# Patient Record
Sex: FEMALE | Race: WHITE | NOT HISPANIC OR LATINO | Employment: STUDENT | URBAN - METROPOLITAN AREA
[De-identification: names, ages, dates, MRNs, and addresses within clinical notes are randomized per-mention and may not be internally consistent; named-entity substitution may affect disease eponyms.]

---

## 2020-03-24 ENCOUNTER — TELEPHONE (OUTPATIENT)
Dept: ENDOCRINOLOGY | Facility: CLINIC | Age: 25
End: 2020-03-24

## 2020-03-24 NOTE — TELEPHONE ENCOUNTER
Left message per Dr Spicer,    Left call back number for patient to call if she would like to establish care with one of our Endocrine providers

## 2020-11-02 ENCOUNTER — TELEPHONE (OUTPATIENT)
Dept: PEDIATRICS | Facility: CLINIC | Age: 25
End: 2020-11-02

## 2020-11-02 ENCOUNTER — PATIENT MESSAGE (OUTPATIENT)
Dept: PEDIATRICS | Facility: CLINIC | Age: 25
End: 2020-11-02

## 2020-11-02 NOTE — TELEPHONE ENCOUNTER
----- Message from Ele Brush sent at 11/2/2020  2:10 PM CST -----  Contact: Susu [phone]  Patient needs call back. She seen Dr Guerrero up to age 16 and is looking for a shot record

## 2021-01-20 ENCOUNTER — TELEPHONE (OUTPATIENT)
Dept: OBSTETRICS AND GYNECOLOGY | Facility: CLINIC | Age: 26
End: 2021-01-20

## 2021-01-25 ENCOUNTER — TELEPHONE (OUTPATIENT)
Dept: OBSTETRICS AND GYNECOLOGY | Facility: CLINIC | Age: 26
End: 2021-01-25

## 2021-02-11 DIAGNOSIS — Z71.9 HEALTH EDUCATION/COUNSELING: Primary | ICD-10-CM

## 2021-03-10 ENCOUNTER — TELEPHONE (OUTPATIENT)
Dept: OBSTETRICS AND GYNECOLOGY | Facility: CLINIC | Age: 26
End: 2021-03-10

## 2021-03-16 ENCOUNTER — TELEPHONE (OUTPATIENT)
Dept: OBSTETRICS AND GYNECOLOGY | Facility: CLINIC | Age: 26
End: 2021-03-16

## 2021-04-22 ENCOUNTER — PATIENT MESSAGE (OUTPATIENT)
Dept: UROLOGY | Facility: CLINIC | Age: 26
End: 2021-04-22

## 2021-05-05 ENCOUNTER — OFFICE VISIT (OUTPATIENT)
Dept: OBSTETRICS AND GYNECOLOGY | Facility: CLINIC | Age: 26
End: 2021-05-05
Attending: OBSTETRICS & GYNECOLOGY
Payer: COMMERCIAL

## 2021-05-05 VITALS
SYSTOLIC BLOOD PRESSURE: 118 MMHG | HEIGHT: 70 IN | DIASTOLIC BLOOD PRESSURE: 60 MMHG | BODY MASS INDEX: 23.01 KG/M2 | WEIGHT: 160.69 LBS

## 2021-05-05 DIAGNOSIS — Z01.419 ENCOUNTER FOR GYNECOLOGICAL EXAMINATION WITHOUT ABNORMAL FINDING: ICD-10-CM

## 2021-05-05 DIAGNOSIS — Z12.4 SCREENING FOR MALIGNANT NEOPLASM OF THE CERVIX: Primary | ICD-10-CM

## 2021-05-05 PROCEDURE — 1126F AMNT PAIN NOTED NONE PRSNT: CPT | Mod: S$GLB,,, | Performed by: OBSTETRICS & GYNECOLOGY

## 2021-05-05 PROCEDURE — 99385 PREV VISIT NEW AGE 18-39: CPT | Mod: S$GLB,,, | Performed by: OBSTETRICS & GYNECOLOGY

## 2021-05-05 PROCEDURE — 3008F BODY MASS INDEX DOCD: CPT | Mod: CPTII,S$GLB,, | Performed by: OBSTETRICS & GYNECOLOGY

## 2021-05-05 PROCEDURE — 99385 PR PREVENTIVE VISIT,NEW,18-39: ICD-10-PCS | Mod: S$GLB,,, | Performed by: OBSTETRICS & GYNECOLOGY

## 2021-05-05 PROCEDURE — 1126F PR PAIN SEVERITY QUANTIFIED, NO PAIN PRESENT: ICD-10-PCS | Mod: S$GLB,,, | Performed by: OBSTETRICS & GYNECOLOGY

## 2021-05-05 PROCEDURE — 3008F PR BODY MASS INDEX (BMI) DOCUMENTED: ICD-10-PCS | Mod: CPTII,S$GLB,, | Performed by: OBSTETRICS & GYNECOLOGY

## 2021-05-05 PROCEDURE — 88175 CYTOPATH C/V AUTO FLUID REDO: CPT | Performed by: OBSTETRICS & GYNECOLOGY

## 2021-05-05 RX ORDER — SPIRONOLACTONE 100 MG/1
100 TABLET, FILM COATED ORAL DAILY
COMMUNITY
Start: 2021-01-31 | End: 2021-11-29

## 2021-05-14 ENCOUNTER — TELEPHONE (OUTPATIENT)
Dept: OBSTETRICS AND GYNECOLOGY | Facility: CLINIC | Age: 26
End: 2021-05-14

## 2021-05-14 LAB
FINAL PATHOLOGIC DIAGNOSIS: NORMAL
Lab: NORMAL

## 2021-07-05 ENCOUNTER — TELEPHONE (OUTPATIENT)
Dept: INTERNAL MEDICINE | Facility: CLINIC | Age: 26
End: 2021-07-05

## 2021-07-06 ENCOUNTER — OFFICE VISIT (OUTPATIENT)
Dept: INTERNAL MEDICINE | Facility: CLINIC | Age: 26
End: 2021-07-06
Payer: COMMERCIAL

## 2021-07-06 ENCOUNTER — PATIENT MESSAGE (OUTPATIENT)
Dept: INTERNAL MEDICINE | Facility: CLINIC | Age: 26
End: 2021-07-06

## 2021-07-06 ENCOUNTER — HOSPITAL ENCOUNTER (OUTPATIENT)
Dept: RADIOLOGY | Facility: HOSPITAL | Age: 26
Discharge: HOME | End: 2021-07-06
Attending: INTERNAL MEDICINE
Payer: COMMERCIAL

## 2021-07-06 VITALS
BODY MASS INDEX: 23.27 KG/M2 | HEIGHT: 70 IN | HEART RATE: 67 BPM | SYSTOLIC BLOOD PRESSURE: 114 MMHG | TEMPERATURE: 99 F | DIASTOLIC BLOOD PRESSURE: 73 MMHG | WEIGHT: 162.56 LBS

## 2021-07-06 DIAGNOSIS — J02.9 PHARYNGITIS, UNSPECIFIED ETIOLOGY: ICD-10-CM

## 2021-07-06 DIAGNOSIS — R61 NIGHT SWEATS: ICD-10-CM

## 2021-07-06 DIAGNOSIS — R59.0 LEFT CERVICAL LYMPHADENOPATHY: ICD-10-CM

## 2021-07-06 DIAGNOSIS — R59.0 LEFT CERVICAL LYMPHADENOPATHY: Primary | ICD-10-CM

## 2021-07-06 DIAGNOSIS — E10.9 TYPE 1 DIABETES MELLITUS WITHOUT COMPLICATION: ICD-10-CM

## 2021-07-06 LAB
ALBUMIN SERPL BCP-MCNC: 4 G/DL (ref 3.5–5.2)
ALP SERPL-CCNC: 77 U/L (ref 55–135)
ALT SERPL W/O P-5'-P-CCNC: 69 U/L (ref 10–44)
ANION GAP SERPL CALC-SCNC: 9 MMOL/L (ref 8–16)
AST SERPL-CCNC: 76 U/L (ref 10–40)
BASOPHILS # BLD AUTO: 0.1 K/UL (ref 0–0.2)
BASOPHILS NFR BLD: 2.7 % (ref 0–1.9)
BILIRUB SERPL-MCNC: 0.5 MG/DL (ref 0.1–1)
BUN SERPL-MCNC: 6 MG/DL (ref 6–20)
CALCIUM SERPL-MCNC: 10.1 MG/DL (ref 8.7–10.5)
CHLORIDE SERPL-SCNC: 103 MMOL/L (ref 95–110)
CO2 SERPL-SCNC: 28 MMOL/L (ref 23–29)
CREAT SERPL-MCNC: 0.9 MG/DL (ref 0.5–1.4)
DIFFERENTIAL METHOD: ABNORMAL
EOSINOPHIL # BLD AUTO: 0 K/UL (ref 0–0.5)
EOSINOPHIL NFR BLD: 0.5 % (ref 0–8)
ERYTHROCYTE [DISTWIDTH] IN BLOOD BY AUTOMATED COUNT: 12.9 % (ref 11.5–14.5)
EST. GFR  (AFRICAN AMERICAN): >60 ML/MIN/1.73 M^2
EST. GFR  (NON AFRICAN AMERICAN): >60 ML/MIN/1.73 M^2
GLUCOSE SERPL-MCNC: 182 MG/DL (ref 70–110)
GROUP A STREP, MOLECULAR: NEGATIVE
HCT VFR BLD AUTO: 42 % (ref 37–48.5)
HETEROPH AB SERPL QL IA: POSITIVE
HGB BLD-MCNC: 14.5 G/DL (ref 12–16)
IMM GRANULOCYTES # BLD AUTO: 0.01 K/UL (ref 0–0.04)
IMM GRANULOCYTES NFR BLD AUTO: 0.3 % (ref 0–0.5)
LYMPHOCYTES # BLD AUTO: 2.5 K/UL (ref 1–4.8)
LYMPHOCYTES NFR BLD: 67.5 % (ref 18–48)
MCH RBC QN AUTO: 33 PG (ref 27–31)
MCHC RBC AUTO-ENTMCNC: 34.5 G/DL (ref 32–36)
MCV RBC AUTO: 96 FL (ref 82–98)
MONOCYTES # BLD AUTO: 0.5 K/UL (ref 0.3–1)
MONOCYTES NFR BLD: 12.3 % (ref 4–15)
NEUTROPHILS # BLD AUTO: 0.6 K/UL (ref 1.8–7.7)
NEUTROPHILS NFR BLD: 16.7 % (ref 38–73)
NRBC BLD-RTO: 0 /100 WBC
PLATELET # BLD AUTO: 135 K/UL (ref 150–450)
PMV BLD AUTO: 10 FL (ref 9.2–12.9)
POTASSIUM SERPL-SCNC: 5.1 MMOL/L (ref 3.5–5.1)
PROT SERPL-MCNC: 7.1 G/DL (ref 6–8.4)
RBC # BLD AUTO: 4.4 M/UL (ref 4–5.4)
SODIUM SERPL-SCNC: 140 MMOL/L (ref 136–145)
WBC # BLD AUTO: 3.75 K/UL (ref 3.9–12.7)

## 2021-07-06 PROCEDURE — 80053 COMPREHEN METABOLIC PANEL: CPT | Performed by: INTERNAL MEDICINE

## 2021-07-06 PROCEDURE — 36415 COLL VENOUS BLD VENIPUNCTURE: CPT | Performed by: INTERNAL MEDICINE

## 2021-07-06 PROCEDURE — 87651 STREP A DNA AMP PROBE: CPT | Performed by: INTERNAL MEDICINE

## 2021-07-06 PROCEDURE — 86308 HETEROPHILE ANTIBODY SCREEN: CPT | Performed by: INTERNAL MEDICINE

## 2021-07-06 PROCEDURE — 99999 PR PBB SHADOW E&M-EST. PATIENT-LVL III: ICD-10-PCS | Mod: PBBFAC,,, | Performed by: INTERNAL MEDICINE

## 2021-07-06 PROCEDURE — 85025 COMPLETE CBC W/AUTO DIFF WBC: CPT | Performed by: INTERNAL MEDICINE

## 2021-07-06 PROCEDURE — 71046 X-RAY EXAM CHEST 2 VIEWS: CPT | Mod: TC,FY

## 2021-07-06 PROCEDURE — 71046 XR CHEST PA AND LATERAL: ICD-10-PCS | Mod: 26,,, | Performed by: RADIOLOGY

## 2021-07-06 PROCEDURE — 99999 PR PBB SHADOW E&M-EST. PATIENT-LVL III: CPT | Mod: PBBFAC,,, | Performed by: INTERNAL MEDICINE

## 2021-07-06 PROCEDURE — 3008F BODY MASS INDEX DOCD: CPT | Mod: CPTII,S$GLB,, | Performed by: INTERNAL MEDICINE

## 2021-07-06 PROCEDURE — 3008F PR BODY MASS INDEX (BMI) DOCUMENTED: ICD-10-PCS | Mod: CPTII,S$GLB,, | Performed by: INTERNAL MEDICINE

## 2021-07-06 PROCEDURE — 1126F AMNT PAIN NOTED NONE PRSNT: CPT | Mod: S$GLB,,, | Performed by: INTERNAL MEDICINE

## 2021-07-06 PROCEDURE — 71046 X-RAY EXAM CHEST 2 VIEWS: CPT | Mod: 26,,, | Performed by: RADIOLOGY

## 2021-07-06 PROCEDURE — 99204 PR OFFICE/OUTPT VISIT, NEW, LEVL IV, 45-59 MIN: ICD-10-PCS | Mod: S$GLB,,, | Performed by: INTERNAL MEDICINE

## 2021-07-06 PROCEDURE — 1126F PR PAIN SEVERITY QUANTIFIED, NO PAIN PRESENT: ICD-10-PCS | Mod: S$GLB,,, | Performed by: INTERNAL MEDICINE

## 2021-07-06 PROCEDURE — 99204 OFFICE O/P NEW MOD 45 MIN: CPT | Mod: S$GLB,,, | Performed by: INTERNAL MEDICINE

## 2021-07-06 RX ORDER — AMOXICILLIN AND CLAVULANATE POTASSIUM 500; 125 MG/1; MG/1
1 TABLET, FILM COATED ORAL 2 TIMES DAILY
Qty: 14 TABLET | Refills: 0 | Status: SHIPPED | OUTPATIENT
Start: 2021-07-06 | End: 2021-07-19

## 2021-07-06 RX ORDER — FLUTICASONE PROPIONATE 50 MCG
1 SPRAY, SUSPENSION (ML) NASAL 2 TIMES DAILY
Qty: 16 G | Refills: 1 | Status: SHIPPED | OUTPATIENT
Start: 2021-07-06 | End: 2021-07-28

## 2021-07-06 RX ORDER — GUAIFENESIN AND DEXTROMETHORPHAN HYDROBROMIDE 600; 30 MG/1; MG/1
1 TABLET, EXTENDED RELEASE ORAL 2 TIMES DAILY
Qty: 20 TABLET | Refills: 0 | Status: SHIPPED | OUTPATIENT
Start: 2021-07-06 | End: 2021-07-16

## 2021-07-06 RX ORDER — ATORVASTATIN CALCIUM 10 MG/1
10 TABLET, FILM COATED ORAL DAILY
COMMUNITY
Start: 2021-06-19 | End: 2023-01-09 | Stop reason: SDUPTHER

## 2021-07-08 ENCOUNTER — TELEPHONE (OUTPATIENT)
Dept: INTERNAL MEDICINE | Facility: CLINIC | Age: 26
End: 2021-07-08

## 2021-07-12 ENCOUNTER — LAB VISIT (OUTPATIENT)
Dept: LAB | Facility: HOSPITAL | Age: 26
End: 2021-07-12
Attending: INTERNAL MEDICINE
Payer: COMMERCIAL

## 2021-07-12 ENCOUNTER — OFFICE VISIT (OUTPATIENT)
Dept: INTERNAL MEDICINE | Facility: CLINIC | Age: 26
End: 2021-07-12
Payer: COMMERCIAL

## 2021-07-12 VITALS
BODY MASS INDEX: 23.87 KG/M2 | WEIGHT: 166.75 LBS | HEART RATE: 62 BPM | DIASTOLIC BLOOD PRESSURE: 83 MMHG | TEMPERATURE: 98 F | HEIGHT: 70 IN | SYSTOLIC BLOOD PRESSURE: 132 MMHG

## 2021-07-12 DIAGNOSIS — R59.0 LEFT CERVICAL LYMPHADENOPATHY: Primary | ICD-10-CM

## 2021-07-12 DIAGNOSIS — R74.8 ELEVATED LIVER ENZYMES: ICD-10-CM

## 2021-07-12 DIAGNOSIS — J02.9 PHARYNGITIS, UNSPECIFIED ETIOLOGY: ICD-10-CM

## 2021-07-12 DIAGNOSIS — R59.0 LEFT CERVICAL LYMPHADENOPATHY: ICD-10-CM

## 2021-07-12 DIAGNOSIS — E10.9 TYPE 1 DIABETES MELLITUS WITHOUT COMPLICATION: ICD-10-CM

## 2021-07-12 DIAGNOSIS — B27.80 OTHER INFECTIOUS MONONUCLEOSIS WITHOUT COMPLICATION: ICD-10-CM

## 2021-07-12 LAB
ALBUMIN SERPL BCP-MCNC: 4.1 G/DL (ref 3.5–5.2)
ALP SERPL-CCNC: 119 U/L (ref 55–135)
ALT SERPL W/O P-5'-P-CCNC: 190 U/L (ref 10–44)
ANION GAP SERPL CALC-SCNC: 9 MMOL/L (ref 8–16)
AST SERPL-CCNC: 139 U/L (ref 10–40)
BASOPHILS # BLD AUTO: 0.12 K/UL (ref 0–0.2)
BASOPHILS NFR BLD: 1.6 % (ref 0–1.9)
BILIRUB SERPL-MCNC: 0.6 MG/DL (ref 0.1–1)
BUN SERPL-MCNC: 7 MG/DL (ref 6–20)
CALCIUM SERPL-MCNC: 9.3 MG/DL (ref 8.7–10.5)
CHLORIDE SERPL-SCNC: 98 MMOL/L (ref 95–110)
CO2 SERPL-SCNC: 24 MMOL/L (ref 23–29)
CREAT SERPL-MCNC: 0.8 MG/DL (ref 0.5–1.4)
DIFFERENTIAL METHOD: ABNORMAL
EOSINOPHIL # BLD AUTO: 0 K/UL (ref 0–0.5)
EOSINOPHIL NFR BLD: 0.4 % (ref 0–8)
ERYTHROCYTE [DISTWIDTH] IN BLOOD BY AUTOMATED COUNT: 13.1 % (ref 11.5–14.5)
EST. GFR  (AFRICAN AMERICAN): >60 ML/MIN/1.73 M^2
EST. GFR  (NON AFRICAN AMERICAN): >60 ML/MIN/1.73 M^2
GLUCOSE SERPL-MCNC: 127 MG/DL (ref 70–110)
HCT VFR BLD AUTO: 38.9 % (ref 37–48.5)
HGB BLD-MCNC: 13.4 G/DL (ref 12–16)
IMM GRANULOCYTES # BLD AUTO: 0.04 K/UL (ref 0–0.04)
IMM GRANULOCYTES NFR BLD AUTO: 0.5 % (ref 0–0.5)
LYMPHOCYTES # BLD AUTO: 4.9 K/UL (ref 1–4.8)
LYMPHOCYTES NFR BLD: 65.3 % (ref 18–48)
MCH RBC QN AUTO: 32.5 PG (ref 27–31)
MCHC RBC AUTO-ENTMCNC: 34.4 G/DL (ref 32–36)
MCV RBC AUTO: 94 FL (ref 82–98)
MONOCYTES # BLD AUTO: 0.5 K/UL (ref 0.3–1)
MONOCYTES NFR BLD: 7.2 % (ref 4–15)
NEUTROPHILS # BLD AUTO: 1.9 K/UL (ref 1.8–7.7)
NEUTROPHILS NFR BLD: 25 % (ref 38–73)
NRBC BLD-RTO: 0 /100 WBC
PLATELET # BLD AUTO: 188 K/UL (ref 150–450)
PLATELET BLD QL SMEAR: ABNORMAL
PMV BLD AUTO: 9.6 FL (ref 9.2–12.9)
POTASSIUM SERPL-SCNC: 4.6 MMOL/L (ref 3.5–5.1)
PROT SERPL-MCNC: 7.5 G/DL (ref 6–8.4)
RBC # BLD AUTO: 4.12 M/UL (ref 4–5.4)
SODIUM SERPL-SCNC: 131 MMOL/L (ref 136–145)
WBC # BLD AUTO: 7.53 K/UL (ref 3.9–12.7)

## 2021-07-12 PROCEDURE — 36415 COLL VENOUS BLD VENIPUNCTURE: CPT | Performed by: INTERNAL MEDICINE

## 2021-07-12 PROCEDURE — 1126F PR PAIN SEVERITY QUANTIFIED, NO PAIN PRESENT: ICD-10-PCS | Mod: S$GLB,,, | Performed by: INTERNAL MEDICINE

## 2021-07-12 PROCEDURE — 1126F AMNT PAIN NOTED NONE PRSNT: CPT | Mod: S$GLB,,, | Performed by: INTERNAL MEDICINE

## 2021-07-12 PROCEDURE — 86645 CMV ANTIBODY IGM: CPT | Performed by: INTERNAL MEDICINE

## 2021-07-12 PROCEDURE — 99214 OFFICE O/P EST MOD 30 MIN: CPT | Mod: S$GLB,,, | Performed by: INTERNAL MEDICINE

## 2021-07-12 PROCEDURE — 3008F PR BODY MASS INDEX (BMI) DOCUMENTED: ICD-10-PCS | Mod: CPTII,S$GLB,, | Performed by: INTERNAL MEDICINE

## 2021-07-12 PROCEDURE — 85025 COMPLETE CBC W/AUTO DIFF WBC: CPT | Performed by: INTERNAL MEDICINE

## 2021-07-12 PROCEDURE — 99214 PR OFFICE/OUTPT VISIT, EST, LEVL IV, 30-39 MIN: ICD-10-PCS | Mod: S$GLB,,, | Performed by: INTERNAL MEDICINE

## 2021-07-12 PROCEDURE — 86663 EPSTEIN-BARR ANTIBODY: CPT | Performed by: INTERNAL MEDICINE

## 2021-07-12 PROCEDURE — 80053 COMPREHEN METABOLIC PANEL: CPT | Performed by: INTERNAL MEDICINE

## 2021-07-12 PROCEDURE — 86644 CMV ANTIBODY: CPT | Performed by: INTERNAL MEDICINE

## 2021-07-12 PROCEDURE — 3008F BODY MASS INDEX DOCD: CPT | Mod: CPTII,S$GLB,, | Performed by: INTERNAL MEDICINE

## 2021-07-12 PROCEDURE — 99999 PR PBB SHADOW E&M-EST. PATIENT-LVL II: CPT | Mod: PBBFAC,,, | Performed by: INTERNAL MEDICINE

## 2021-07-12 PROCEDURE — 99999 PR PBB SHADOW E&M-EST. PATIENT-LVL II: ICD-10-PCS | Mod: PBBFAC,,, | Performed by: INTERNAL MEDICINE

## 2021-07-13 LAB — CMV IGG SERPL QL IA: NORMAL

## 2021-07-15 LAB
CMV IGM SERPL IA-ACNC: <8 AU/ML
EBV EA IGG SER-ACNC: 38 U/ML
EBV NA IGG SER-ACNC: <3 U/ML
EBV VCA IGG SER-ACNC: 32.1 U/ML
EBV VCA IGM SER-ACNC: 107 U/ML

## 2021-07-19 ENCOUNTER — TELEPHONE (OUTPATIENT)
Dept: INTERNAL MEDICINE | Facility: CLINIC | Age: 26
End: 2021-07-19

## 2021-07-19 ENCOUNTER — CLINICAL SUPPORT (OUTPATIENT)
Dept: INTERNAL MEDICINE | Facility: CLINIC | Age: 26
End: 2021-07-19
Payer: COMMERCIAL

## 2021-07-19 ENCOUNTER — OFFICE VISIT (OUTPATIENT)
Dept: INTERNAL MEDICINE | Facility: CLINIC | Age: 26
End: 2021-07-19
Payer: COMMERCIAL

## 2021-07-19 VITALS
SYSTOLIC BLOOD PRESSURE: 120 MMHG | HEART RATE: 87 BPM | WEIGHT: 160.06 LBS | BODY MASS INDEX: 22.97 KG/M2 | DIASTOLIC BLOOD PRESSURE: 79 MMHG | TEMPERATURE: 98 F

## 2021-07-19 DIAGNOSIS — R74.8 ELEVATED LIVER ENZYMES: Primary | ICD-10-CM

## 2021-07-19 DIAGNOSIS — R59.0 CERVICAL LYMPHADENOPATHY: ICD-10-CM

## 2021-07-19 DIAGNOSIS — R59.0 LEFT CERVICAL LYMPHADENOPATHY: ICD-10-CM

## 2021-07-19 DIAGNOSIS — E10.9 TYPE 1 DIABETES MELLITUS WITHOUT COMPLICATION: ICD-10-CM

## 2021-07-19 DIAGNOSIS — B27.00 GAMMAHERPESVIRAL MONONUCLEOSIS WITHOUT COMPLICATION: Primary | ICD-10-CM

## 2021-07-19 DIAGNOSIS — R74.8 ELEVATED LIVER ENZYMES: ICD-10-CM

## 2021-07-19 LAB
ALBUMIN SERPL BCP-MCNC: 4.1 G/DL (ref 3.5–5.2)
ALP SERPL-CCNC: 91 U/L (ref 55–135)
ALT SERPL W/O P-5'-P-CCNC: 115 U/L (ref 10–44)
ANION GAP SERPL CALC-SCNC: 13 MMOL/L (ref 8–16)
AST SERPL-CCNC: 81 U/L (ref 10–40)
BILIRUB SERPL-MCNC: 0.7 MG/DL (ref 0.1–1)
BUN SERPL-MCNC: 9 MG/DL (ref 6–20)
CALCIUM SERPL-MCNC: 9.9 MG/DL (ref 8.7–10.5)
CHLORIDE SERPL-SCNC: 104 MMOL/L (ref 95–110)
CO2 SERPL-SCNC: 22 MMOL/L (ref 23–29)
CREAT SERPL-MCNC: 1 MG/DL (ref 0.5–1.4)
ERYTHROCYTE [DISTWIDTH] IN BLOOD BY AUTOMATED COUNT: 13.1 % (ref 11.5–14.5)
EST. GFR  (AFRICAN AMERICAN): >60 ML/MIN/1.73 M^2
EST. GFR  (NON AFRICAN AMERICAN): >60 ML/MIN/1.73 M^2
GLUCOSE SERPL-MCNC: 222 MG/DL (ref 70–110)
HCT VFR BLD AUTO: 41.7 % (ref 37–48.5)
HGB BLD-MCNC: 14.2 G/DL (ref 12–16)
MCH RBC QN AUTO: 32.6 PG (ref 27–31)
MCHC RBC AUTO-ENTMCNC: 34.1 G/DL (ref 32–36)
MCV RBC AUTO: 96 FL (ref 82–98)
PLATELET # BLD AUTO: 212 K/UL (ref 150–450)
PMV BLD AUTO: 9.2 FL (ref 9.2–12.9)
POTASSIUM SERPL-SCNC: 4.6 MMOL/L (ref 3.5–5.1)
PROT SERPL-MCNC: 7.5 G/DL (ref 6–8.4)
RBC # BLD AUTO: 4.36 M/UL (ref 4–5.4)
SODIUM SERPL-SCNC: 139 MMOL/L (ref 136–145)
WBC # BLD AUTO: 6.52 K/UL (ref 3.9–12.7)

## 2021-07-19 PROCEDURE — 80053 COMPREHEN METABOLIC PANEL: CPT | Performed by: INTERNAL MEDICINE

## 2021-07-19 PROCEDURE — 99999 PR PBB SHADOW E&M-EST. PATIENT-LVL III: CPT | Mod: PBBFAC,,, | Performed by: INTERNAL MEDICINE

## 2021-07-19 PROCEDURE — 1126F PR PAIN SEVERITY QUANTIFIED, NO PAIN PRESENT: ICD-10-PCS | Mod: CPTII,S$GLB,, | Performed by: INTERNAL MEDICINE

## 2021-07-19 PROCEDURE — 36415 COLL VENOUS BLD VENIPUNCTURE: CPT | Performed by: INTERNAL MEDICINE

## 2021-07-19 PROCEDURE — 3008F PR BODY MASS INDEX (BMI) DOCUMENTED: ICD-10-PCS | Mod: CPTII,S$GLB,, | Performed by: INTERNAL MEDICINE

## 2021-07-19 PROCEDURE — 85027 COMPLETE CBC AUTOMATED: CPT | Performed by: INTERNAL MEDICINE

## 2021-07-19 PROCEDURE — 3008F BODY MASS INDEX DOCD: CPT | Mod: CPTII,S$GLB,, | Performed by: INTERNAL MEDICINE

## 2021-07-19 PROCEDURE — 99213 PR OFFICE/OUTPT VISIT, EST, LEVL III, 20-29 MIN: ICD-10-PCS | Mod: S$GLB,,, | Performed by: INTERNAL MEDICINE

## 2021-07-19 PROCEDURE — 1126F AMNT PAIN NOTED NONE PRSNT: CPT | Mod: CPTII,S$GLB,, | Performed by: INTERNAL MEDICINE

## 2021-07-19 PROCEDURE — 99999 PR PBB SHADOW E&M-EST. PATIENT-LVL III: ICD-10-PCS | Mod: PBBFAC,,, | Performed by: INTERNAL MEDICINE

## 2021-07-19 PROCEDURE — 99213 OFFICE O/P EST LOW 20 MIN: CPT | Mod: S$GLB,,, | Performed by: INTERNAL MEDICINE

## 2021-07-25 ENCOUNTER — TELEPHONE (OUTPATIENT)
Dept: INTERNAL MEDICINE | Facility: CLINIC | Age: 26
End: 2021-07-25

## 2021-07-25 DIAGNOSIS — R74.8 ELEVATED LIVER ENZYMES: Primary | ICD-10-CM

## 2021-07-26 ENCOUNTER — CLINICAL SUPPORT (OUTPATIENT)
Dept: INTERNAL MEDICINE | Facility: CLINIC | Age: 26
End: 2021-07-26
Payer: COMMERCIAL

## 2021-07-26 DIAGNOSIS — Z01.84 ENCOUNTER FOR ANTIBODY RESPONSE EXAMINATION: ICD-10-CM

## 2021-07-26 DIAGNOSIS — R74.8 ELEVATED LIVER ENZYMES: ICD-10-CM

## 2021-07-26 LAB
ALBUMIN SERPL BCP-MCNC: 4.2 G/DL (ref 3.5–5.2)
ALP SERPL-CCNC: 88 U/L (ref 55–135)
ALT SERPL W/O P-5'-P-CCNC: 111 U/L (ref 10–44)
ANION GAP SERPL CALC-SCNC: 10 MMOL/L (ref 8–16)
AST SERPL-CCNC: 86 U/L (ref 10–40)
BASOPHILS # BLD AUTO: 0.1 K/UL (ref 0–0.2)
BASOPHILS NFR BLD: 2.3 % (ref 0–1.9)
BILIRUB SERPL-MCNC: 0.8 MG/DL (ref 0.1–1)
BUN SERPL-MCNC: 13 MG/DL (ref 6–20)
CALCIUM SERPL-MCNC: 10.3 MG/DL (ref 8.7–10.5)
CHLORIDE SERPL-SCNC: 108 MMOL/L (ref 95–110)
CO2 SERPL-SCNC: 24 MMOL/L (ref 23–29)
CREAT SERPL-MCNC: 0.9 MG/DL (ref 0.5–1.4)
DIFFERENTIAL METHOD: ABNORMAL
EOSINOPHIL # BLD AUTO: 0.1 K/UL (ref 0–0.5)
EOSINOPHIL NFR BLD: 1.8 % (ref 0–8)
ERYTHROCYTE [DISTWIDTH] IN BLOOD BY AUTOMATED COUNT: 12.9 % (ref 11.5–14.5)
EST. GFR  (AFRICAN AMERICAN): >60 ML/MIN/1.73 M^2
EST. GFR  (NON AFRICAN AMERICAN): >60 ML/MIN/1.73 M^2
GLUCOSE SERPL-MCNC: 98 MG/DL (ref 70–110)
HCT VFR BLD AUTO: 41.7 % (ref 37–48.5)
HGB BLD-MCNC: 14.3 G/DL (ref 12–16)
IMM GRANULOCYTES # BLD AUTO: 0.01 K/UL (ref 0–0.04)
IMM GRANULOCYTES NFR BLD AUTO: 0.2 % (ref 0–0.5)
LYMPHOCYTES # BLD AUTO: 2.4 K/UL (ref 1–4.8)
LYMPHOCYTES NFR BLD: 54.5 % (ref 18–48)
MCH RBC QN AUTO: 32.5 PG (ref 27–31)
MCHC RBC AUTO-ENTMCNC: 34.3 G/DL (ref 32–36)
MCV RBC AUTO: 95 FL (ref 82–98)
MONOCYTES # BLD AUTO: 0.5 K/UL (ref 0.3–1)
MONOCYTES NFR BLD: 11.1 % (ref 4–15)
NEUTROPHILS # BLD AUTO: 1.3 K/UL (ref 1.8–7.7)
NEUTROPHILS NFR BLD: 30.1 % (ref 38–73)
NRBC BLD-RTO: 0 /100 WBC
PLATELET # BLD AUTO: 249 K/UL (ref 150–450)
PMV BLD AUTO: 9.3 FL (ref 9.2–12.9)
POTASSIUM SERPL-SCNC: 4.7 MMOL/L (ref 3.5–5.1)
PROT SERPL-MCNC: 7.8 G/DL (ref 6–8.4)
RBC # BLD AUTO: 4.4 M/UL (ref 4–5.4)
SARS-COV-2 IGG SERPL IA-ACNC: 4480.6 AU/ML
SARS-COV-2 IGG SERPL QL IA: POSITIVE
SODIUM SERPL-SCNC: 142 MMOL/L (ref 136–145)
WBC # BLD AUTO: 4.33 K/UL (ref 3.9–12.7)

## 2021-07-26 PROCEDURE — 80053 COMPREHEN METABOLIC PANEL: CPT | Performed by: INTERNAL MEDICINE

## 2021-07-26 PROCEDURE — 86769 SARS-COV-2 COVID-19 ANTIBODY: CPT | Performed by: INTERNAL MEDICINE

## 2021-07-26 PROCEDURE — 85025 COMPLETE CBC W/AUTO DIFF WBC: CPT | Performed by: INTERNAL MEDICINE

## 2021-07-28 ENCOUNTER — PATIENT MESSAGE (OUTPATIENT)
Dept: OBSTETRICS AND GYNECOLOGY | Facility: CLINIC | Age: 26
End: 2021-07-28

## 2021-08-03 ENCOUNTER — PATIENT MESSAGE (OUTPATIENT)
Dept: ADMINISTRATIVE | Facility: HOSPITAL | Age: 26
End: 2021-08-03

## 2021-08-04 ENCOUNTER — CLINICAL SUPPORT (OUTPATIENT)
Dept: INTERNAL MEDICINE | Facility: CLINIC | Age: 26
End: 2021-08-04
Payer: COMMERCIAL

## 2021-08-04 ENCOUNTER — CLINICAL SUPPORT (OUTPATIENT)
Dept: URGENT CARE | Facility: CLINIC | Age: 26
End: 2021-08-04
Payer: COMMERCIAL

## 2021-08-04 ENCOUNTER — LAB VISIT (OUTPATIENT)
Dept: INTERNAL MEDICINE | Facility: CLINIC | Age: 26
End: 2021-08-04
Payer: COMMERCIAL

## 2021-08-04 DIAGNOSIS — R74.8 ELEVATED LIVER ENZYMES: ICD-10-CM

## 2021-08-04 DIAGNOSIS — Z20.822 ENCOUNTER FOR LABORATORY TESTING FOR COVID-19 VIRUS: ICD-10-CM

## 2021-08-04 DIAGNOSIS — Z11.9 SCREENING EXAMINATION FOR UNSPECIFIED INFECTIOUS DISEASE: Primary | ICD-10-CM

## 2021-08-04 DIAGNOSIS — A92.30 WEST NILE VIRUS INFECTION, UNSPECIFIED: Primary | ICD-10-CM

## 2021-08-04 LAB
ALBUMIN SERPL BCP-MCNC: 4.1 G/DL (ref 3.5–5.2)
ALP SERPL-CCNC: 65 U/L (ref 55–135)
ALT SERPL W/O P-5'-P-CCNC: 52 U/L (ref 10–44)
ANION GAP SERPL CALC-SCNC: 5 MMOL/L (ref 8–16)
AST SERPL-CCNC: 38 U/L (ref 10–40)
BASOPHILS # BLD AUTO: 0.08 K/UL (ref 0–0.2)
BASOPHILS NFR BLD: 1.9 % (ref 0–1.9)
BILIRUB SERPL-MCNC: 0.9 MG/DL (ref 0.1–1)
BUN SERPL-MCNC: 7 MG/DL (ref 6–20)
CALCIUM SERPL-MCNC: 9.9 MG/DL (ref 8.7–10.5)
CHLORIDE SERPL-SCNC: 106 MMOL/L (ref 95–110)
CO2 SERPL-SCNC: 28 MMOL/L (ref 23–29)
CREAT SERPL-MCNC: 0.9 MG/DL (ref 0.5–1.4)
CTP QC/QA: YES
DIFFERENTIAL METHOD: ABNORMAL
EOSINOPHIL # BLD AUTO: 0.2 K/UL (ref 0–0.5)
EOSINOPHIL NFR BLD: 3.5 % (ref 0–8)
ERYTHROCYTE [DISTWIDTH] IN BLOOD BY AUTOMATED COUNT: 13.1 % (ref 11.5–14.5)
EST. GFR  (AFRICAN AMERICAN): >60 ML/MIN/1.73 M^2
EST. GFR  (NON AFRICAN AMERICAN): >60 ML/MIN/1.73 M^2
GLUCOSE SERPL-MCNC: 132 MG/DL (ref 70–110)
HCT VFR BLD AUTO: 40.1 % (ref 37–48.5)
HGB BLD-MCNC: 13.7 G/DL (ref 12–16)
IMM GRANULOCYTES # BLD AUTO: 0.01 K/UL (ref 0–0.04)
IMM GRANULOCYTES NFR BLD AUTO: 0.2 % (ref 0–0.5)
LYMPHOCYTES # BLD AUTO: 1.8 K/UL (ref 1–4.8)
LYMPHOCYTES NFR BLD: 42.2 % (ref 18–48)
MCH RBC QN AUTO: 32.7 PG (ref 27–31)
MCHC RBC AUTO-ENTMCNC: 34.2 G/DL (ref 32–36)
MCV RBC AUTO: 96 FL (ref 82–98)
MONOCYTES # BLD AUTO: 0.4 K/UL (ref 0.3–1)
MONOCYTES NFR BLD: 10.3 % (ref 4–15)
NEUTROPHILS # BLD AUTO: 1.8 K/UL (ref 1.8–7.7)
NEUTROPHILS NFR BLD: 41.9 % (ref 38–73)
NRBC BLD-RTO: 0 /100 WBC
PLATELET # BLD AUTO: 228 K/UL (ref 150–450)
PMV BLD AUTO: 9.5 FL (ref 9.2–12.9)
POTASSIUM SERPL-SCNC: 4.7 MMOL/L (ref 3.5–5.1)
PROT SERPL-MCNC: 7.3 G/DL (ref 6–8.4)
RBC # BLD AUTO: 4.19 M/UL (ref 4–5.4)
SARS-COV-2 RDRP RESP QL NAA+PROBE: NEGATIVE
SODIUM SERPL-SCNC: 139 MMOL/L (ref 136–145)
WBC # BLD AUTO: 4.29 K/UL (ref 3.9–12.7)

## 2021-08-04 PROCEDURE — 85025 COMPLETE CBC W/AUTO DIFF WBC: CPT | Performed by: INTERNAL MEDICINE

## 2021-08-04 PROCEDURE — U0002: ICD-10-PCS | Mod: QW,S$GLB,, | Performed by: NURSE PRACTITIONER

## 2021-08-04 PROCEDURE — 99211 PR OFFICE/OUTPT VISIT, EST, LEVL I: ICD-10-PCS | Mod: S$GLB,CS,, | Performed by: NURSE PRACTITIONER

## 2021-08-04 PROCEDURE — 80053 COMPREHEN METABOLIC PANEL: CPT | Performed by: INTERNAL MEDICINE

## 2021-08-04 PROCEDURE — U0002 COVID-19 LAB TEST NON-CDC: HCPCS | Mod: QW,S$GLB,, | Performed by: NURSE PRACTITIONER

## 2021-08-04 PROCEDURE — 99211 OFF/OP EST MAY X REQ PHY/QHP: CPT | Mod: S$GLB,CS,, | Performed by: NURSE PRACTITIONER

## 2021-10-18 ENCOUNTER — PATIENT MESSAGE (OUTPATIENT)
Dept: ADMINISTRATIVE | Facility: HOSPITAL | Age: 26
End: 2021-10-18
Payer: COMMERCIAL

## 2021-11-28 ENCOUNTER — PATIENT MESSAGE (OUTPATIENT)
Dept: INTERNAL MEDICINE | Facility: CLINIC | Age: 26
End: 2021-11-28
Payer: COMMERCIAL

## 2021-11-29 ENCOUNTER — CLINICAL SUPPORT (OUTPATIENT)
Dept: INTERNAL MEDICINE | Facility: CLINIC | Age: 26
End: 2021-11-29
Payer: COMMERCIAL

## 2021-11-29 ENCOUNTER — OFFICE VISIT (OUTPATIENT)
Dept: INTERNAL MEDICINE | Facility: CLINIC | Age: 26
End: 2021-11-29
Payer: COMMERCIAL

## 2021-11-29 VITALS
SYSTOLIC BLOOD PRESSURE: 109 MMHG | DIASTOLIC BLOOD PRESSURE: 65 MMHG | HEART RATE: 53 BPM | BODY MASS INDEX: 23.15 KG/M2 | WEIGHT: 161.69 LBS | TEMPERATURE: 98 F | HEIGHT: 70 IN

## 2021-11-29 DIAGNOSIS — Z00.00 ROUTINE GENERAL MEDICAL EXAMINATION AT A HEALTH CARE FACILITY: Primary | ICD-10-CM

## 2021-11-29 DIAGNOSIS — E10.9 TYPE 1 DIABETES MELLITUS WITHOUT COMPLICATION: ICD-10-CM

## 2021-11-29 DIAGNOSIS — E78.5 DYSLIPIDEMIA: ICD-10-CM

## 2021-11-29 LAB
ALBUMIN SERPL BCP-MCNC: 4.1 G/DL (ref 3.5–5.2)
ALBUMIN/CREAT UR: NORMAL UG/MG (ref 0–30)
ALBUMIN/CREAT UR: NORMAL UG/MG (ref 0–30)
ALP SERPL-CCNC: 57 U/L (ref 55–135)
ALT SERPL W/O P-5'-P-CCNC: 16 U/L (ref 10–44)
ANION GAP SERPL CALC-SCNC: 11 MMOL/L (ref 8–16)
AST SERPL-CCNC: 22 U/L (ref 10–40)
BILIRUB SERPL-MCNC: 0.9 MG/DL (ref 0.1–1)
BUN SERPL-MCNC: 14 MG/DL (ref 6–20)
CALCIUM SERPL-MCNC: 9.4 MG/DL (ref 8.7–10.5)
CHLORIDE SERPL-SCNC: 105 MMOL/L (ref 95–110)
CHOLEST SERPL-MCNC: 171 MG/DL (ref 120–199)
CHOLEST/HDLC SERPL: 2.9 {RATIO} (ref 2–5)
CO2 SERPL-SCNC: 22 MMOL/L (ref 23–29)
CREAT SERPL-MCNC: 0.9 MG/DL (ref 0.5–1.4)
CREAT UR-MCNC: 85 MG/DL (ref 15–325)
CREAT UR-MCNC: 85 MG/DL (ref 15–325)
ERYTHROCYTE [DISTWIDTH] IN BLOOD BY AUTOMATED COUNT: 12.8 % (ref 11.5–14.5)
EST. GFR  (AFRICAN AMERICAN): >60 ML/MIN/1.73 M^2
EST. GFR  (NON AFRICAN AMERICAN): >60 ML/MIN/1.73 M^2
ESTIMATED AVG GLUCOSE: 151 MG/DL (ref 68–131)
GLUCOSE SERPL-MCNC: 119 MG/DL (ref 70–110)
HBA1C MFR BLD: 6.9 % (ref 4–5.6)
HCT VFR BLD AUTO: 42.3 % (ref 37–48.5)
HCV AB SERPL QL IA: NEGATIVE
HDLC SERPL-MCNC: 58 MG/DL (ref 40–75)
HDLC SERPL: 33.9 % (ref 20–50)
HGB BLD-MCNC: 13.9 G/DL (ref 12–16)
HIV 1+2 AB+HIV1 P24 AG SERPL QL IA: NEGATIVE
LDLC SERPL CALC-MCNC: 99.2 MG/DL (ref 63–159)
MCH RBC QN AUTO: 32.7 PG (ref 27–31)
MCHC RBC AUTO-ENTMCNC: 32.9 G/DL (ref 32–36)
MCV RBC AUTO: 100 FL (ref 82–98)
MICROALBUMIN UR DL<=1MG/L-MCNC: <5 UG/ML
MICROALBUMIN UR DL<=1MG/L-MCNC: <5 UG/ML
NONHDLC SERPL-MCNC: 113 MG/DL
PLATELET # BLD AUTO: 219 K/UL (ref 150–450)
PMV BLD AUTO: 9.2 FL (ref 9.2–12.9)
POTASSIUM SERPL-SCNC: 4.9 MMOL/L (ref 3.5–5.1)
PROT SERPL-MCNC: 6.8 G/DL (ref 6–8.4)
RBC # BLD AUTO: 4.25 M/UL (ref 4–5.4)
SODIUM SERPL-SCNC: 138 MMOL/L (ref 136–145)
TRIGL SERPL-MCNC: 69 MG/DL (ref 30–150)
TSH SERPL DL<=0.005 MIU/L-ACNC: 2.33 UIU/ML (ref 0.4–4)
WBC # BLD AUTO: 4.81 K/UL (ref 3.9–12.7)

## 2021-11-29 PROCEDURE — 84443 ASSAY THYROID STIM HORMONE: CPT | Performed by: INTERNAL MEDICINE

## 2021-11-29 PROCEDURE — 83036 HEMOGLOBIN GLYCOSYLATED A1C: CPT | Performed by: INTERNAL MEDICINE

## 2021-11-29 PROCEDURE — 90471 FLU VACCINE (QUAD) GREATER THAN OR EQUAL TO 3YO PRESERVATIVE FREE IM: ICD-10-PCS | Mod: S$GLB,,, | Performed by: INTERNAL MEDICINE

## 2021-11-29 PROCEDURE — 80053 COMPREHEN METABOLIC PANEL: CPT | Performed by: INTERNAL MEDICINE

## 2021-11-29 PROCEDURE — 90732 PNEUMOCOCCAL POLYSACCHARIDE VACCINE 23-VALENT =>2YO SQ IM: ICD-10-PCS | Mod: S$GLB,,, | Performed by: INTERNAL MEDICINE

## 2021-11-29 PROCEDURE — 90472 PNEUMOCOCCAL POLYSACCHARIDE VACCINE 23-VALENT =>2YO SQ IM: ICD-10-PCS | Mod: S$GLB,,, | Performed by: INTERNAL MEDICINE

## 2021-11-29 PROCEDURE — 90471 IMMUNIZATION ADMIN: CPT | Mod: S$GLB,,, | Performed by: INTERNAL MEDICINE

## 2021-11-29 PROCEDURE — 90686 IIV4 VACC NO PRSV 0.5 ML IM: CPT | Mod: S$GLB,,, | Performed by: INTERNAL MEDICINE

## 2021-11-29 PROCEDURE — 80061 LIPID PANEL: CPT | Performed by: INTERNAL MEDICINE

## 2021-11-29 PROCEDURE — 99999 PR PBB SHADOW E&M-EST. PATIENT-LVL III: ICD-10-PCS | Mod: PBBFAC,,, | Performed by: INTERNAL MEDICINE

## 2021-11-29 PROCEDURE — 85027 COMPLETE CBC AUTOMATED: CPT | Performed by: INTERNAL MEDICINE

## 2021-11-29 PROCEDURE — 87389 HIV-1 AG W/HIV-1&-2 AB AG IA: CPT | Performed by: INTERNAL MEDICINE

## 2021-11-29 PROCEDURE — 86803 HEPATITIS C AB TEST: CPT | Performed by: INTERNAL MEDICINE

## 2021-11-29 PROCEDURE — 90732 PPSV23 VACC 2 YRS+ SUBQ/IM: CPT | Mod: S$GLB,,, | Performed by: INTERNAL MEDICINE

## 2021-11-29 PROCEDURE — 90686 FLU VACCINE (QUAD) GREATER THAN OR EQUAL TO 3YO PRESERVATIVE FREE IM: ICD-10-PCS | Mod: S$GLB,,, | Performed by: INTERNAL MEDICINE

## 2021-11-29 PROCEDURE — 82570 ASSAY OF URINE CREATININE: CPT | Performed by: INTERNAL MEDICINE

## 2021-11-29 PROCEDURE — 99395 PR PREVENTIVE VISIT,EST,18-39: ICD-10-PCS | Mod: 25,S$GLB,, | Performed by: INTERNAL MEDICINE

## 2021-11-29 PROCEDURE — 99999 PR PBB SHADOW E&M-EST. PATIENT-LVL III: CPT | Mod: PBBFAC,,, | Performed by: INTERNAL MEDICINE

## 2021-11-29 PROCEDURE — 90472 IMMUNIZATION ADMIN EACH ADD: CPT | Mod: S$GLB,,, | Performed by: INTERNAL MEDICINE

## 2021-11-29 PROCEDURE — 99395 PREV VISIT EST AGE 18-39: CPT | Mod: 25,S$GLB,, | Performed by: INTERNAL MEDICINE

## 2021-11-29 RX ORDER — INSULIN LISPRO 100 [IU]/ML
INJECTION, SOLUTION INTRAVENOUS; SUBCUTANEOUS
COMMUNITY

## 2021-12-03 RX ORDER — INSULIN ASPART 100 [IU]/ML
INJECTION, SOLUTION INTRAVENOUS; SUBCUTANEOUS
Qty: 10 ML | Refills: 4 | Status: SHIPPED | OUTPATIENT
Start: 2021-12-03 | End: 2021-12-06

## 2021-12-14 ENCOUNTER — IMMUNIZATION (OUTPATIENT)
Dept: INTERNAL MEDICINE | Facility: CLINIC | Age: 26
End: 2021-12-14
Payer: COMMERCIAL

## 2021-12-14 DIAGNOSIS — Z23 NEED FOR VACCINATION: Primary | ICD-10-CM

## 2021-12-14 PROCEDURE — 0064A COVID-19, MRNA, LNP-S, PF, 100 MCG/0.25 ML DOSE VACCINE (MODERNA BOOSTER): CPT | Mod: PBBFAC | Performed by: INTERNAL MEDICINE

## 2022-01-17 ENCOUNTER — PATIENT MESSAGE (OUTPATIENT)
Dept: OBSTETRICS AND GYNECOLOGY | Facility: CLINIC | Age: 27
End: 2022-01-17
Payer: COMMERCIAL

## 2022-02-16 ENCOUNTER — TELEPHONE (OUTPATIENT)
Dept: OBSTETRICS AND GYNECOLOGY | Facility: CLINIC | Age: 27
End: 2022-02-16
Payer: COMMERCIAL

## 2022-04-12 ENCOUNTER — TELEPHONE (OUTPATIENT)
Dept: INTERNAL MEDICINE | Facility: CLINIC | Age: 27
End: 2022-04-12
Payer: COMMERCIAL

## 2022-04-12 DIAGNOSIS — B37.31 CANDIDA VAGINITIS: Primary | ICD-10-CM

## 2022-04-12 RX ORDER — AMOXICILLIN AND CLAVULANATE POTASSIUM 875; 125 MG/1; MG/1
1 TABLET, FILM COATED ORAL EVERY 12 HOURS
Qty: 14 TABLET | Refills: 0 | Status: SHIPPED | OUTPATIENT
Start: 2022-04-12 | End: 2022-04-19

## 2022-04-12 RX ORDER — FLUTICASONE PROPIONATE 50 MCG
1 SPRAY, SUSPENSION (ML) NASAL 2 TIMES DAILY
Qty: 16 G | Refills: 1 | Status: SHIPPED | OUTPATIENT
Start: 2022-04-12 | End: 2022-05-12

## 2022-04-12 RX ORDER — AMOXICILLIN AND CLAVULANATE POTASSIUM 875; 125 MG/1; MG/1
1 TABLET, FILM COATED ORAL EVERY 12 HOURS
Qty: 14 TABLET | Refills: 0 | Status: SHIPPED | OUTPATIENT
Start: 2022-04-12 | End: 2022-04-12 | Stop reason: SDUPTHER

## 2022-04-12 RX ORDER — FLUTICASONE PROPIONATE 50 MCG
1 SPRAY, SUSPENSION (ML) NASAL 2 TIMES DAILY
Qty: 16 G | Refills: 1 | Status: SHIPPED | OUTPATIENT
Start: 2022-04-12 | End: 2022-04-12 | Stop reason: SDUPTHER

## 2022-04-12 NOTE — TELEPHONE ENCOUNTER
3-4 history of congestion and taking allergy medicines     Dayquil, nyquil    Yesterday significant ear fullness, and discomfort

## 2022-04-19 RX ORDER — FLUCONAZOLE 150 MG/1
150 TABLET ORAL ONCE
Qty: 2 TABLET | Refills: 0 | Status: SHIPPED | OUTPATIENT
Start: 2022-04-19 | End: 2022-04-19

## 2022-05-16 ENCOUNTER — PATIENT MESSAGE (OUTPATIENT)
Dept: INTERNAL MEDICINE | Facility: CLINIC | Age: 27
End: 2022-05-16
Payer: COMMERCIAL

## 2022-08-27 ENCOUNTER — PATIENT MESSAGE (OUTPATIENT)
Dept: ADMINISTRATIVE | Facility: HOSPITAL | Age: 27
End: 2022-08-27
Payer: COMMERCIAL

## 2022-08-29 ENCOUNTER — OFFICE VISIT (OUTPATIENT)
Dept: OBSTETRICS AND GYNECOLOGY | Facility: CLINIC | Age: 27
End: 2022-08-29
Attending: OBSTETRICS & GYNECOLOGY
Payer: COMMERCIAL

## 2022-08-29 VITALS
DIASTOLIC BLOOD PRESSURE: 78 MMHG | HEIGHT: 70 IN | WEIGHT: 169 LBS | BODY MASS INDEX: 24.2 KG/M2 | SYSTOLIC BLOOD PRESSURE: 130 MMHG | HEART RATE: 73 BPM

## 2022-08-29 DIAGNOSIS — Z12.4 SCREENING FOR MALIGNANT NEOPLASM OF THE CERVIX: Primary | ICD-10-CM

## 2022-08-29 DIAGNOSIS — Z01.419 ENCOUNTER FOR GYNECOLOGICAL EXAMINATION WITHOUT ABNORMAL FINDING: ICD-10-CM

## 2022-08-29 PROCEDURE — 88141 CYTOPATH C/V INTERPRET: CPT | Mod: ,,, | Performed by: PATHOLOGY

## 2022-08-29 PROCEDURE — 99395 PREV VISIT EST AGE 18-39: CPT | Mod: S$GLB,,, | Performed by: OBSTETRICS & GYNECOLOGY

## 2022-08-29 PROCEDURE — 3008F PR BODY MASS INDEX (BMI) DOCUMENTED: ICD-10-PCS | Mod: CPTII,S$GLB,, | Performed by: OBSTETRICS & GYNECOLOGY

## 2022-08-29 PROCEDURE — 3078F DIAST BP <80 MM HG: CPT | Mod: CPTII,S$GLB,, | Performed by: OBSTETRICS & GYNECOLOGY

## 2022-08-29 PROCEDURE — 1159F PR MEDICATION LIST DOCUMENTED IN MEDICAL RECORD: ICD-10-PCS | Mod: CPTII,S$GLB,, | Performed by: OBSTETRICS & GYNECOLOGY

## 2022-08-29 PROCEDURE — 99999 PR PBB SHADOW E&M-EST. PATIENT-LVL III: CPT | Mod: PBBFAC,,, | Performed by: OBSTETRICS & GYNECOLOGY

## 2022-08-29 PROCEDURE — 3078F PR MOST RECENT DIASTOLIC BLOOD PRESSURE < 80 MM HG: ICD-10-PCS | Mod: CPTII,S$GLB,, | Performed by: OBSTETRICS & GYNECOLOGY

## 2022-08-29 PROCEDURE — 88141 PR  CYTOPATH CERV/VAG INTERPRET: ICD-10-PCS | Mod: ,,, | Performed by: PATHOLOGY

## 2022-08-29 PROCEDURE — 3075F PR MOST RECENT SYSTOLIC BLOOD PRESS GE 130-139MM HG: ICD-10-PCS | Mod: CPTII,S$GLB,, | Performed by: OBSTETRICS & GYNECOLOGY

## 2022-08-29 PROCEDURE — 99395 PR PREVENTIVE VISIT,EST,18-39: ICD-10-PCS | Mod: S$GLB,,, | Performed by: OBSTETRICS & GYNECOLOGY

## 2022-08-29 PROCEDURE — 3075F SYST BP GE 130 - 139MM HG: CPT | Mod: CPTII,S$GLB,, | Performed by: OBSTETRICS & GYNECOLOGY

## 2022-08-29 PROCEDURE — 3008F BODY MASS INDEX DOCD: CPT | Mod: CPTII,S$GLB,, | Performed by: OBSTETRICS & GYNECOLOGY

## 2022-08-29 PROCEDURE — 99999 PR PBB SHADOW E&M-EST. PATIENT-LVL III: ICD-10-PCS | Mod: PBBFAC,,, | Performed by: OBSTETRICS & GYNECOLOGY

## 2022-08-29 PROCEDURE — 88175 CYTOPATH C/V AUTO FLUID REDO: CPT | Performed by: PATHOLOGY

## 2022-08-29 PROCEDURE — 1159F MED LIST DOCD IN RCRD: CPT | Mod: CPTII,S$GLB,, | Performed by: OBSTETRICS & GYNECOLOGY

## 2022-08-29 NOTE — PROGRESS NOTES
"SUBJECTIVE:   27 y.o. female   for annual routine Pap and checkup. No LMP recorded. (Menstrual status: Birth Control)..  She has no unusual complaints.    She has Mirena IUD in place since 2017. She is happy with IUD    Past Medical History:   Diagnosis Date    Diabetes mellitus type I     Family history of high cholesterol     H/o Atorvastin 5mg     Type I (juvenile type) diabetes mellitus without mention of complication, uncontrolled 2012     Past Surgical History:   Procedure Laterality Date    WISDOM TOOTH EXTRACTION Bilateral 2014     Social History     Socioeconomic History    Marital status: Single   Tobacco Use    Smoking status: Never    Smokeless tobacco: Never   Substance and Sexual Activity    Alcohol use: Yes     Comment: occ    Drug use: No    Sexual activity: Yes     Partners: Male     Birth control/protection: I.U.D.     Comment: MITZI age 22 ( 2018)     Family History   Problem Relation Age of Onset    Diabetes Cousin     Colon cancer Maternal Grandmother     Colon cancer Maternal Grandfather     Breast cancer Maternal Aunt     Cancer Maternal Aunt         breast    Heart disease Paternal Grandfather 60    Ovarian cancer Neg Hx      OB History    Para Term  AB Living   0             SAB IAB Ectopic Multiple Live Births                       Current Outpatient Medications   Medication Sig Dispense Refill    atorvastatin (LIPITOR) 10 MG tablet Take 5 mg by mouth once daily.       BD INSULIN SYRINGE ULT-FINE II 0.3 mL 31 x 5/16" Syrg USE AS DIRECTED 5 TIMES A DAY FOR INSULIN ADMINISTRATION 500 each 1    folic acid-vit B6-vit B12 2.5-25-2 mg (FOLBIC OR EQUIV) 2.5-25-2 mg Tab Take 1 tablet by mouth once daily. 90 tablet 3    glucagon, human recombinant, (GLUCAGON) 1 mg Kit Inject 1 mL (1 mg total) into the skin as needed (for emergency hypoglycemic episode). 3 kit 1    insulin aspart U-100 (NOVOLOG U-100 INSULIN ASPART) 100 unit/mL injection ACCORDING TO YOUR SLIDING " "SCALE 10 mL 4    insulin glargine,hum.rec.anlog (LANTUS U-100 INSULIN SUBQ) Inject into the skin.      insulin lispro 100 unit/mL injection Inject into the skin 3 (three) times daily before meals.      insulin syringe-needle U-100 1/2 mL 30 x 5/16" Syrg For four to five times administration of insulin. 300 each 4    lancets (FREESTYLE LANCETS) Misc 6 Sticks by Misc.(Non-Drug; Combo Route) route once daily. 700 each 3    levonorgestreL (MIRENA) 20 mcg/24 hours (7 yrs) 52 mg IUD 1 each by Intrauterine route once.       No current facility-administered medications for this visit.     Allergies: Patient has no known allergies.     The ASCVD Risk score (Justin DK, et al., 2019) failed to calculate for the following reasons:    The 2019 ASCVD risk score is only valid for ages 40 to 79      ROS:  Constitutional: no weight loss, weight gain, fever, fatigue  Eyes:  No vision changes, glasses/contacts  ENT/Mouth: No ulcers, sinus problems, ears ringing, headache  Cardiovascular: No inability to lie flat, chest pain, exercise intolerance, swelling, heart palpitations  Respiratory: No wheezing, coughing blood, shortness of breath, or cough  Gastrointestinal: No diarrhea, bloody stool, nausea/vomiting, constipation, gas, hemorrhoids  Genitourinary: No blood in urine, painful urination, urgency of urination, frequency of urination, incomplete emptying, incontinence, abnormal bleeding, painful periods, heavy periods, vaginal discharge, vaginal odor, painful intercourse, sexual problems, bleeding after intercourse.  Musculoskeletal: No muscle weakness  Skin/Breast: No painful breasts, nipple discharge, masses, rash, ulcers  Neurological: No passing out, seizures, numbness, headache  Endocrine: + diabetes, hypothyroid, hyperthyroid, hot flashes, hair loss, abnormal hair growth, acne  Psychiatric: No depression, crying  Hematologic: No bruises, bleeding, swollen lymph nodes, anemia.      Physical Exam      ASSESSMENT:   well " woman    PLAN:   pap smear  Unable to visualize IUD strings- counseled her that she will need hysteroscopic IUD removal when she desires IUD removal  return annually or prn

## 2022-09-08 ENCOUNTER — PATIENT MESSAGE (OUTPATIENT)
Dept: OBSTETRICS AND GYNECOLOGY | Facility: CLINIC | Age: 27
End: 2022-09-08
Payer: COMMERCIAL

## 2022-09-08 LAB
FINAL PATHOLOGIC DIAGNOSIS: NORMAL
Lab: NORMAL

## 2022-10-21 ENCOUNTER — OFFICE VISIT (OUTPATIENT)
Dept: INTERNAL MEDICINE | Facility: CLINIC | Age: 27
End: 2022-10-21
Payer: COMMERCIAL

## 2022-10-21 VITALS
BODY MASS INDEX: 22.73 KG/M2 | HEIGHT: 70 IN | DIASTOLIC BLOOD PRESSURE: 76 MMHG | SYSTOLIC BLOOD PRESSURE: 122 MMHG | HEART RATE: 59 BPM | OXYGEN SATURATION: 100 % | WEIGHT: 158.75 LBS

## 2022-10-21 DIAGNOSIS — E10.9 TYPE 1 DIABETES MELLITUS WITHOUT COMPLICATION: ICD-10-CM

## 2022-10-21 DIAGNOSIS — Z11.3 SCREENING EXAMINATION FOR STD (SEXUALLY TRANSMITTED DISEASE): ICD-10-CM

## 2022-10-21 DIAGNOSIS — Z00.00 ANNUAL PHYSICAL EXAM: Primary | ICD-10-CM

## 2022-10-21 PROCEDURE — 99999 PR PBB SHADOW E&M-EST. PATIENT-LVL IV: CPT | Mod: PBBFAC,,, | Performed by: STUDENT IN AN ORGANIZED HEALTH CARE EDUCATION/TRAINING PROGRAM

## 2022-10-21 PROCEDURE — 3074F SYST BP LT 130 MM HG: CPT | Mod: CPTII,S$GLB,, | Performed by: STUDENT IN AN ORGANIZED HEALTH CARE EDUCATION/TRAINING PROGRAM

## 2022-10-21 PROCEDURE — 3074F PR MOST RECENT SYSTOLIC BLOOD PRESSURE < 130 MM HG: ICD-10-PCS | Mod: CPTII,S$GLB,, | Performed by: STUDENT IN AN ORGANIZED HEALTH CARE EDUCATION/TRAINING PROGRAM

## 2022-10-21 PROCEDURE — 99385 PREV VISIT NEW AGE 18-39: CPT | Mod: S$GLB,,, | Performed by: STUDENT IN AN ORGANIZED HEALTH CARE EDUCATION/TRAINING PROGRAM

## 2022-10-21 PROCEDURE — 1159F PR MEDICATION LIST DOCUMENTED IN MEDICAL RECORD: ICD-10-PCS | Mod: CPTII,S$GLB,, | Performed by: STUDENT IN AN ORGANIZED HEALTH CARE EDUCATION/TRAINING PROGRAM

## 2022-10-21 PROCEDURE — 99385 PR PREVENTIVE VISIT,NEW,18-39: ICD-10-PCS | Mod: S$GLB,,, | Performed by: STUDENT IN AN ORGANIZED HEALTH CARE EDUCATION/TRAINING PROGRAM

## 2022-10-21 PROCEDURE — 3078F PR MOST RECENT DIASTOLIC BLOOD PRESSURE < 80 MM HG: ICD-10-PCS | Mod: CPTII,S$GLB,, | Performed by: STUDENT IN AN ORGANIZED HEALTH CARE EDUCATION/TRAINING PROGRAM

## 2022-10-21 PROCEDURE — 99999 PR PBB SHADOW E&M-EST. PATIENT-LVL IV: ICD-10-PCS | Mod: PBBFAC,,, | Performed by: STUDENT IN AN ORGANIZED HEALTH CARE EDUCATION/TRAINING PROGRAM

## 2022-10-21 PROCEDURE — 1159F MED LIST DOCD IN RCRD: CPT | Mod: CPTII,S$GLB,, | Performed by: STUDENT IN AN ORGANIZED HEALTH CARE EDUCATION/TRAINING PROGRAM

## 2022-10-21 PROCEDURE — 3078F DIAST BP <80 MM HG: CPT | Mod: CPTII,S$GLB,, | Performed by: STUDENT IN AN ORGANIZED HEALTH CARE EDUCATION/TRAINING PROGRAM

## 2022-10-21 RX ORDER — SPIRONOLACTONE 50 MG/1
TABLET, FILM COATED ORAL
COMMUNITY
Start: 2022-08-08 | End: 2023-10-02

## 2022-10-21 NOTE — PATIENT INSTRUCTIONS
Clarify dose of lipitor  CBC, CMP, Lipid, A1C, TSH, microalbumin creatine ratio, sti screening  Documentation from Dr. Rodriguez   A1C in 6 months

## 2022-10-21 NOTE — PROGRESS NOTES
"[facility]    Subjective:       Patient ID: Susu Su is a 27 y.o. female.    Chief Complaint: Establish Care      History was obtained from the patient and supplemented through chart review.  This patient has been seen by an [facility] internal medicine physician in the past 3 years but is new to me.    She was seeing Dr. Bahena prior.    HPI:    Patient is a 27 y.o. female who presents to establish care.    DMT1  Prior endo 2012 Dr. Sales  Then has continued with Dr. Venus Perez from [facility] due to being in [place] for 10 years  A1C 6.9 11/2021  Talks to Dr. Perez every 3 months, seeing her appt Jan  Last Oct 4th  Has CGM and insulin pump  Appt upcomining Monday with Dr. Rodriguez  ?glucoses higher with cortisol issues lately (9/11- three weeks, boyfriend breakup)    Mono July 2021    Fam hx of HLD  Saw cardiologist   Lipitor 10     Medical History  Past Medical History:   Diagnosis Date    Diabetes mellitus type I     Family history of high cholesterol     H/o Atorvastin 5mg     Type I (juvenile type) diabetes mellitus without mention of complication, uncontrolled 8/6/2012       Review of Systems   Constitutional:  Negative for fever.   HENT:  Negative for trouble swallowing.    Respiratory:  Negative for shortness of breath.    Cardiovascular:  Negative for chest pain.   Gastrointestinal:  Negative for constipation, diarrhea, nausea and vomiting.   Musculoskeletal:  Negative for gait problem.   Neurological:  Negative for dizziness and seizures.   Psychiatric/Behavioral:  Negative for hallucinations.        Surgical hx, family hx, social hx   Have been reviewed      Current Outpatient Medications:     atorvastatin (LIPITOR) 10 MG tablet, Take 5 mg by mouth once daily. , Disp: , Rfl:     BD INSULIN SYRINGE ULT-FINE II 0.3 mL 31 x 5/16" Syrg, USE AS DIRECTED 5 TIMES A DAY FOR INSULIN ADMINISTRATION, Disp: 500 each, Rfl: 1    glucagon, human recombinant, (GLUCAGON) 1 mg Kit, Inject 1 mL (1 mg total) " "into the skin as needed (for emergency hypoglycemic episode)., Disp: 3 kit, Rfl: 1    insulin aspart U-100 (NOVOLOG U-100 INSULIN ASPART) 100 unit/mL injection, ACCORDING TO YOUR SLIDING SCALE, Disp: 10 mL, Rfl: 4    insulin glargine,hum.rec.anlog (LANTUS U-100 INSULIN SUBQ), Inject into the skin., Disp: , Rfl:     insulin lispro 100 unit/mL injection, Inject into the skin 3 (three) times daily before meals., Disp: , Rfl:     insulin syringe-needle U-100 1/2 mL 30 x 5/16" Syrg, For four to five times administration of insulin., Disp: 300 each, Rfl: 4    lancets (FREESTYLE LANCETS) Misc, 6 Sticks by Misc.(Non-Drug; Combo Route) route once daily., Disp: 700 each, Rfl: 3    levonorgestreL (MIRENA) 20 mcg/24 hours (7 yrs) 52 mg IUD, 1 each by Intrauterine route once., Disp: , Rfl:     spironolactone (ALDACTONE) 50 MG tablet, , Disp: , Rfl:     folic acid-vit B6-vit B12 2.5-25-2 mg (FOLBIC OR EQUIV) 2.5-25-2 mg Tab, Take 1 tablet by mouth once daily., Disp: 90 tablet, Rfl: 3    Objective:        Body mass index is 22.78 kg/m².  Vitals:    10/21/22 0900   BP: 122/76   Pulse: (!) 59   SpO2: 100%   Weight: 72 kg (158 lb 11.7 oz)   Height: 5' 10" (1.778 m)   PainSc:   4     Physical Exam  Vitals and nursing note reviewed.   Constitutional:       General: She is not in acute distress.     Appearance: Normal appearance. She is not ill-appearing.   HENT:      Head: Normocephalic and atraumatic.   Eyes:      General: No scleral icterus.  Cardiovascular:      Rate and Rhythm: Normal rate and regular rhythm.      Heart sounds: Normal heart sounds.   Pulmonary:      Effort: Pulmonary effort is normal.   Abdominal:      General: There is no distension.   Musculoskeletal:         General: No deformity.      Cervical back: Normal range of motion.   Feet:      Right foot:      Protective Sensation: 6 sites tested.  6 sites sensed.      Skin integrity: Skin integrity normal.      Toenail Condition: Right toenails are normal.      Left " foot:      Protective Sensation: 6 sites tested.  6 sites sensed.      Skin integrity: Skin integrity normal.      Toenail Condition: Left toenails are normal.   Skin:     General: Skin is warm and dry.   Neurological:      Mental Status: She is alert and oriented to person, place, and time.   Psychiatric:         Behavior: Behavior normal.         Lab Results   Component Value Date    WBC 4.81 11/29/2021    HGB 13.9 11/29/2021    HCT 42.3 11/29/2021     11/29/2021    CHOL 171 11/29/2021    TRIG 69 11/29/2021    HDL 58 11/29/2021    ALT 16 11/29/2021    AST 22 11/29/2021     11/29/2021    K 4.9 11/29/2021     11/29/2021    CREATININE 0.9 11/29/2021    BUN 14 11/29/2021    CO2 22 (L) 11/29/2021    TSH 2.328 11/29/2021    HGBA1C 6.9 (H) 11/29/2021       The ASCVD Risk score (Justin DK, et al., 2019) failed to calculate for the following reasons:    The 2019 ASCVD risk score is only valid for ages 40 to 79    (Imaging have been independently reviewed)      Assessment:         1. Annual physical exam    2. Screening examination for STD (sexually transmitted disease)    3. Type 1 diabetes mellitus without complication          Plan:     Susu was seen today for establish care.    Diagnoses and all orders for this visit:    Annual physical exam  -     Comprehensive Metabolic Panel; Future  -     Lipid Panel; Future  -     TSH; Future  -     CBC Auto Differential; Future  -     Hemoglobin A1C; Future  -     Urinalysis, Reflex to Urine Culture Urine, Clean Catch; Future    Screening examination for STD (sexually transmitted disease)  -     HIV 1/2 Ag/Ab (4th Gen); Future  -     RPR; Future  -     C. trachomatis/N. gonorrhoeae by AMP DNA [facility]; Urine; Future  -     HEPATITIS PANEL, ACUTE; Future    Type 1 diabetes mellitus without complication  -     Microalbumin/Creatinine Ratio, Urine; Future  -     Hemoglobin A1C; Future      Health Maintenance  - Lipids: ordered  - A1C: fasting glucose  - Colon Ca  Screen: great grandparent past their 40's  - Immunizations: covid vaccinated and boosted once, needs bivalent and flu    Women's health  - Pap: Davis BOWLES 8/29/2022  - Mammo: na (family history of at age 60)  - Dexa: na  - Contraception: mirena IUD placed 2017, will need hysteroscopy to remov    DM  - Eye exam: Michael upcoming, questionable changes in retina  - Foot exam:  10/21/22  - Statin if DM: statin   - Microalbum/creatine: ordered  - Ace-I if diabetic and no contraindications: na      Follow up in about 1 year (around 10/21/2023). or sooner prn        All medications were reviewed including potential side effects and risks/benefits.  Pt was counseled to call back if anything worsens or if questions arise.        Kamran Zhang MD  Family Medicine  [facility]  [address]  [address] [place] [id number]  Phone [phone]  Fax [phone]

## 2022-10-25 ENCOUNTER — PATIENT MESSAGE (OUTPATIENT)
Dept: INTERNAL MEDICINE | Facility: CLINIC | Age: 27
End: 2022-10-25
Payer: COMMERCIAL

## 2022-10-28 ENCOUNTER — LAB VISIT (OUTPATIENT)
Dept: LAB | Facility: OTHER | Age: 27
End: 2022-10-28
Attending: STUDENT IN AN ORGANIZED HEALTH CARE EDUCATION/TRAINING PROGRAM
Payer: COMMERCIAL

## 2022-10-28 ENCOUNTER — PATIENT MESSAGE (OUTPATIENT)
Dept: INTERNAL MEDICINE | Facility: CLINIC | Age: 27
End: 2022-10-28
Payer: COMMERCIAL

## 2022-10-28 DIAGNOSIS — Z11.3 SCREENING EXAMINATION FOR STD (SEXUALLY TRANSMITTED DISEASE): ICD-10-CM

## 2022-10-28 DIAGNOSIS — E78.00 PURE HYPERCHOLESTEROLEMIA: ICD-10-CM

## 2022-10-28 DIAGNOSIS — Z00.00 ANNUAL PHYSICAL EXAM: ICD-10-CM

## 2022-10-28 LAB
ALBUMIN SERPL BCP-MCNC: 4 G/DL (ref 3.5–5.2)
ALP SERPL-CCNC: 71 U/L (ref 55–135)
ALT SERPL W/O P-5'-P-CCNC: 13 U/L (ref 10–44)
ANION GAP SERPL CALC-SCNC: 5 MMOL/L (ref 8–16)
AST SERPL-CCNC: 13 U/L (ref 10–40)
BASOPHILS # BLD AUTO: 0.08 K/UL (ref 0–0.2)
BASOPHILS NFR BLD: 1.5 % (ref 0–1.9)
BILIRUB SERPL-MCNC: 0.4 MG/DL (ref 0.1–1)
BUN SERPL-MCNC: 16 MG/DL (ref 6–20)
CALCIUM SERPL-MCNC: 9.5 MG/DL (ref 8.7–10.5)
CHLORIDE SERPL-SCNC: 106 MMOL/L (ref 95–110)
CHOLEST SERPL-MCNC: 159 MG/DL (ref 120–199)
CHOLEST/HDLC SERPL: 2.5 {RATIO} (ref 2–5)
CO2 SERPL-SCNC: 27 MMOL/L (ref 23–29)
CREAT SERPL-MCNC: 0.9 MG/DL (ref 0.5–1.4)
DIFFERENTIAL METHOD: ABNORMAL
EOSINOPHIL # BLD AUTO: 0.3 K/UL (ref 0–0.5)
EOSINOPHIL NFR BLD: 5.7 % (ref 0–8)
ERYTHROCYTE [DISTWIDTH] IN BLOOD BY AUTOMATED COUNT: 12.4 % (ref 11.5–14.5)
EST. GFR  (NO RACE VARIABLE): >60 ML/MIN/1.73 M^2
ESTIMATED AVG GLUCOSE: 146 MG/DL (ref 68–131)
GLUCOSE SERPL-MCNC: 116 MG/DL (ref 70–110)
HAV IGM SERPL QL IA: NORMAL
HBA1C MFR BLD: 6.7 % (ref 4–5.6)
HBV CORE IGM SERPL QL IA: NORMAL
HBV SURFACE AG SERPL QL IA: NORMAL
HCT VFR BLD AUTO: 41.8 % (ref 37–48.5)
HCV AB SERPL QL IA: NORMAL
HDLC SERPL-MCNC: 63 MG/DL (ref 40–75)
HDLC SERPL: 39.6 % (ref 20–50)
HGB BLD-MCNC: 14.1 G/DL (ref 12–16)
HIV 1+2 AB+HIV1 P24 AG SERPL QL IA: NORMAL
IMM GRANULOCYTES # BLD AUTO: 0.01 K/UL (ref 0–0.04)
IMM GRANULOCYTES NFR BLD AUTO: 0.2 % (ref 0–0.5)
LDLC SERPL CALC-MCNC: 82.2 MG/DL (ref 63–159)
LYMPHOCYTES # BLD AUTO: 2.1 K/UL (ref 1–4.8)
LYMPHOCYTES NFR BLD: 38.8 % (ref 18–48)
MCH RBC QN AUTO: 33.8 PG (ref 27–31)
MCHC RBC AUTO-ENTMCNC: 33.7 G/DL (ref 32–36)
MCV RBC AUTO: 100 FL (ref 82–98)
MONOCYTES # BLD AUTO: 0.7 K/UL (ref 0.3–1)
MONOCYTES NFR BLD: 12.8 % (ref 4–15)
NEUTROPHILS # BLD AUTO: 2.3 K/UL (ref 1.8–7.7)
NEUTROPHILS NFR BLD: 41 % (ref 38–73)
NONHDLC SERPL-MCNC: 96 MG/DL
NRBC BLD-RTO: 0 /100 WBC
PLATELET # BLD AUTO: 244 K/UL (ref 150–450)
PMV BLD AUTO: 9.8 FL (ref 9.2–12.9)
POTASSIUM SERPL-SCNC: 4.3 MMOL/L (ref 3.5–5.1)
PROT SERPL-MCNC: 6.8 G/DL (ref 6–8.4)
RBC # BLD AUTO: 4.17 M/UL (ref 4–5.4)
SODIUM SERPL-SCNC: 138 MMOL/L (ref 136–145)
TRIGL SERPL-MCNC: 69 MG/DL (ref 30–150)
TSH SERPL DL<=0.005 MIU/L-ACNC: 2.26 UIU/ML (ref 0.4–4)
WBC # BLD AUTO: 5.47 K/UL (ref 3.9–12.7)

## 2022-10-28 PROCEDURE — 83036 HEMOGLOBIN GLYCOSYLATED A1C: CPT | Performed by: STUDENT IN AN ORGANIZED HEALTH CARE EDUCATION/TRAINING PROGRAM

## 2022-10-28 PROCEDURE — 84443 ASSAY THYROID STIM HORMONE: CPT | Performed by: STUDENT IN AN ORGANIZED HEALTH CARE EDUCATION/TRAINING PROGRAM

## 2022-10-28 PROCEDURE — 86592 SYPHILIS TEST NON-TREP QUAL: CPT | Performed by: STUDENT IN AN ORGANIZED HEALTH CARE EDUCATION/TRAINING PROGRAM

## 2022-10-28 PROCEDURE — 87389 HIV-1 AG W/HIV-1&-2 AB AG IA: CPT | Performed by: STUDENT IN AN ORGANIZED HEALTH CARE EDUCATION/TRAINING PROGRAM

## 2022-10-28 PROCEDURE — 85025 COMPLETE CBC W/AUTO DIFF WBC: CPT | Performed by: STUDENT IN AN ORGANIZED HEALTH CARE EDUCATION/TRAINING PROGRAM

## 2022-10-28 PROCEDURE — 80074 ACUTE HEPATITIS PANEL: CPT | Performed by: STUDENT IN AN ORGANIZED HEALTH CARE EDUCATION/TRAINING PROGRAM

## 2022-10-28 PROCEDURE — 80061 LIPID PANEL: CPT | Performed by: STUDENT IN AN ORGANIZED HEALTH CARE EDUCATION/TRAINING PROGRAM

## 2022-10-28 PROCEDURE — 80053 COMPREHEN METABOLIC PANEL: CPT | Performed by: STUDENT IN AN ORGANIZED HEALTH CARE EDUCATION/TRAINING PROGRAM

## 2022-10-28 PROCEDURE — 36415 COLL VENOUS BLD VENIPUNCTURE: CPT | Performed by: STUDENT IN AN ORGANIZED HEALTH CARE EDUCATION/TRAINING PROGRAM

## 2022-10-28 NOTE — TELEPHONE ENCOUNTER
Unlikely that short term use of the cough medicines would have affected MCV or MCH, please let patient know, thank you!

## 2022-10-30 ENCOUNTER — OFFICE VISIT (OUTPATIENT)
Dept: URGENT CARE | Facility: CLINIC | Age: 27
End: 2022-10-30
Payer: COMMERCIAL

## 2022-10-30 ENCOUNTER — PATIENT MESSAGE (OUTPATIENT)
Dept: INTERNAL MEDICINE | Facility: CLINIC | Age: 27
End: 2022-10-30
Payer: COMMERCIAL

## 2022-10-30 VITALS
BODY MASS INDEX: 22.62 KG/M2 | TEMPERATURE: 97 F | RESPIRATION RATE: 18 BRPM | HEART RATE: 88 BPM | SYSTOLIC BLOOD PRESSURE: 143 MMHG | DIASTOLIC BLOOD PRESSURE: 95 MMHG | WEIGHT: 158 LBS | HEIGHT: 70 IN | OXYGEN SATURATION: 98 %

## 2022-10-30 DIAGNOSIS — R09.81 NASAL CONGESTION: ICD-10-CM

## 2022-10-30 DIAGNOSIS — H66.90 OTITIS MEDIA, UNSPECIFIED LATERALITY, UNSPECIFIED OTITIS MEDIA TYPE: Primary | ICD-10-CM

## 2022-10-30 DIAGNOSIS — H93.8X3 PRESSURE SENSATION IN BOTH EARS: ICD-10-CM

## 2022-10-30 DIAGNOSIS — H66.90 EAR INFECTION: ICD-10-CM

## 2022-10-30 DIAGNOSIS — T36.95XA ANTIBIOTIC-INDUCED YEAST INFECTION: ICD-10-CM

## 2022-10-30 DIAGNOSIS — B37.9 ANTIBIOTIC-INDUCED YEAST INFECTION: ICD-10-CM

## 2022-10-30 DIAGNOSIS — H92.01 RIGHT EAR PAIN: ICD-10-CM

## 2022-10-30 DIAGNOSIS — H66.001 NON-RECURRENT ACUTE SUPPURATIVE OTITIS MEDIA OF RIGHT EAR WITHOUT SPONTANEOUS RUPTURE OF TYMPANIC MEMBRANE: Primary | ICD-10-CM

## 2022-10-30 PROCEDURE — 3066F PR DOCUMENTATION OF TREATMENT FOR NEPHROPATHY: ICD-10-PCS | Mod: CPTII,S$GLB,, | Performed by: NURSE PRACTITIONER

## 2022-10-30 PROCEDURE — 99213 OFFICE O/P EST LOW 20 MIN: CPT | Mod: S$GLB,,, | Performed by: NURSE PRACTITIONER

## 2022-10-30 PROCEDURE — 3066F NEPHROPATHY DOC TX: CPT | Mod: CPTII,S$GLB,, | Performed by: NURSE PRACTITIONER

## 2022-10-30 PROCEDURE — 3077F PR MOST RECENT SYSTOLIC BLOOD PRESSURE >= 140 MM HG: ICD-10-PCS | Mod: CPTII,S$GLB,, | Performed by: NURSE PRACTITIONER

## 2022-10-30 PROCEDURE — 1160F RVW MEDS BY RX/DR IN RCRD: CPT | Mod: CPTII,S$GLB,, | Performed by: NURSE PRACTITIONER

## 2022-10-30 PROCEDURE — 1160F PR REVIEW ALL MEDS BY PRESCRIBER/CLIN PHARMACIST DOCUMENTED: ICD-10-PCS | Mod: CPTII,S$GLB,, | Performed by: NURSE PRACTITIONER

## 2022-10-30 PROCEDURE — 3044F HG A1C LEVEL LT 7.0%: CPT | Mod: CPTII,S$GLB,, | Performed by: NURSE PRACTITIONER

## 2022-10-30 PROCEDURE — 3080F DIAST BP >= 90 MM HG: CPT | Mod: CPTII,S$GLB,, | Performed by: NURSE PRACTITIONER

## 2022-10-30 PROCEDURE — 99213 PR OFFICE/OUTPT VISIT, EST, LEVL III, 20-29 MIN: ICD-10-PCS | Mod: S$GLB,,, | Performed by: NURSE PRACTITIONER

## 2022-10-30 PROCEDURE — 1159F MED LIST DOCD IN RCRD: CPT | Mod: CPTII,S$GLB,, | Performed by: NURSE PRACTITIONER

## 2022-10-30 PROCEDURE — 3061F NEG MICROALBUMINURIA REV: CPT | Mod: CPTII,S$GLB,, | Performed by: NURSE PRACTITIONER

## 2022-10-30 PROCEDURE — 3077F SYST BP >= 140 MM HG: CPT | Mod: CPTII,S$GLB,, | Performed by: NURSE PRACTITIONER

## 2022-10-30 PROCEDURE — 3044F PR MOST RECENT HEMOGLOBIN A1C LEVEL <7.0%: ICD-10-PCS | Mod: CPTII,S$GLB,, | Performed by: NURSE PRACTITIONER

## 2022-10-30 PROCEDURE — 3061F PR NEG MICROALBUMINURIA RESULT DOCUMENTED/REVIEW: ICD-10-PCS | Mod: CPTII,S$GLB,, | Performed by: NURSE PRACTITIONER

## 2022-10-30 PROCEDURE — 3080F PR MOST RECENT DIASTOLIC BLOOD PRESSURE >= 90 MM HG: ICD-10-PCS | Mod: CPTII,S$GLB,, | Performed by: NURSE PRACTITIONER

## 2022-10-30 PROCEDURE — 1159F PR MEDICATION LIST DOCUMENTED IN MEDICAL RECORD: ICD-10-PCS | Mod: CPTII,S$GLB,, | Performed by: NURSE PRACTITIONER

## 2022-10-30 RX ORDER — AZELASTINE 1 MG/ML
1 SPRAY, METERED NASAL 2 TIMES DAILY PRN
Qty: 30 ML | Refills: 0 | Status: SHIPPED | OUTPATIENT
Start: 2022-10-30 | End: 2023-10-02

## 2022-10-30 RX ORDER — FLUCONAZOLE 150 MG/1
150 TABLET ORAL DAILY
Qty: 2 TABLET | Refills: 0 | Status: SHIPPED | OUTPATIENT
Start: 2022-10-30 | End: 2022-11-01

## 2022-10-30 RX ORDER — AMOXICILLIN AND CLAVULANATE POTASSIUM 875; 125 MG/1; MG/1
1 TABLET, FILM COATED ORAL EVERY 12 HOURS
Qty: 20 TABLET | Refills: 0 | Status: SHIPPED | OUTPATIENT
Start: 2022-10-30 | End: 2022-11-09

## 2022-10-30 RX ORDER — ACETAMINOPHEN 500 MG
1000 TABLET ORAL
Status: COMPLETED | OUTPATIENT
Start: 2022-10-30 | End: 2022-10-30

## 2022-10-30 RX ADMIN — Medication 1000 MG: at 04:10

## 2022-10-30 NOTE — PROGRESS NOTES
"Subjective:       Patient ID: Susu Su is a 27 y.o. female.    Vitals:  height is 5' 10" (1.778 m) and weight is 71.7 kg (158 lb). Her temperature is 97.2 °F (36.2 °C). Her blood pressure is 143/95 (abnormal) and her pulse is 88. Her respiration is 18 and oxygen saturation is 98%.     Chief Complaint: Ear Problem (i think i have an ear infection and im in great pain - Entered by patient)    Patient has had sever right ear pain since 11:45AM today.     27 year old female presents to clinic with reports of right ear pain rated at a 9 on a scale 0-10 that started earlier today. Treating with sudafed and OTC ear drops for pain. History positive for swimming, nasal congestion, watery nasal secretions none in the past week. Patient reports antibiotic induced yeast infection.    Otalgia   There is pain in the right ear. This is a new problem. There has been no fever. The pain is at a severity of 8/10. Pertinent negatives include no abdominal pain, ear discharge, headaches, hearing loss, neck pain, rhinorrhea, sore throat or vomiting. She has tried ear drops for the symptoms.     Constitution: Negative for chills, sweating, fatigue and fever.   HENT:  Positive for ear pain, congestion and postnasal drip. Negative for ear discharge, hearing loss, sinus pressure and sore throat.    Neck: Negative for neck pain.   Gastrointestinal:  Negative for abdominal pain, nausea and vomiting.   Musculoskeletal:  Negative for muscle ache.   Neurological:  Negative for headaches.     Objective:      Physical Exam   Constitutional: She is oriented to person, place, and time. She appears well-developed. She is cooperative.  Non-toxic appearance. She does not appear ill. No distress.   HENT:   Head: Normocephalic and atraumatic.   Ears:   Right Ear: Hearing, external ear and ear canal normal. Tympanic membrane is erythematous.   Left Ear: Hearing, external ear and ear canal normal. Tympanic membrane is bulging.   Nose: Mucosal " edema and rhinorrhea present. No nasal deformity. No epistaxis. Right sinus exhibits maxillary sinus tenderness. Right sinus exhibits no frontal sinus tenderness. Left sinus exhibits maxillary sinus tenderness. Left sinus exhibits no frontal sinus tenderness.   Mouth/Throat: Uvula is midline, oropharynx is clear and moist and mucous membranes are normal. No trismus in the jaw. Normal dentition. No uvula swelling. No oropharyngeal exudate, posterior oropharyngeal edema or posterior oropharyngeal erythema.   Eyes: Conjunctivae and lids are normal. No scleral icterus.   Neck: Trachea normal and phonation normal. Neck supple. No edema present. No erythema present. No neck rigidity present.   Cardiovascular: Normal rate, regular rhythm, normal heart sounds and normal pulses.   Pulmonary/Chest: Effort normal and breath sounds normal. No respiratory distress. She has no decreased breath sounds. She has no rhonchi.   Abdominal: Normal appearance.   Musculoskeletal: Normal range of motion.         General: No deformity. Normal range of motion.   Neurological: She is alert and oriented to person, place, and time. She exhibits normal muscle tone. Coordination normal.   Skin: Skin is warm, dry, intact, not diaphoretic and not pale.   Psychiatric: Her speech is normal and behavior is normal. Judgment and thought content normal.   Nursing note and vitals reviewed.      Assessment:       1. Non-recurrent acute suppurative otitis media of right ear without spontaneous rupture of tympanic membrane    2. Right ear pain    3. Nasal congestion    4. Pressure sensation in both ears          Plan:         Non-recurrent acute suppurative otitis media of right ear without spontaneous rupture of tympanic membrane    Right ear pain  -     acetaminophen tablet 1,000 mg    Nasal congestion    Pressure sensation in both ears    Other orders  -     amoxicillin-clavulanate 875-125mg (AUGMENTIN) 875-125 mg per tablet; Take 1 tablet by mouth every  12 (twelve) hours. for 10 days  Dispense: 20 tablet; Refill: 0  -     azelastine (ASTELIN) 137 mcg (0.1 %) nasal spray; 1 spray (137 mcg total) by Nasal route 2 (two) times daily as needed for Rhinitis.  Dispense: 30 mL; Refill: 0       Patient Instructions   Please drink plenty of fluids.  Please get plenty of rest.  Please return here or go to the Emergency Department for any concerns or worsening of condition.  If you were prescribed antibiotics, please take them to completion.  If you do not have Hypertension or any history of palpitations, it is ok to take over the counter Sudafed or Mucinex D or Allegra-D or Claritin-D or Zyrtec-D.  If you do take one of the above, it is ok to combine that with plain over the counter Mucinex or Allegra or Claritin or Zyrtec.  If for example you are taking Zyrtec -D, you can combine that with Mucinex, but not Mucinex-D.  If you are taking Mucinex-D, you can combine that with plain Allegra or Claritin or Zyrtec.   If you do have Hypertension or palpitations, it is safe to take Coricidin HBP for relief of sinus symptoms.  Nasal irrigation with a saline spray or Netti Pot like device per their directions is also recommended.  If not allergic, please take over the counter Tylenol (Acetaminophen) and/or Motrin (Ibuprofen) as directed for control of pain and/or fever.  Please follow up with your primary care doctor or specialist as needed.

## 2022-10-30 NOTE — PATIENT INSTRUCTIONS
Please drink plenty of fluids.  Please get plenty of rest.  Please return here or go to the Emergency Department for any concerns or worsening of condition.  If you were prescribed antibiotics, please take them to completion.  If you do not have Hypertension or any history of palpitations, it is ok to take over the counter Sudafed or Mucinex D or Allegra-D or Claritin-D or Zyrtec-D.  If you do take one of the above, it is ok to combine that with plain over the counter Mucinex or Allegra or Claritin or Zyrtec.  If for example you are taking Zyrtec -D, you can combine that with Mucinex, but not Mucinex-D.  If you are taking Mucinex-D, you can combine that with plain Allegra or Claritin or Zyrtec.   If you do have Hypertension or palpitations, it is safe to take Coricidin HBP for relief of sinus symptoms.  Nasal irrigation with a saline spray or Netti Pot like device per their directions is also recommended.  If not allergic, please take over the counter Tylenol (Acetaminophen) and/or Motrin (Ibuprofen) as directed for control of pain and/or fever.  Please follow up with your primary care doctor or specialist as needed.

## 2022-10-31 ENCOUNTER — OFFICE VISIT (OUTPATIENT)
Dept: OTOLARYNGOLOGY | Facility: CLINIC | Age: 27
End: 2022-10-31
Payer: COMMERCIAL

## 2022-10-31 ENCOUNTER — NURSE TRIAGE (OUTPATIENT)
Dept: ADMINISTRATIVE | Facility: CLINIC | Age: 27
End: 2022-10-31
Payer: COMMERCIAL

## 2022-10-31 ENCOUNTER — PATIENT MESSAGE (OUTPATIENT)
Dept: INTERNAL MEDICINE | Facility: CLINIC | Age: 27
End: 2022-10-31
Payer: COMMERCIAL

## 2022-10-31 DIAGNOSIS — H66.011 ACUTE SUPPURATIVE OTITIS MEDIA OF RIGHT EAR WITH SPONTANEOUS RUPTURE OF TYMPANIC MEMBRANE, RECURRENCE NOT SPECIFIED: Primary | ICD-10-CM

## 2022-10-31 LAB — RPR SER QL: NORMAL

## 2022-10-31 PROCEDURE — 1159F PR MEDICATION LIST DOCUMENTED IN MEDICAL RECORD: ICD-10-PCS | Mod: CPTII,S$GLB,, | Performed by: OTOLARYNGOLOGY

## 2022-10-31 PROCEDURE — 1159F MED LIST DOCD IN RCRD: CPT | Mod: CPTII,S$GLB,, | Performed by: OTOLARYNGOLOGY

## 2022-10-31 PROCEDURE — 3066F NEPHROPATHY DOC TX: CPT | Mod: CPTII,S$GLB,, | Performed by: OTOLARYNGOLOGY

## 2022-10-31 PROCEDURE — 3061F PR NEG MICROALBUMINURIA RESULT DOCUMENTED/REVIEW: ICD-10-PCS | Mod: CPTII,S$GLB,, | Performed by: OTOLARYNGOLOGY

## 2022-10-31 PROCEDURE — 99999 PR PBB SHADOW E&M-EST. PATIENT-LVL II: CPT | Mod: PBBFAC,,, | Performed by: OTOLARYNGOLOGY

## 2022-10-31 PROCEDURE — 99202 OFFICE O/P NEW SF 15 MIN: CPT | Mod: S$GLB,,, | Performed by: OTOLARYNGOLOGY

## 2022-10-31 PROCEDURE — 99202 PR OFFICE/OUTPT VISIT, NEW, LEVL II, 15-29 MIN: ICD-10-PCS | Mod: S$GLB,,, | Performed by: OTOLARYNGOLOGY

## 2022-10-31 PROCEDURE — 1160F PR REVIEW ALL MEDS BY PRESCRIBER/CLIN PHARMACIST DOCUMENTED: ICD-10-PCS | Mod: CPTII,S$GLB,, | Performed by: OTOLARYNGOLOGY

## 2022-10-31 PROCEDURE — 1160F RVW MEDS BY RX/DR IN RCRD: CPT | Mod: CPTII,S$GLB,, | Performed by: OTOLARYNGOLOGY

## 2022-10-31 PROCEDURE — 3066F PR DOCUMENTATION OF TREATMENT FOR NEPHROPATHY: ICD-10-PCS | Mod: CPTII,S$GLB,, | Performed by: OTOLARYNGOLOGY

## 2022-10-31 PROCEDURE — 3044F HG A1C LEVEL LT 7.0%: CPT | Mod: CPTII,S$GLB,, | Performed by: OTOLARYNGOLOGY

## 2022-10-31 PROCEDURE — 3044F PR MOST RECENT HEMOGLOBIN A1C LEVEL <7.0%: ICD-10-PCS | Mod: CPTII,S$GLB,, | Performed by: OTOLARYNGOLOGY

## 2022-10-31 PROCEDURE — 99999 PR PBB SHADOW E&M-EST. PATIENT-LVL II: ICD-10-PCS | Mod: PBBFAC,,, | Performed by: OTOLARYNGOLOGY

## 2022-10-31 PROCEDURE — 3061F NEG MICROALBUMINURIA REV: CPT | Mod: CPTII,S$GLB,, | Performed by: OTOLARYNGOLOGY

## 2022-10-31 RX ORDER — CIPROFLOXACIN AND DEXAMETHASONE 3; 1 MG/ML; MG/ML
4 SUSPENSION/ DROPS AURICULAR (OTIC) 2 TIMES DAILY
Qty: 7.5 ML | Refills: 0 | Status: SHIPPED | OUTPATIENT
Start: 2022-10-31 | End: 2022-11-05

## 2022-10-31 NOTE — PROGRESS NOTES
Chief Complaint: ear drainage.  HPI: Susu presents with a 1 day history of right bloody otorrhea. She had nasal congestion, right aural fullness and severe pain yesterday. She was seen in urgent care and started on augmentin and nasal steroids. Overnight she had bloody otorrhea with relief of pain. She has residual aural fullness.    PMH: diabetes.    PE  General: well appearing  Ears: Right: blood in conchal bowl, EAC. Tympanic membrane with slit like perforation in the anterior inferior quadrant with serous drainage.   Left: normal EAC, Normal Tympanic membrane.    Impression: right acute otitis media with spontaneous perforation.  Plan: continue augmentin, flonase   Start ciprodex.   Ear check before swimming.

## 2022-10-31 NOTE — TELEPHONE ENCOUNTER
"Pt was seen at an urgent care yesterday for an ear infection and was prescribed Amoxicillin. Pt states that her ear started bleeding last night and she woke up with bloody discharge on her pillow. Pt also describes some jaw pain "from clenching so hard from being in pain." Pt made an appointment with her physician for 11am prior to calling triage line. Pt refused triage disposition and states that she will be keeping her appointment for 11am today.   Reason for Disposition   Unexplained bleeding and lasts > 10 minutes or large amount  (Exception: If a few drops of blood, continue with triage.)    Additional Information   Negative: Clear or white discharge and swimmer's ear suspected (e.g., recent swimming, ear pain occurs or increases when the earlobe is pulled up and down)   Negative: Bloody ear discharge and after an ear injury   Negative: Earwax is main concern   Negative: Bloody or clear ear discharge and after a head or face injury    Protocols used: Ear - [id number]    "

## 2022-11-01 ENCOUNTER — TELEPHONE (OUTPATIENT)
Dept: INTERNAL MEDICINE | Facility: CLINIC | Age: 27
End: 2022-11-01
Payer: COMMERCIAL

## 2022-11-01 DIAGNOSIS — D75.89 MACROCYTOSIS: Primary | ICD-10-CM

## 2022-11-01 PROBLEM — E78.5 HLD (HYPERLIPIDEMIA): Status: ACTIVE | Noted: 2022-11-01

## 2022-11-02 ENCOUNTER — PATIENT MESSAGE (OUTPATIENT)
Dept: INTERNAL MEDICINE | Facility: CLINIC | Age: 27
End: 2022-11-02
Payer: COMMERCIAL

## 2022-11-07 ENCOUNTER — TELEPHONE (OUTPATIENT)
Dept: OTOLARYNGOLOGY | Facility: CLINIC | Age: 27
End: 2022-11-07
Payer: COMMERCIAL

## 2022-11-11 ENCOUNTER — LAB VISIT (OUTPATIENT)
Dept: LAB | Facility: OTHER | Age: 27
End: 2022-11-11
Attending: STUDENT IN AN ORGANIZED HEALTH CARE EDUCATION/TRAINING PROGRAM
Payer: COMMERCIAL

## 2022-11-11 DIAGNOSIS — D75.89 MACROCYTOSIS: ICD-10-CM

## 2022-11-11 LAB
FOLATE SERPL-MCNC: 6.2 NG/ML (ref 4–24)
VIT B12 SERPL-MCNC: 428 PG/ML (ref 210–950)

## 2022-11-11 PROCEDURE — 82607 VITAMIN B-12: CPT | Performed by: STUDENT IN AN ORGANIZED HEALTH CARE EDUCATION/TRAINING PROGRAM

## 2022-11-11 PROCEDURE — 36415 COLL VENOUS BLD VENIPUNCTURE: CPT | Performed by: STUDENT IN AN ORGANIZED HEALTH CARE EDUCATION/TRAINING PROGRAM

## 2022-11-11 PROCEDURE — 82746 ASSAY OF FOLIC ACID SERUM: CPT | Performed by: STUDENT IN AN ORGANIZED HEALTH CARE EDUCATION/TRAINING PROGRAM

## 2022-11-15 ENCOUNTER — OFFICE VISIT (OUTPATIENT)
Dept: OTOLARYNGOLOGY | Facility: CLINIC | Age: 27
End: 2022-11-15
Payer: COMMERCIAL

## 2022-11-15 VITALS — WEIGHT: 158.31 LBS | BODY MASS INDEX: 22.71 KG/M2

## 2022-11-15 DIAGNOSIS — H66.011 ACUTE SUPPURATIVE OTITIS MEDIA OF RIGHT EAR WITH SPONTANEOUS RUPTURE OF TYMPANIC MEMBRANE, RECURRENCE NOT SPECIFIED: Primary | ICD-10-CM

## 2022-11-15 PROCEDURE — 3066F NEPHROPATHY DOC TX: CPT | Mod: CPTII,S$GLB,, | Performed by: OTOLARYNGOLOGY

## 2022-11-15 PROCEDURE — 99212 PR OFFICE/OUTPT VISIT, EST, LEVL II, 10-19 MIN: ICD-10-PCS | Mod: S$GLB,,, | Performed by: OTOLARYNGOLOGY

## 2022-11-15 PROCEDURE — 3061F NEG MICROALBUMINURIA REV: CPT | Mod: CPTII,S$GLB,, | Performed by: OTOLARYNGOLOGY

## 2022-11-15 PROCEDURE — 3044F PR MOST RECENT HEMOGLOBIN A1C LEVEL <7.0%: ICD-10-PCS | Mod: CPTII,S$GLB,, | Performed by: OTOLARYNGOLOGY

## 2022-11-15 PROCEDURE — 1159F PR MEDICATION LIST DOCUMENTED IN MEDICAL RECORD: ICD-10-PCS | Mod: CPTII,S$GLB,, | Performed by: OTOLARYNGOLOGY

## 2022-11-15 PROCEDURE — 1159F MED LIST DOCD IN RCRD: CPT | Mod: CPTII,S$GLB,, | Performed by: OTOLARYNGOLOGY

## 2022-11-15 PROCEDURE — 99999 PR PBB SHADOW E&M-EST. PATIENT-LVL III: CPT | Mod: PBBFAC,,, | Performed by: OTOLARYNGOLOGY

## 2022-11-15 PROCEDURE — 3008F PR BODY MASS INDEX (BMI) DOCUMENTED: ICD-10-PCS | Mod: CPTII,S$GLB,, | Performed by: OTOLARYNGOLOGY

## 2022-11-15 PROCEDURE — 99999 PR PBB SHADOW E&M-EST. PATIENT-LVL III: ICD-10-PCS | Mod: PBBFAC,,, | Performed by: OTOLARYNGOLOGY

## 2022-11-15 PROCEDURE — 1160F RVW MEDS BY RX/DR IN RCRD: CPT | Mod: CPTII,S$GLB,, | Performed by: OTOLARYNGOLOGY

## 2022-11-15 PROCEDURE — 1160F PR REVIEW ALL MEDS BY PRESCRIBER/CLIN PHARMACIST DOCUMENTED: ICD-10-PCS | Mod: CPTII,S$GLB,, | Performed by: OTOLARYNGOLOGY

## 2022-11-15 PROCEDURE — 3066F PR DOCUMENTATION OF TREATMENT FOR NEPHROPATHY: ICD-10-PCS | Mod: CPTII,S$GLB,, | Performed by: OTOLARYNGOLOGY

## 2022-11-15 PROCEDURE — 3044F HG A1C LEVEL LT 7.0%: CPT | Mod: CPTII,S$GLB,, | Performed by: OTOLARYNGOLOGY

## 2022-11-15 PROCEDURE — 3061F PR NEG MICROALBUMINURIA RESULT DOCUMENTED/REVIEW: ICD-10-PCS | Mod: CPTII,S$GLB,, | Performed by: OTOLARYNGOLOGY

## 2022-11-15 PROCEDURE — 3008F BODY MASS INDEX DOCD: CPT | Mod: CPTII,S$GLB,, | Performed by: OTOLARYNGOLOGY

## 2022-11-15 PROCEDURE — 99212 OFFICE O/P EST SF 10 MIN: CPT | Mod: S$GLB,,, | Performed by: OTOLARYNGOLOGY

## 2022-11-16 ENCOUNTER — PATIENT MESSAGE (OUTPATIENT)
Dept: INTERNAL MEDICINE | Facility: CLINIC | Age: 27
End: 2022-11-16
Payer: COMMERCIAL

## 2022-11-21 NOTE — PROGRESS NOTES
Chief Complaint: follow up right AOM with spontaneous perforation  HPI: Susu returns for an ear check. I saw her for a right acute otitis media with spontaneous perforation. The pain and drainage have resolved. She reports some popping in her ear. Her hearing is nearly normal.   PMH: diabetes.    PE  General: well appearing  Ears: Right: normal auricle and canal. Tympanic membrane intact with no effusion.   Left: normal EAC, Normal Tympanic membrane.    Impression: right acute otitis media with spontaneous perforation. resolved  Plan: okay to resume swimming

## 2022-12-01 ENCOUNTER — PATIENT MESSAGE (OUTPATIENT)
Dept: OBSTETRICS AND GYNECOLOGY | Facility: CLINIC | Age: 27
End: 2022-12-01
Payer: COMMERCIAL

## 2022-12-05 ENCOUNTER — PATIENT MESSAGE (OUTPATIENT)
Dept: INTERNAL MEDICINE | Facility: CLINIC | Age: 27
End: 2022-12-05
Payer: COMMERCIAL

## 2022-12-05 ENCOUNTER — OFFICE VISIT (OUTPATIENT)
Dept: OBSTETRICS AND GYNECOLOGY | Facility: CLINIC | Age: 27
End: 2022-12-05
Attending: OBSTETRICS & GYNECOLOGY
Payer: COMMERCIAL

## 2022-12-05 VITALS
SYSTOLIC BLOOD PRESSURE: 133 MMHG | WEIGHT: 163 LBS | SYSTOLIC BLOOD PRESSURE: 133 MMHG | BODY MASS INDEX: 23.34 KG/M2 | DIASTOLIC BLOOD PRESSURE: 77 MMHG | HEIGHT: 70 IN | HEART RATE: 57 BPM | DIASTOLIC BLOOD PRESSURE: 77 MMHG

## 2022-12-05 DIAGNOSIS — Z11.3 SCREENING EXAMINATION FOR STD (SEXUALLY TRANSMITTED DISEASE): Primary | ICD-10-CM

## 2022-12-05 PROCEDURE — 3078F PR MOST RECENT DIASTOLIC BLOOD PRESSURE < 80 MM HG: ICD-10-PCS | Mod: CPTII,S$GLB,, | Performed by: OBSTETRICS & GYNECOLOGY

## 2022-12-05 PROCEDURE — 3061F PR NEG MICROALBUMINURIA RESULT DOCUMENTED/REVIEW: ICD-10-PCS | Mod: CPTII,S$GLB,, | Performed by: OBSTETRICS & GYNECOLOGY

## 2022-12-05 PROCEDURE — 3061F NEG MICROALBUMINURIA REV: CPT | Mod: CPTII,S$GLB,, | Performed by: OBSTETRICS & GYNECOLOGY

## 2022-12-05 PROCEDURE — 3044F PR MOST RECENT HEMOGLOBIN A1C LEVEL <7.0%: ICD-10-PCS | Mod: CPTII,S$GLB,, | Performed by: OBSTETRICS & GYNECOLOGY

## 2022-12-05 PROCEDURE — 81514 NFCT DS BV&VAGINITIS DNA ALG: CPT | Performed by: OBSTETRICS & GYNECOLOGY

## 2022-12-05 PROCEDURE — 87491 CHLMYD TRACH DNA AMP PROBE: CPT | Performed by: OBSTETRICS & GYNECOLOGY

## 2022-12-05 PROCEDURE — 3044F HG A1C LEVEL LT 7.0%: CPT | Mod: CPTII,S$GLB,, | Performed by: OBSTETRICS & GYNECOLOGY

## 2022-12-05 PROCEDURE — 3075F PR MOST RECENT SYSTOLIC BLOOD PRESS GE 130-139MM HG: ICD-10-PCS | Mod: CPTII,S$GLB,, | Performed by: OBSTETRICS & GYNECOLOGY

## 2022-12-05 PROCEDURE — 99999 PR PBB SHADOW E&M-EST. PATIENT-LVL III: CPT | Mod: PBBFAC,,, | Performed by: OBSTETRICS & GYNECOLOGY

## 2022-12-05 PROCEDURE — 3008F PR BODY MASS INDEX (BMI) DOCUMENTED: ICD-10-PCS | Mod: CPTII,S$GLB,, | Performed by: OBSTETRICS & GYNECOLOGY

## 2022-12-05 PROCEDURE — 1159F MED LIST DOCD IN RCRD: CPT | Mod: CPTII,S$GLB,, | Performed by: OBSTETRICS & GYNECOLOGY

## 2022-12-05 PROCEDURE — 1159F PR MEDICATION LIST DOCUMENTED IN MEDICAL RECORD: ICD-10-PCS | Mod: CPTII,S$GLB,, | Performed by: OBSTETRICS & GYNECOLOGY

## 2022-12-05 PROCEDURE — 99213 OFFICE O/P EST LOW 20 MIN: CPT | Mod: S$GLB,,, | Performed by: OBSTETRICS & GYNECOLOGY

## 2022-12-05 PROCEDURE — 99999 PR PBB SHADOW E&M-EST. PATIENT-LVL III: ICD-10-PCS | Mod: PBBFAC,,, | Performed by: OBSTETRICS & GYNECOLOGY

## 2022-12-05 PROCEDURE — 3075F SYST BP GE 130 - 139MM HG: CPT | Mod: CPTII,S$GLB,, | Performed by: OBSTETRICS & GYNECOLOGY

## 2022-12-05 PROCEDURE — 3078F DIAST BP <80 MM HG: CPT | Mod: CPTII,S$GLB,, | Performed by: OBSTETRICS & GYNECOLOGY

## 2022-12-05 PROCEDURE — 3008F BODY MASS INDEX DOCD: CPT | Mod: CPTII,S$GLB,, | Performed by: OBSTETRICS & GYNECOLOGY

## 2022-12-05 PROCEDURE — 87591 N.GONORRHOEAE DNA AMP PROB: CPT | Performed by: OBSTETRICS & GYNECOLOGY

## 2022-12-05 PROCEDURE — 3066F NEPHROPATHY DOC TX: CPT | Mod: CPTII,S$GLB,, | Performed by: OBSTETRICS & GYNECOLOGY

## 2022-12-05 PROCEDURE — 99213 PR OFFICE/OUTPT VISIT, EST, LEVL III, 20-29 MIN: ICD-10-PCS | Mod: S$GLB,,, | Performed by: OBSTETRICS & GYNECOLOGY

## 2022-12-05 PROCEDURE — 3066F PR DOCUMENTATION OF TREATMENT FOR NEPHROPATHY: ICD-10-PCS | Mod: CPTII,S$GLB,, | Performed by: OBSTETRICS & GYNECOLOGY

## 2022-12-05 NOTE — PROGRESS NOTES
"SUBJECTIVE:   27 y.o. female   presents today for STD testing. . Patient's last menstrual period was 2022..  She denies vaginal discharge or burning. .        Past Medical History:   Diagnosis Date    Diabetes mellitus type I     Family history of high cholesterol     H/o Atorvastin 5mg     Type I (juvenile type) diabetes mellitus without mention of complication, uncontrolled 2012     Past Surgical History:   Procedure Laterality Date    WISDOM TOOTH EXTRACTION Bilateral 2014     Social History     Socioeconomic History    Marital status: Single   Tobacco Use    Smoking status: Never    Smokeless tobacco: Never   Substance and Sexual Activity    Alcohol use: Yes     Alcohol/week: 4.0 standard drinks     Types: 4 Glasses of wine per week     Comment: occ    Drug use: No    Sexual activity: Yes     Partners: Male     Birth control/protection: I.U.D.     Comment: MITZI age 22 ( 2018)     Family History   Problem Relation Age of Onset    Hyperlipidemia Mother     Karla Parkinson White syndrome Mother     Other Father         ablation    Heart disease Paternal Grandfather     Breast cancer Maternal Aunt 60    Ovarian cancer Paternal Aunt         ?    Diabetes Cousin     Colon cancer Other      OB History    Para Term  AB Living   0             SAB IAB Ectopic Multiple Live Births                       Current Outpatient Medications   Medication Sig Dispense Refill    atorvastatin (LIPITOR) 10 MG tablet Take 10 mg by mouth once daily.      BD INSULIN SYRINGE ULT-FINE II 0.3 mL 31 x 5/16" Syrg USE AS DIRECTED 5 TIMES A DAY FOR INSULIN ADMINISTRATION 500 each 1    glucagon, human recombinant, (GLUCAGON) 1 mg Kit Inject 1 mL (1 mg total) into the skin as needed (for emergency hypoglycemic episode). 3 kit 1    insulin aspart U-100 (NOVOLOG U-100 INSULIN ASPART) 100 unit/mL injection ACCORDING TO YOUR SLIDING SCALE 10 mL 4    insulin glargine,hum.rec.anlog (LANTUS U-100 INSULIN SUBQ) Inject " "into the skin.      insulin lispro 100 unit/mL injection Inject into the skin 3 (three) times daily before meals.      insulin syringe-needle U-100 1/2 mL 30 x 5/16" Syrg For four to five times administration of insulin. 300 each 4    lancets (FREESTYLE LANCETS) Misc 6 Sticks by Misc.(Non-Drug; Combo Route) route once daily. 700 each 3    levonorgestreL (MIRENA) 20 mcg/24 hours (7 yrs) 52 mg IUD 1 each by Intrauterine route once.      spironolactone (ALDACTONE) 50 MG tablet       azelastine (ASTELIN) 137 mcg (0.1 %) nasal spray 1 spray (137 mcg total) by Nasal route 2 (two) times daily as needed for Rhinitis. 30 mL 0     No current facility-administered medications for this visit.     Allergies: Patient has no known allergies.     The ASCVD Risk score (Justin SHEIKH, et al., 2019) failed to calculate for the following reasons:    The 2019 ASCVD risk score is only valid for ages 40 to 79      ROS:  Constitutional: no weight loss, weight gain, fever, fatigue  Eyes:  No vision changes, glasses/contacts  ENT/Mouth: No ulcers, sinus problems, ears ringing, headache  Cardiovascular: No inability to lie flat, chest pain, exercise intolerance, swelling, heart palpitations  Respiratory: No wheezing, coughing blood, shortness of breath, or cough  Gastrointestinal: No diarrhea, bloody stool, nausea/vomiting, constipation, gas, hemorrhoids  Genitourinary: No blood in urine, painful urination, urgency of urination, frequency of urination, incomplete emptying, incontinence, abnormal bleeding, painful periods, heavy periods, vaginal discharge, vaginal odor, painful intercourse, sexual problems, bleeding after intercourse.  Musculoskeletal: No muscle weakness  Skin/Breast: No painful breasts, nipple discharge, masses, rash, ulcers  Neurological: No passing out, seizures, numbness, headache  Endocrine: +diabetes, hypothyroid, hyperthyroid, hot flashes, hair loss, abnormal hair growth, acne  Psychiatric: No depression, " crying  Hematologic: No bruises, bleeding, swollen lymph nodes, anemia.      Physical Exam  General- well developed, well nourished  Vulva- no masses, no lesions  Vagina-  no masses, no lesions  Cervix- no Cervical motion tenderness, no lesions  Uterus- normal size, nontender  Adnexa- nontender, no masses  IUD strings not visible    GC/CT  Affirm done    ASSESSMENT:   STD testing    PLAN:   Follow up GC/CT and Affirm

## 2022-12-06 LAB
BACTERIAL VAGINOSIS DNA: NEGATIVE
C TRACH DNA SPEC QL NAA+PROBE: NOT DETECTED
CANDIDA GLABRATA DNA: NEGATIVE
CANDIDA KRUSEI DNA: NEGATIVE
CANDIDA RRNA VAG QL PROBE: NEGATIVE
N GONORRHOEA DNA SPEC QL NAA+PROBE: NOT DETECTED
T VAGINALIS RRNA GENITAL QL PROBE: NEGATIVE

## 2023-01-09 ENCOUNTER — PATIENT MESSAGE (OUTPATIENT)
Dept: INTERNAL MEDICINE | Facility: CLINIC | Age: 28
End: 2023-01-09
Payer: COMMERCIAL

## 2023-01-09 RX ORDER — ATORVASTATIN CALCIUM 10 MG/1
10 TABLET, FILM COATED ORAL DAILY
Qty: 90 TABLET | Refills: 1 | Status: SHIPPED | OUTPATIENT
Start: 2023-01-09 | End: 2023-09-22

## 2023-01-09 NOTE — TELEPHONE ENCOUNTER
No new care gaps identified.  [facility] Embedded Care Gaps. Reference number: [id number]. 1/09/2023   10:43:45 AM CST

## 2023-01-30 ENCOUNTER — PATIENT MESSAGE (OUTPATIENT)
Dept: INTERNAL MEDICINE | Facility: CLINIC | Age: 28
End: 2023-01-30
Payer: COMMERCIAL

## 2023-01-31 ENCOUNTER — PATIENT MESSAGE (OUTPATIENT)
Dept: INTERNAL MEDICINE | Facility: CLINIC | Age: 28
End: 2023-01-31
Payer: COMMERCIAL

## 2023-02-02 ENCOUNTER — PATIENT MESSAGE (OUTPATIENT)
Dept: INTERNAL MEDICINE | Facility: CLINIC | Age: 28
End: 2023-02-02
Payer: COMMERCIAL

## 2023-04-24 ENCOUNTER — LAB VISIT (OUTPATIENT)
Dept: LAB | Facility: OTHER | Age: 28
End: 2023-04-24
Attending: STUDENT IN AN ORGANIZED HEALTH CARE EDUCATION/TRAINING PROGRAM
Payer: COMMERCIAL

## 2023-04-24 DIAGNOSIS — E10.9 TYPE 1 DIABETES MELLITUS WITHOUT COMPLICATION: ICD-10-CM

## 2023-04-24 LAB
ESTIMATED AVG GLUCOSE: 143 MG/DL (ref 68–131)
HBA1C MFR BLD: 6.6 % (ref 4–5.6)

## 2023-04-24 PROCEDURE — 36415 COLL VENOUS BLD VENIPUNCTURE: CPT | Performed by: STUDENT IN AN ORGANIZED HEALTH CARE EDUCATION/TRAINING PROGRAM

## 2023-04-24 PROCEDURE — 83036 HEMOGLOBIN GLYCOSYLATED A1C: CPT | Performed by: STUDENT IN AN ORGANIZED HEALTH CARE EDUCATION/TRAINING PROGRAM

## 2023-05-16 ENCOUNTER — HOSPITAL ENCOUNTER (OUTPATIENT)
Facility: HOSPITAL | Age: 28
Discharge: HOME | End: 2023-05-16
Attending: EMERGENCY MEDICINE | Admitting: EMERGENCY MEDICINE
Payer: COMMERCIAL

## 2023-05-16 ENCOUNTER — PATIENT MESSAGE (OUTPATIENT)
Dept: INTERNAL MEDICINE | Facility: CLINIC | Age: 28
End: 2023-05-16

## 2023-05-16 ENCOUNTER — TELEPHONE (OUTPATIENT)
Dept: INTERNAL MEDICINE | Facility: CLINIC | Age: 28
End: 2023-05-16

## 2023-05-16 VITALS
TEMPERATURE: 99 F | OXYGEN SATURATION: 99 % | HEIGHT: 70 IN | HEART RATE: 77 BPM | RESPIRATION RATE: 18 BRPM | DIASTOLIC BLOOD PRESSURE: 57 MMHG | SYSTOLIC BLOOD PRESSURE: 118 MMHG | BODY MASS INDEX: 22.19 KG/M2 | WEIGHT: 155 LBS

## 2023-05-16 DIAGNOSIS — R73.9 HYPERGLYCEMIA: ICD-10-CM

## 2023-05-16 DIAGNOSIS — R11.2 NAUSEA AND VOMITING, UNSPECIFIED VOMITING TYPE: Primary | ICD-10-CM

## 2023-05-16 LAB
ALBUMIN SERPL BCP-MCNC: 3.1 G/DL (ref 3.5–5.2)
ALBUMIN SERPL BCP-MCNC: 4.3 G/DL (ref 3.5–5.2)
ALLENS TEST: ABNORMAL
ALP SERPL-CCNC: 41 U/L (ref 55–135)
ALP SERPL-CCNC: 61 U/L (ref 55–135)
ALT SERPL W/O P-5'-P-CCNC: 11 U/L (ref 10–44)
ALT SERPL W/O P-5'-P-CCNC: 8 U/L (ref 10–44)
ANION GAP SERPL CALC-SCNC: 8 MMOL/L (ref 8–16)
ANION GAP SERPL CALC-SCNC: 9 MMOL/L (ref 8–16)
AST SERPL-CCNC: 10 U/L (ref 10–40)
AST SERPL-CCNC: 16 U/L (ref 10–40)
B-HCG UR QL: NEGATIVE
B-OH-BUTYR BLD STRIP-SCNC: 0.9 MMOL/L (ref 0–0.5)
BACTERIA #/AREA URNS AUTO: ABNORMAL /HPF
BASOPHILS # BLD AUTO: 0.04 K/UL (ref 0–0.2)
BASOPHILS NFR BLD: 0.4 % (ref 0–1.9)
BILIRUB SERPL-MCNC: 0.5 MG/DL (ref 0.1–1)
BILIRUB SERPL-MCNC: 0.8 MG/DL (ref 0.1–1)
BILIRUB UR QL STRIP: NEGATIVE
BUN SERPL-MCNC: 12 MG/DL (ref 6–20)
BUN SERPL-MCNC: 14 MG/DL (ref 6–20)
CALCIUM SERPL-MCNC: 7.8 MG/DL (ref 8.7–10.5)
CALCIUM SERPL-MCNC: 9.3 MG/DL (ref 8.7–10.5)
CHLORIDE SERPL-SCNC: 106 MMOL/L (ref 95–110)
CHLORIDE SERPL-SCNC: 110 MMOL/L (ref 95–110)
CLARITY UR REFRACT.AUTO: ABNORMAL
CO2 SERPL-SCNC: 18 MMOL/L (ref 23–29)
CO2 SERPL-SCNC: 23 MMOL/L (ref 23–29)
COLOR UR AUTO: YELLOW
CREAT SERPL-MCNC: 0.8 MG/DL (ref 0.5–1.4)
CREAT SERPL-MCNC: 0.8 MG/DL (ref 0.5–1.4)
CTP QC/QA: YES
DIFFERENTIAL METHOD: ABNORMAL
EOSINOPHIL # BLD AUTO: 0 K/UL (ref 0–0.5)
EOSINOPHIL NFR BLD: 0.2 % (ref 0–8)
ERYTHROCYTE [DISTWIDTH] IN BLOOD BY AUTOMATED COUNT: 13 % (ref 11.5–14.5)
EST. GFR  (NO RACE VARIABLE): >60 ML/MIN/1.73 M^2
EST. GFR  (NO RACE VARIABLE): >60 ML/MIN/1.73 M^2
GLUCOSE SERPL-MCNC: 181 MG/DL (ref 70–110)
GLUCOSE SERPL-MCNC: 199 MG/DL (ref 70–110)
GLUCOSE UR QL STRIP: ABNORMAL
HCO3 UR-SCNC: 21.6 MMOL/L (ref 24–28)
HCO3 UR-SCNC: 24.6 MMOL/L (ref 24–28)
HCO3 UR-SCNC: 24.9 MMOL/L (ref 24–28)
HCT VFR BLD AUTO: 44.2 % (ref 37–48.5)
HCV AB SERPL QL IA: NORMAL
HGB BLD-MCNC: 15 G/DL (ref 12–16)
HGB UR QL STRIP: ABNORMAL
HIV 1+2 AB+HIV1 P24 AG SERPL QL IA: NORMAL
IMM GRANULOCYTES # BLD AUTO: 0.04 K/UL (ref 0–0.04)
IMM GRANULOCYTES NFR BLD AUTO: 0.4 % (ref 0–0.5)
KETONES UR QL STRIP: ABNORMAL
LEUKOCYTE ESTERASE UR QL STRIP: ABNORMAL
LIPASE SERPL-CCNC: 11 U/L (ref 4–60)
LYMPHOCYTES # BLD AUTO: 0.2 K/UL (ref 1–4.8)
LYMPHOCYTES NFR BLD: 2 % (ref 18–48)
MCH RBC QN AUTO: 33.3 PG (ref 27–31)
MCHC RBC AUTO-ENTMCNC: 33.9 G/DL (ref 32–36)
MCV RBC AUTO: 98 FL (ref 82–98)
MICROSCOPIC COMMENT: ABNORMAL
MONOCYTES # BLD AUTO: 0.5 K/UL (ref 0.3–1)
MONOCYTES NFR BLD: 4.6 % (ref 4–15)
NEUTROPHILS # BLD AUTO: 10.2 K/UL (ref 1.8–7.7)
NEUTROPHILS NFR BLD: 92.4 % (ref 38–73)
NITRITE UR QL STRIP: NEGATIVE
NRBC BLD-RTO: 0 /100 WBC
PCO2 BLDA: 41.7 MMHG (ref 35–45)
PCO2 BLDA: 43.3 MMHG (ref 35–45)
PCO2 BLDA: 45.6 MMHG (ref 35–45)
PH SMN: 7.31 [PH] (ref 7.35–7.45)
PH SMN: 7.34 [PH] (ref 7.35–7.45)
PH SMN: 7.38 [PH] (ref 7.35–7.45)
PH UR STRIP: 5 [PH] (ref 5–8)
PLATELET # BLD AUTO: 213 K/UL (ref 150–450)
PMV BLD AUTO: 9.9 FL (ref 9.2–12.9)
PO2 BLDA: 16 MMHG (ref 40–60)
PO2 BLDA: 33 MMHG (ref 40–60)
PO2 BLDA: 33 MMHG (ref 40–60)
POC BE: -1 MMOL/L
POC BE: -5 MMOL/L
POC BE: 0 MMOL/L
POC SATURATED O2: 18 % (ref 95–100)
POC SATURATED O2: 57 % (ref 95–100)
POC SATURATED O2: 63 % (ref 95–100)
POC TCO2: 23 MMOL/L (ref 24–29)
POC TCO2: 26 MMOL/L (ref 24–29)
POC TCO2: 26 MMOL/L (ref 24–29)
POCT GLUCOSE: 188 MG/DL (ref 70–110)
POTASSIUM SERPL-SCNC: 4.1 MMOL/L (ref 3.5–5.1)
POTASSIUM SERPL-SCNC: 4.8 MMOL/L (ref 3.5–5.1)
PROT SERPL-MCNC: 5.4 G/DL (ref 6–8.4)
PROT SERPL-MCNC: 7.3 G/DL (ref 6–8.4)
PROT UR QL STRIP: NEGATIVE
RBC # BLD AUTO: 4.5 M/UL (ref 4–5.4)
RBC #/AREA URNS AUTO: 7 /HPF (ref 0–4)
SAMPLE: ABNORMAL
SARS-COV-2 RDRP RESP QL NAA+PROBE: NEGATIVE
SITE: ABNORMAL
SODIUM SERPL-SCNC: 136 MMOL/L (ref 136–145)
SODIUM SERPL-SCNC: 138 MMOL/L (ref 136–145)
SP GR UR STRIP: 1.03 (ref 1–1.03)
SQUAMOUS #/AREA URNS AUTO: 41 /HPF
URN SPEC COLLECT METH UR: ABNORMAL
WBC # BLD AUTO: 11.05 K/UL (ref 3.9–12.7)
WBC #/AREA URNS AUTO: 9 /HPF (ref 0–5)

## 2023-05-16 PROCEDURE — 93010 EKG 12-LEAD: ICD-10-PCS | Mod: ,,, | Performed by: INTERNAL MEDICINE

## 2023-05-16 PROCEDURE — 99213 OFFICE O/P EST LOW 20 MIN: CPT | Mod: ,,, | Performed by: PHYSICIAN ASSISTANT

## 2023-05-16 PROCEDURE — 63600175 PHARM REV CODE 636 W HCPCS: Performed by: EMERGENCY MEDICINE

## 2023-05-16 PROCEDURE — G0378 HOSPITAL OBSERVATION PER HR: HCPCS

## 2023-05-16 PROCEDURE — 83690 ASSAY OF LIPASE: CPT | Performed by: EMERGENCY MEDICINE

## 2023-05-16 PROCEDURE — 85025 COMPLETE CBC W/AUTO DIFF WBC: CPT | Performed by: EMERGENCY MEDICINE

## 2023-05-16 PROCEDURE — 99223 1ST HOSP IP/OBS HIGH 75: CPT | Mod: ,,, | Performed by: PHYSICIAN ASSISTANT

## 2023-05-16 PROCEDURE — 82962 GLUCOSE BLOOD TEST: CPT

## 2023-05-16 PROCEDURE — U0002 COVID-19 LAB TEST NON-CDC: HCPCS | Performed by: EMERGENCY MEDICINE

## 2023-05-16 PROCEDURE — 99900035 HC TECH TIME PER 15 MIN (STAT)

## 2023-05-16 PROCEDURE — 99499 NO LOS: ICD-10-PCS | Mod: ,,, | Performed by: EMERGENCY MEDICINE

## 2023-05-16 PROCEDURE — 86803 HEPATITIS C AB TEST: CPT | Performed by: PHYSICIAN ASSISTANT

## 2023-05-16 PROCEDURE — 80053 COMPREHEN METABOLIC PANEL: CPT | Performed by: EMERGENCY MEDICINE

## 2023-05-16 PROCEDURE — 81001 URINALYSIS AUTO W/SCOPE: CPT | Performed by: EMERGENCY MEDICINE

## 2023-05-16 PROCEDURE — 82803 BLOOD GASES ANY COMBINATION: CPT

## 2023-05-16 PROCEDURE — 99213 PR OFFICE/OUTPT VISIT, EST, LEVL III, 20-29 MIN: ICD-10-PCS | Mod: ,,, | Performed by: PHYSICIAN ASSISTANT

## 2023-05-16 PROCEDURE — 25000003 PHARM REV CODE 250: Performed by: EMERGENCY MEDICINE

## 2023-05-16 PROCEDURE — 96361 HYDRATE IV INFUSION ADD-ON: CPT

## 2023-05-16 PROCEDURE — 96374 THER/PROPH/DIAG INJ IV PUSH: CPT

## 2023-05-16 PROCEDURE — 96375 TX/PRO/DX INJ NEW DRUG ADDON: CPT

## 2023-05-16 PROCEDURE — 80053 COMPREHEN METABOLIC PANEL: CPT | Mod: 91 | Performed by: PHYSICIAN ASSISTANT

## 2023-05-16 PROCEDURE — 99499 UNLISTED E&M SERVICE: CPT | Mod: ,,, | Performed by: EMERGENCY MEDICINE

## 2023-05-16 PROCEDURE — 93010 ELECTROCARDIOGRAM REPORT: CPT | Mod: ,,, | Performed by: INTERNAL MEDICINE

## 2023-05-16 PROCEDURE — 99285 EMERGENCY DEPT VISIT HI MDM: CPT | Mod: 25

## 2023-05-16 PROCEDURE — 93005 ELECTROCARDIOGRAM TRACING: CPT

## 2023-05-16 PROCEDURE — 82010 KETONE BODYS QUAN: CPT | Performed by: EMERGENCY MEDICINE

## 2023-05-16 PROCEDURE — 99223 PR INITIAL HOSPITAL CARE,LEVL III: ICD-10-PCS | Mod: ,,, | Performed by: PHYSICIAN ASSISTANT

## 2023-05-16 PROCEDURE — 87389 HIV-1 AG W/HIV-1&-2 AB AG IA: CPT | Performed by: PHYSICIAN ASSISTANT

## 2023-05-16 PROCEDURE — 81025 URINE PREGNANCY TEST: CPT | Performed by: EMERGENCY MEDICINE

## 2023-05-16 RX ORDER — KETOROLAC TROMETHAMINE 30 MG/ML
10 INJECTION, SOLUTION INTRAMUSCULAR; INTRAVENOUS EVERY 6 HOURS PRN
Status: DISCONTINUED | OUTPATIENT
Start: 2023-05-16 | End: 2023-05-16 | Stop reason: HOSPADM

## 2023-05-16 RX ORDER — PROCHLORPERAZINE EDISYLATE 5 MG/ML
10 INJECTION INTRAMUSCULAR; INTRAVENOUS EVERY 6 HOURS PRN
Status: DISCONTINUED | OUTPATIENT
Start: 2023-05-16 | End: 2023-05-16 | Stop reason: HOSPADM

## 2023-05-16 RX ORDER — ONDANSETRON 2 MG/ML
4 INJECTION INTRAMUSCULAR; INTRAVENOUS EVERY 6 HOURS PRN
Status: DISCONTINUED | OUTPATIENT
Start: 2023-05-16 | End: 2023-05-16 | Stop reason: HOSPADM

## 2023-05-16 RX ORDER — SPIRONOLACTONE 50 MG/1
50 TABLET, FILM COATED ORAL DAILY
Status: DISCONTINUED | OUTPATIENT
Start: 2023-05-17 | End: 2023-05-16 | Stop reason: HOSPADM

## 2023-05-16 RX ORDER — KETOROLAC TROMETHAMINE 30 MG/ML
10 INJECTION, SOLUTION INTRAMUSCULAR; INTRAVENOUS
Status: COMPLETED | OUTPATIENT
Start: 2023-05-16 | End: 2023-05-16

## 2023-05-16 RX ORDER — ATORVASTATIN CALCIUM 10 MG/1
10 TABLET, FILM COATED ORAL DAILY
Status: DISCONTINUED | OUTPATIENT
Start: 2023-05-17 | End: 2023-05-16 | Stop reason: HOSPADM

## 2023-05-16 RX ORDER — ONDANSETRON 4 MG/1
4 TABLET, ORALLY DISINTEGRATING ORAL EVERY 6 HOURS PRN
Qty: 15 TABLET | Refills: 0 | Status: SHIPPED | OUTPATIENT
Start: 2023-05-16 | End: 2023-10-02

## 2023-05-16 RX ORDER — DICYCLOMINE HYDROCHLORIDE 20 MG/1
20 TABLET ORAL 2 TIMES DAILY
Qty: 10 TABLET | Refills: 0 | Status: SHIPPED | OUTPATIENT
Start: 2023-05-16 | End: 2023-05-21

## 2023-05-16 RX ORDER — ONDANSETRON 2 MG/ML
4 INJECTION INTRAMUSCULAR; INTRAVENOUS
Status: COMPLETED | OUTPATIENT
Start: 2023-05-16 | End: 2023-05-16

## 2023-05-16 RX ADMIN — SODIUM CHLORIDE 1000 ML: 9 INJECTION, SOLUTION INTRAVENOUS at 03:05

## 2023-05-16 RX ADMIN — SODIUM CHLORIDE 1000 ML: 9 INJECTION, SOLUTION INTRAVENOUS at 12:05

## 2023-05-16 RX ADMIN — KETOROLAC TROMETHAMINE 10 MG: 30 INJECTION, SOLUTION INTRAMUSCULAR; INTRAVENOUS at 12:05

## 2023-05-16 RX ADMIN — ONDANSETRON 4 MG: 2 INJECTION INTRAMUSCULAR; INTRAVENOUS at 10:05

## 2023-05-16 RX ADMIN — SODIUM CHLORIDE 1000 ML: 9 INJECTION, SOLUTION INTRAVENOUS at 10:05

## 2023-05-16 NOTE — TELEPHONE ENCOUNTER
Patient went to the ED   Patient is brought in by ems from school. As per ems patient has Hx of Depression, taking Prozac. Having family issues. Cut his arm  at school but went to school when it started to bleed.

## 2023-05-16 NOTE — ED TRIAGE NOTES
"Susu Su, an 27 y.o. female presents to the ED complaining of nausea and vomiting since 3am this morning. She explains that she ate a veggie burger yesterday that she believes could have caused her Sx. Denies fevers or cold Sx, no blood noted in emesis. Has a hx of T1DM. Took an  Zofran at home without relief.       LOC: The patient is awake, alert and aware of environment with an appropriate affect, the patient is oriented x 3 and speaking appropriately.   APPEARANCE: Patient appears comfortable and in no acute distress, patient is clean and well groomed.  SKIN: The skin is warm and dry, color consistent with ethnicity.   MUSCULOSKELETAL: Patient moving all extremities spontaneously, no swelling noted.  RESPIRATORY: Airway is open and patent, respirations are spontaneous, patient has a normal effort and rate, no accessory muscle use noted.  CARDIAC: Patient has a normal rate and regular rhythm, no edema noted, capillary refill < 3 seconds.   GASTRO: Soft and non tender to palpation, no distention noted. +nausea and vomiting +cramps.  : Pt denies any pain or frequency with urination.  NEURO: Pt opens eyes spontaneously, behavior appropriate to situation, follows commands.        Chief Complaint   Patient presents with    Vomiting     Acute onset of emesis at 0300 this morning. "I think I have food poisoning." Took an 8mg zofran tablet PTA     Review of patient's allergies indicates:  No Known Allergies  Past Medical History:   Diagnosis Date    Diabetes mellitus type I     Family history of high cholesterol     H/o Atorvastin 5mg     Type I (juvenile type) diabetes mellitus without mention of complication, uncontrolled 2012       "

## 2023-05-16 NOTE — SUBJECTIVE & OBJECTIVE
Interval HPI:   In obs.  BG stable  Eating:  liquids  Nausea: No  Hypoglycemia and intervention: No  Fever: No  TPN and/or TF: No      PMH, PSH, FH, SH updated and reviewed     ROS:      Review of Systems   Constitutional:  Negative for unexpected weight change.   Eyes:  Negative for visual disturbance.   Respiratory:  Negative for cough.    Cardiovascular:  Negative for chest pain.   Gastrointestinal:  Negative for nausea and vomiting.   Endocrine: Negative for polydipsia and polyuria.   Musculoskeletal:  Negative for back pain.   Skin:  Negative for rash.   Neurological:  Negative for syncope.   Psychiatric/Behavioral:  Negative for agitation and dysphoric mood.      Current Medications and/or Treatments Impacting Glycemic Control  Immunotherapy:    Immunosuppressants       None          Steroids:   Hormones (From admission, onward)      None          Pressors:    Autonomic Drugs (From admission, onward)      None          Hyperglycemia/Diabetes Medications:   Antihyperglycemics (From admission, onward)      None             PHYSICAL EXAMINATION:  Vitals:    05/16/23 1505   BP: (!) 106/53   Pulse: 70   Resp: 17   Temp: 99.1 °F (37.3 °C)     Body mass index is 22.24 kg/m².     Physical Exam  Constitutional:       General: She is not in acute distress.     Appearance: Normal appearance. She is not ill-appearing.   HENT:      Head: Normocephalic and atraumatic.      Right Ear: External ear normal.      Left Ear: External ear normal.      Nose: Nose normal.   Cardiovascular:      Rate and Rhythm: Normal rate and regular rhythm.      Heart sounds: No murmur heard.  Pulmonary:      Effort: Pulmonary effort is normal. No respiratory distress.   Abdominal:      General: There is no distension.   Musculoskeletal:         General: No swelling.      Right lower leg: No edema.      Left lower leg: No edema.   Skin:     Findings: No erythema.   Neurological:      General: No focal deficit present.      Mental Status: She is  alert and oriented to person, place, and time.   Psychiatric:         Mood and Affect: Mood normal.         Behavior: Behavior normal.

## 2023-05-16 NOTE — HPI
Reason for Consult: Management of T1DM, Hyperglycemia     Home Diabetes Medications:  Omnipod insulin pump    How often checking glucose at home? >4 x day   BG readings on regimen: 100s  Hypoglycemia on the regimen?  No  Missed doses on regimen?  No    Diabetes Complications include:     Hyperglycemia    Complicating diabetes co morbidities:   None      HPI:   Patient is a 27 y.o. female with multiple episodes of vomiting and nausea since 3:00 a.m. this morning.  She is not sure if she has food poisoning.   Her glucose has been well controlled at home.  No abdominal pain.  Endocrine consulted for bg management.

## 2023-05-16 NOTE — PROGRESS NOTES
ED Observation Unit  Progress Note      HPI   Susu Su is a 27 y.o. female with medical history of T1DM with insulin pump, HLD presenting to the ED with the chief complaint of vomiting. Placed in EDOU for intractable vomiting and Endocrinology consult regarding insulin pump management.    Patient reports multiple episodes of vomiting and nausea since 3:00 a.m. this morning.  She is not sure if she has food poisoning.  She did eat a veggie burger last night, but states no one else that ate with her has been ill.  Denies blood in the vomit.  She had 1 loose stool, but has not been having additional episodes of diarrhea.  No prior abdominal surgeries.  Her glucose has been well controlled at home.  No abdominal pain per se, feels crampy.   Onset:  Gradual  Duration:  Since 3:00 a.m.  Quality:  Cramping  Modifiers:  Tried taking an old Zofran, but believes the prescription may have been   Associated Symptoms:  No fevers, no URI symptoms    Interval History   N/V has improved. Resting comfortably. Awaiting endocrine recs    PMHx   Past Medical History:   Diagnosis Date    Diabetes mellitus type I     Family history of high cholesterol     H/o Atorvastin 5mg     Type I (juvenile type) diabetes mellitus without mention of complication, uncontrolled 2012      Past Surgical History:   Procedure Laterality Date    WISDOM TOOTH EXTRACTION Bilateral 2014        Family Hx   Family History   Problem Relation Age of Onset    Hyperlipidemia Mother     Karla Parkinson White syndrome Mother     Other Father         ablation    Heart disease Paternal Grandfather     Breast cancer Maternal Aunt 60    Ovarian cancer Paternal Aunt         ?    Diabetes Cousin     Colon cancer Other         Social Hx   Social History     Socioeconomic History    Marital status: Single   Tobacco Use    Smoking status: Never    Smokeless tobacco: Never   Substance and Sexual Activity    Alcohol use: Yes     Alcohol/week: 4.0  "standard drinks     Types: 4 Glasses of wine per week     Comment: occ    Drug use: No    Sexual activity: Yes     Partners: Male     Birth control/protection: I.U.D.     Comment: MITZI age 22 ( 2020)        Vital Signs   Vitals:    05/16/23 0929 05/16/23 1148 05/16/23 1505   BP: 114/70 (!) 103/56 (!) 106/53   BP Location:   Right arm   Patient Position:   Lying   Pulse: 77 71 70   Resp: 20 18 17   Temp: 98.5 °F (36.9 °C) 99.4 °F (37.4 °C) 99.1 °F (37.3 °C)   TempSrc: Oral  Oral   SpO2: 100% 100% 99%   Weight: 70.3 kg (155 lb)     Height: 5' 10" (1.778 m)          Review of Systems  Review of Systems   Gastrointestinal:  Positive for abdominal pain.     Brief Physical Exam/Reassessment   Physical Exam  Physical Exam  Vitals and nursing note reviewed.   Constitutional:       General: She is not in acute distress.     Appearance: Normal appearance. She is normal weight. She is not ill-appearing, toxic-appearing or diaphoretic.   HENT:      Head: Normocephalic and atraumatic.      Nose: Nose normal.      Mouth/Throat:      Mouth: Mucous membranes are dry.   Eyes:      Conjunctiva/sclera: Conjunctivae normal.      Pupils: Pupils are equal, round, and reactive to light.   Cardiovascular:      Rate and Rhythm: Normal rate.   Pulmonary:      Effort: Pulmonary effort is normal.   Abdominal:      General: Abdomen is flat. Bowel sounds are normal. There is no distension.      Palpations: Abdomen is soft.      Tenderness: There is no abdominal tenderness.   Musculoskeletal:         General: Normal range of motion.      Cervical back: Neck supple.   Skin:     General: Skin is warm and dry.      Capillary Refill: Capillary refill takes less than 2 seconds.   Neurological:      General: No focal deficit present.      Mental Status: She is alert and oriented to person, place, and time. Mental status is at baseline.   Psychiatric:         Mood and Affect: Mood normal.         Behavior: Behavior normal.         Thought Content: " Thought content normal.         Judgment: Judgment normal.     Labs/Imaging   Labs Reviewed   URINALYSIS, REFLEX TO URINE CULTURE - Abnormal; Notable for the following components:       Result Value    Appearance, UA Hazy (*)     Glucose, UA 2+ (*)     Ketones, UA 3+ (*)     Occult Blood UA 2+ (*)     Leukocytes, UA Trace (*)     All other components within normal limits    Narrative:     Specimen Source->Urine   CBC W/ AUTO DIFFERENTIAL - Abnormal; Notable for the following components:    MCH 33.3 (*)     Gran # (ANC) 10.2 (*)     Lymph # 0.2 (*)     Gran % 92.4 (*)     Lymph % 2.0 (*)     All other components within normal limits    Narrative:     Release to patient->Immediate   COMPREHENSIVE METABOLIC PANEL - Abnormal; Notable for the following components:    Glucose 199 (*)     All other components within normal limits    Narrative:     Release to patient->Immediate   BETA - HYDROXYBUTYRATE, SERUM - Abnormal; Notable for the following components:    Beta-Hydroxybutyrate 0.9 (*)     All other components within normal limits    Narrative:     Release to patient->Immediate   URINALYSIS MICROSCOPIC - Abnormal; Notable for the following components:    RBC, UA 7 (*)     WBC, UA 9 (*)     Bacteria Few (*)     All other components within normal limits    Narrative:     Specimen Source->Urine   POCT GLUCOSE - Abnormal; Notable for the following components:    POCT Glucose 188 (*)     All other components within normal limits   ISTAT PROCEDURE - Abnormal; Notable for the following components:    POC PH 7.340 (*)     POC PCO2 45.6 (*)     POC PO2 16 (*)     POC SATURATED O2 18 (*)     All other components within normal limits   ISTAT PROCEDURE - Abnormal; Notable for the following components:    POC PH 7.306 (*)     POC PO2 33 (*)     POC HCO3 21.6 (*)     POC SATURATED O2 57 (*)     POC TCO2 23 (*)     All other components within normal limits   HIV 1 / 2 ANTIBODY    Narrative:     Release to patient->Immediate    HEPATITIS C ANTIBODY    Narrative:     Release to patient->Immediate   LIPASE    Narrative:     Release to patient->Immediate   SARS-COV-2 RNA AMPLIFICATION, QUAL   COMPREHENSIVE METABOLIC PANEL   POCT URINE PREGNANCY   POCT GLUCOSE MONITORING CONTINUOUS      Imaging Results              X-Ray Chest AP Portable (Final result)  Result time 05/16/23 10:46:18      Final result by Domingo Pineda MD (05/16/23 10:46:18)                   Impression:      No significant intrathoracic abnormality.  No significant detrimental interval change in the appearance of the chest since 07/06/2021 is appreciated, allowing for differences in projection.      Electronically signed by: Domingo Pineda MD  Date:    05/16/2023  Time:    10:46               Narrative:    EXAMINATION:  XR CHEST AP PORTABLE    CLINICAL HISTORY:  hyperglycemia;    TECHNIQUE:  One view    COMPARISON:  Comparison is made to 07/06/2021.    FINDINGS:  Heart size is normal, as is the appearance of the pulmonary vascularity.  Lung zones appear clear, and are free of significant airspace consolidation or volume loss.  No pleural fluid.  No hilar or mediastinal mass lesion.  No pneumothorax.                                       I reviewed all labs, imaging, EKGs.     Plan   Nausea and Vomiting  -continue IV fluids and oral hydration    -antiemetic prn   -serial abdominal exams  -repeat CMP and VBG after third liter of fluids      Type 1 DM   -patient to continue insulin pump   -follow up endocrinology recommendations      I have discussed this case with Barbie Bowser PA-C.

## 2023-05-16 NOTE — ASSESSMENT & PLAN NOTE
BG goal: 140-180   T1DM.  On insulin pump at home.  Labs reviewed.  BG from cgm reviewed  Elevated ketones in urine and slightly elevated beta hydroxybutyrate  likely secondary to poor PO intake and vomiting.    Bicarb and anion gap normal.  Pt feeling much improved with fluids.    - -Continue Insulin pump   - POCT Glucose before meals and at bedtime  - Hypoglycemia protocol in place      ** Please notify Endocrine for any change and/or advance in diet**  ** Please call Endocrine for any BG related issues **     Discharge Planning:    She can return home on insulin pump

## 2023-05-16 NOTE — Clinical Note
Diagnosis: Hyperglycemia [159808]   Future Attending Provider: SONNY POTTER [[id number]]   Is the patient being sent to ED Observation?: Yes   Admitting Provider:: SONNY POTTER [[id number]]   Special Needs:: No Special Needs [1]

## 2023-05-16 NOTE — CONSULTS
[facility] - Emergency Dept  Endocrinology  Diabetes Consult Note    Consult Requested by: Twan Allen MD   Reason for admit: Hyperglycemia    HISTORY OF PRESENT ILLNESS:  Reason for Consult: Management of T1DM, Hyperglycemia     Home Diabetes Medications:  Omnipod insulin pump    How often checking glucose at home? >4 x day   BG readings on regimen: 100s  Hypoglycemia on the regimen?  No  Missed doses on regimen?  No    Diabetes Complications include:     Hyperglycemia    Complicating diabetes co morbidities:   None      HPI:   Patient is a 27 y.o. female with multiple episodes of vomiting and nausea since 3:00 a.m. this morning.  She is not sure if she has food poisoning.   Her glucose has been well controlled at home.  No abdominal pain.  Endocrine consulted for bg management.          Interval HPI:   In obs.  BG stable  Eating:  liquids  Nausea: No  Hypoglycemia and intervention: No  Fever: No  TPN and/or TF: No      PMH, PSH, FH, SH updated and reviewed     ROS:      Review of Systems   Constitutional:  Negative for unexpected weight change.   Eyes:  Negative for visual disturbance.   Respiratory:  Negative for cough.    Cardiovascular:  Negative for chest pain.   Gastrointestinal:  Negative for nausea and vomiting.   Endocrine: Negative for polydipsia and polyuria.   Musculoskeletal:  Negative for back pain.   Skin:  Negative for rash.   Neurological:  Negative for syncope.   Psychiatric/Behavioral:  Negative for agitation and dysphoric mood.      Current Medications and/or Treatments Impacting Glycemic Control  Immunotherapy:    Immunosuppressants       None          Steroids:   Hormones (From admission, onward)      None          Pressors:    Autonomic Drugs (From admission, onward)      None          Hyperglycemia/Diabetes Medications:   Antihyperglycemics (From admission, onward)      None             PHYSICAL EXAMINATION:  Vitals:    05/16/23 1505   BP: (!) 106/53   Pulse: 70   Resp: 17   Temp: 99.1 °F  (37.3 °C)     Body mass index is 22.24 kg/m².     Physical Exam  Constitutional:       General: She is not in acute distress.     Appearance: Normal appearance. She is not ill-appearing.   HENT:      Head: Normocephalic and atraumatic.      Right Ear: External ear normal.      Left Ear: External ear normal.      Nose: Nose normal.   Cardiovascular:      Rate and Rhythm: Normal rate and regular rhythm.      Heart sounds: No murmur heard.  Pulmonary:      Effort: Pulmonary effort is normal. No respiratory distress.   Abdominal:      General: There is no distension.   Musculoskeletal:         General: No swelling.      Right lower leg: No edema.      Left lower leg: No edema.   Skin:     Findings: No erythema.   Neurological:      General: No focal deficit present.      Mental Status: She is alert and oriented to person, place, and time.   Psychiatric:         Mood and Affect: Mood normal.         Behavior: Behavior normal.            Labs Reviewed and Include   Recent Labs   Lab 05/16/23  1002   *   CALCIUM 9.3   ALBUMIN 4.3   PROT 7.3      K 4.8   CO2 23      BUN 14   CREATININE 0.8   ALKPHOS 61   ALT 11   AST 16   BILITOT 0.8     Lab Results   Component Value Date    WBC 11.05 05/16/2023    HGB 15.0 05/16/2023    HCT 44.2 05/16/2023    MCV 98 05/16/2023     05/16/2023     No results for input(s): TSH, FREET4 in the last 168 hours.  Lab Results   Component Value Date    HGBA1C 6.6 (H) 04/24/2023       Nutritional status:   Body mass index is 22.24 kg/m².  Lab Results   Component Value Date    ALBUMIN 4.3 05/16/2023    ALBUMIN 4.0 10/28/2022    ALBUMIN 4.1 11/29/2021     No results found for: PREALBUMIN    Estimated Creatinine Clearance: 112.2 mL/min (based on SCr of 0.8 mg/dL).    Accu-Checks  Recent Labs     05/16/23  1007   POCTGLUCOSE 188*        ASSESSMENT and PLAN    Endocrine  Diabetes mellitus type I  BG goal: 140-180   T1DM.  On insulin pump at home.  Labs reviewed.  BG from cgm  reviewed  Elevated ketones in urine and slightly elevated beta hydroxybutyrate  likely secondary to poor PO intake and vomiting.    Bicarb and anion gap normal.  Pt feeling much improved with fluids.    - -Continue Insulin pump   - POCT Glucose before meals and at bedtime  - Hypoglycemia protocol in place      ** Please notify Endocrine for any change and/or advance in diet**  ** Please call Endocrine for any BG related issues **     Discharge Planning:    She can return home on insulin pump              Plan discussed with patient, family, and RN at bedside.     Carlito Truong PA-C  Endocrinology  [facility] - Emergency Dept

## 2023-05-16 NOTE — H&P
"[facility]  Emergency Medicine  History & Physical      Patient Name: Susu Su  MRN: [id number]  Admission Date: 2023  Attending Physician: Twan Allen MD   Primary Care Provider: Kamran Zhang MD         Patient information was obtained from patient and ER records.     Subjective:     Principal Problem:Hyperglycemia    Chief Complaint:   Chief Complaint   Patient presents with    Vomiting     Acute onset of emesis at 0300 this morning. "I think I have food poisoning." Took an 8mg zofran tablet PTA        HPI:   26 yo female presenting with vomiting.      PMH:  DM type I with insulin pump in place, HLD     Context: Patient reports multiple episodes of vomiting and nausea since 3:00 a.m. this morning.  She is not sure if she has food poisoning.  She did eat a veggie burger last night, but states no one else that ate with her has been ill.  Denies blood in the vomit.  She had 1 loose stool, but has not been having additional episodes of diarrhea.  No prior abdominal surgeries.  Her glucose has been well controlled at home.  No abdominal pain per se, feels crampy.   Onset:  Gradual  Duration:  Since 3:00 a.m.  Quality:  Cramping  Modifiers:  Tried taking an old Zofran, but believes the prescription may have been   Associated Symptoms:  No fevers, no URI symptoms    Past Medical History:   Diagnosis Date    Diabetes mellitus type I     Family history of high cholesterol     H/o Atorvastin 5mg     Type I (juvenile type) diabetes mellitus without mention of complication, uncontrolled 2012       Past Surgical History:   Procedure Laterality Date    WISDOM TOOTH EXTRACTION Bilateral 2014       Review of patient's allergies indicates:  No Known Allergies    No current facility-administered medications on file prior to encounter.     Current Outpatient Medications on File Prior to Encounter   Medication Sig    atorvastatin (LIPITOR) 10 MG tablet Take 1 tablet (10 mg total) " "by mouth once daily.    BD INSULIN SYRINGE ULT-FINE II 0.3 mL 31 x 5/16" Syrg USE AS DIRECTED 5 TIMES A DAY FOR INSULIN ADMINISTRATION    insulin aspart U-100 (NOVOLOG U-100 INSULIN ASPART) 100 unit/mL injection ACCORDING TO YOUR SLIDING SCALE    insulin lispro 100 unit/mL injection Inject into the skin 3 (three) times daily before meals.    insulin syringe-needle U-100 1/2 mL 30 x 5/16" Syrg For four to five times administration of insulin.    lancets (FREESTYLE LANCETS) Misc 6 Sticks by Misc.(Non-Drug; Combo Route) route once daily.    levonorgestreL (MIRENA) 20 mcg/24 hours (7 yrs) 52 mg IUD 1 each by Intrauterine route once.    spironolactone (ALDACTONE) 50 MG tablet     azelastine (ASTELIN) 137 mcg (0.1 %) nasal spray 1 spray (137 mcg total) by Nasal route 2 (two) times daily as needed for Rhinitis.    glucagon, human recombinant, (GLUCAGON) 1 mg Kit Inject 1 mL (1 mg total) into the skin as needed (for emergency hypoglycemic episode).    insulin glargine,hum.rec.anlog (LANTUS U-100 INSULIN SUBQ) Inject into the skin.     Family History       Problem Relation (Age of Onset)    Breast cancer Maternal Aunt (60)    Colon cancer Other    Diabetes Cousin    Heart disease Paternal Grandfather    Hyperlipidemia Mother    Other Father    Ovarian cancer Paternal Aunt    Karla Parkinson White syndrome Mother          Tobacco Use    Smoking status: Never    Smokeless tobacco: Never   Substance and Sexual Activity    Alcohol use: Yes     Alcohol/week: 4.0 standard drinks     Types: 4 Glasses of wine per week     Comment: occ    Drug use: No    Sexual activity: Yes     Partners: Male     Birth control/protection: I.U.D.     Comment: MIRENA age 22 ( 2018)     Review of Systems   Gastrointestinal:  Positive for abdominal pain, diarrhea, nausea and vomiting.   Objective:     Vital Signs (Most Recent):  Temp: 99.1 °F (37.3 °C) (05/16/23 1505)  Pulse: 70 (05/16/23 1505)  Resp: 17 (05/16/23 1505)  BP: (!) 106/53 (05/16/23 " 1505)  SpO2: 99 % (05/16/23 1505) Vital Signs (24h Range):  Temp:  [98.5 °F (36.9 °C)-99.4 °F (37.4 °C)] 99.1 °F (37.3 °C)  Pulse:  [70-77] 70  Resp:  [17-20] 17  SpO2:  [99 %-100 %] 99 %  BP: (103-114)/(53-70) 106/53     Weight: 70.3 kg (155 lb)  Body mass index is 22.24 kg/m².    Physical Exam  Vitals and nursing note reviewed.   Constitutional:       General: She is not in acute distress.     Appearance: Normal appearance. She is normal weight. She is not ill-appearing, toxic-appearing or diaphoretic.   HENT:      Head: Normocephalic and atraumatic.      Nose: Nose normal.      Mouth/Throat:      Mouth: Mucous membranes are dry.   Eyes:      Conjunctiva/sclera: Conjunctivae normal.      Pupils: Pupils are equal, round, and reactive to light.   Cardiovascular:      Rate and Rhythm: Normal rate.   Pulmonary:      Effort: Pulmonary effort is normal.   Abdominal:      General: Abdomen is flat. Bowel sounds are normal. There is no distension.      Palpations: Abdomen is soft.      Tenderness: There is no abdominal tenderness.   Musculoskeletal:         General: Normal range of motion.      Cervical back: Neck supple.   Skin:     General: Skin is warm and dry.      Capillary Refill: Capillary refill takes less than 2 seconds.   Neurological:      General: No focal deficit present.      Mental Status: She is alert and oriented to person, place, and time. Mental status is at baseline.   Psychiatric:         Mood and Affect: Mood normal.         Behavior: Behavior normal.         Thought Content: Thought content normal.         Judgment: Judgment normal.         CRANIAL NERVES     CN III, IV, VI   Pupils are equal, round, and reactive to light.    Significant Labs: All pertinent labs within the past 24 hours have been reviewed.    Significant Imaging: I have reviewed all pertinent imaging results/findings within the past 24 hours.    Assessment/Plan:     Nausea and Vomiting  -continue IV fluids and oral hydration     -antiemetic prn   -serial abdominal exams  -repeat CMP and VBG after third liter of fluids     Type 1 DM   -patient to continue insulin pump   -follow up endocrinology recommendations     VTE Risk Mitigation (From admission, onward)      None              Barbie Bowser PA-C  Department of Emergency Medicine   [facility]

## 2023-05-16 NOTE — ED PROVIDER NOTES
"Encounter Date: 2023       History     Chief Complaint   Patient presents with    Vomiting     Acute onset of emesis at 0300 this morning. "I think I have food poisoning." Took an 8mg zofran tablet PTA     28 yo female presenting with vomiting.     PMH:  DM type I with insulin pump in place, HLD    Context: Patient reports multiple episodes of vomiting and nausea since 3:00 a.m. this morning.  She is not sure if she has food poisoning.  She did eat a veggie burger prior to sx onset, but states no one else that ate with her has been ill.  Denies blood in the vomit.  She had 1 loose stool, but has not been having additional episodes of diarrhea.  No prior abdominal surgeries.  Her glucose has been well controlled at home, she monitors on her phone.  No abdominal pain per se, feels crampy.   Onset:  Gradual  Duration:  Since 3:00 a.m.  Quality:  Cramping  Modifiers:  Tried taking an old Zofran, but believes the prescription may have been   Associated Symptoms:  No fevers, no URI symptoms       The history is provided by the patient and medical records. No  was used.   Review of patient's allergies indicates:  No Known Allergies  Past Medical History:   Diagnosis Date    Diabetes mellitus type I     Family history of high cholesterol     H/o Atorvastin 5mg     Type I (juvenile type) diabetes mellitus without mention of complication, uncontrolled 2012     Past Surgical History:   Procedure Laterality Date    WISDOM TOOTH EXTRACTION Bilateral 2014     Family History   Problem Relation Age of Onset    Hyperlipidemia Mother     Karla Parkinson White syndrome Mother     Other Father         ablation    Heart disease Paternal Grandfather     Breast cancer Maternal Aunt 60    Ovarian cancer Paternal Aunt         ?    Diabetes Cousin     Colon cancer Other      Social History     Tobacco Use    Smoking status: Never    Smokeless tobacco: Never   Substance Use Topics    " Alcohol use: Yes     Alcohol/week: 4.0 standard drinks     Types: 4 Glasses of wine per week     Comment: occ    Drug use: No     Review of Systems   Constitutional:  Negative for fever.   Gastrointestinal:  Positive for abdominal pain, nausea and vomiting.     Physical Exam     Initial Vitals [05/16/23 0929]   BP Pulse Resp Temp SpO2   114/70 77 20 98.5 °F (36.9 °C) 100 %      MAP       --         Physical Exam    Nursing note and vitals reviewed.  Constitutional: She is not diaphoretic. No distress.   HENT:   Head: Normocephalic and atraumatic.   Tacky mucous membranes   Eyes: Right eye exhibits no discharge. Left eye exhibits no discharge.   Neck: Neck supple. No tracheal deviation present.   Cardiovascular:  Normal rate and regular rhythm.           Pulmonary/Chest: Breath sounds normal. No respiratory distress.   Abdominal: Abdomen is soft. Bowel sounds are normal. She exhibits no distension. There is no abdominal tenderness.   Insulin pump noted  There is no tenderness at McBurney's point and negative Mascorro's sign.   Musculoskeletal:      Cervical back: Neck supple.     Neurological: She is alert and oriented to person, place, and time.   Skin: Skin is warm. No rash noted.   Psychiatric: She has a normal mood and affect. Her behavior is normal.       ED Course   Procedures  Labs Reviewed   URINALYSIS, REFLEX TO URINE CULTURE - Abnormal; Notable for the following components:       Result Value    Appearance, UA Hazy (*)     Glucose, UA 2+ (*)     Ketones, UA 3+ (*)     Occult Blood UA 2+ (*)     Leukocytes, UA Trace (*)     All other components within normal limits    Narrative:     Specimen Source->Urine   CBC W/ AUTO DIFFERENTIAL - Abnormal; Notable for the following components:    MCH 33.3 (*)     Gran # (ANC) 10.2 (*)     Lymph # 0.2 (*)     Gran % 92.4 (*)     Lymph % 2.0 (*)     All other components within normal limits    Narrative:     Release to patient->Immediate   COMPREHENSIVE METABOLIC PANEL -  Abnormal; Notable for the following components:    Glucose 199 (*)     All other components within normal limits    Narrative:     Release to patient->Immediate   BETA - HYDROXYBUTYRATE, SERUM - Abnormal; Notable for the following components:    Beta-Hydroxybutyrate 0.9 (*)     All other components within normal limits    Narrative:     Release to patient->Immediate   URINALYSIS MICROSCOPIC - Abnormal; Notable for the following components:    RBC, UA 7 (*)     WBC, UA 9 (*)     Bacteria Few (*)     All other components within normal limits    Narrative:     Specimen Source->Urine   COMPREHENSIVE METABOLIC PANEL - Abnormal; Notable for the following components:    CO2 18 (*)     Glucose 181 (*)     Total Protein 5.4 (*)     Alkaline Phosphatase 41 (*)     ALT 8 (*)     All other components within normal limits   POCT GLUCOSE - Abnormal; Notable for the following components:    POCT Glucose 188 (*)     All other components within normal limits   ISTAT PROCEDURE - Abnormal; Notable for the following components:    POC PH 7.340 (*)     POC PCO2 45.6 (*)     POC PO2 16 (*)     POC SATURATED O2 18 (*)     All other components within normal limits   ISTAT PROCEDURE - Abnormal; Notable for the following components:    POC PH 7.306 (*)     POC PO2 33 (*)     POC HCO3 21.6 (*)     POC SATURATED O2 57 (*)     POC TCO2 23 (*)     All other components within normal limits   ISTAT PROCEDURE - Abnormal; Notable for the following components:    POC PO2 33 (*)     POC SATURATED O2 63 (*)     All other components within normal limits   HIV 1 / 2 ANTIBODY    Narrative:     Release to patient->Immediate   HEPATITIS C ANTIBODY    Narrative:     Release to patient->Immediate   LIPASE    Narrative:     Release to patient->Immediate   SARS-COV-2 RNA AMPLIFICATION, QUAL   POCT URINE PREGNANCY   POCT GLUCOSE MONITORING CONTINUOUS     EKG Readings: (Independently Interpreted)   Initial Reading: No STEMI. Rhythm: Normal Sinus Rhythm. Heart  Rate: 87. Clinical Impression: Normal Sinus Rhythm   ECG Results              EKG 12-lead (Final result)  Result time 05/16/23 11:46:51      Final result by Interface, Lab In [facility] (05/16/23 11:46:51)                   Narrative:    Test Reason : R73.9,    Vent. Rate : 087 BPM     Atrial Rate : 087 BPM     P-R Int : 116 ms          QRS Dur : 098 ms      QT Int : 390 ms       P-R-T Axes : 049 084 012 degrees     QTc Int : 469 ms    Normal sinus rhythm  Vertical axis  Normal ECG  When compared with ECG of 15-MAY-2008 16:24,  No significant change was found  Confirmed by Cliff MICHEL, Alexys BOLDEN ([id number]) on 5/16/2023 11:46:40 AM    Referred By: [email or web address]   SELF           Confirmed By:Alexys Coronado MD                                  Imaging Results              X-Ray Chest AP Portable (Final result)  Result time 05/16/23 10:46:18      Final result by Domingo Pineda MD (05/16/23 10:46:18)                   Impression:      No significant intrathoracic abnormality.  No significant detrimental interval change in the appearance of the chest since 07/06/2021 is appreciated, allowing for differences in projection.      Electronically signed by: Domingo Pineda MD  Date:    05/16/2023  Time:    10:46               Narrative:    EXAMINATION:  XR CHEST AP PORTABLE    CLINICAL HISTORY:  hyperglycemia;    TECHNIQUE:  One view    COMPARISON:  Comparison is made to 07/06/2021.    FINDINGS:  Heart size is normal, as is the appearance of the pulmonary vascularity.  Lung zones appear clear, and are free of significant airspace consolidation or volume loss.  No pleural fluid.  No hilar or mediastinal mass lesion.  No pneumothorax.                                       Medications   atorvastatin tablet 10 mg (has no administration in time range)   spironolactone tablet 50 mg (has no administration in time range)   ondansetron injection 4 mg (has no administration in time range)   ketorolac injection 9.999 mg (has no administration in time range)    prochlorperazine injection Soln 10 mg (has no administration in time range)   sodium chloride 0.9% bolus 1,000 mL 1,000 mL (0 mLs Intravenous Stopped 5/16/23 1133)   ondansetron injection 4 mg (4 mg Intravenous Given 5/16/23 1000)   sodium chloride 0.9% bolus 1,000 mL 1,000 mL (0 mLs Intravenous Stopped 5/16/23 1443)   ketorolac injection 9.999 mg (9.999 mg Intravenous Given 5/16/23 1254)   sodium chloride 0.9% bolus 1,000 mL 1,000 mL (0 mLs Intravenous Stopped 5/16/23 1824)     Medical Decision Making:   History:   Old Medical Records: I decided to obtain old medical records.  Initial Assessment:   Emergent evaluation of a 27-year-old female with a history of DM type 1, presenting with nausea and vomiting.  On arrival, she is afebrile, reassuring abdominal exam.  Plan for lab evaluation, symptom control, hydration, repeat abdominal exam.   Differential Diagnosis:   Including, but not limited to:    DKA  Gastroenteritis  Food-borne gastroenteritis  Pancreatitis  Independently Interpreted Test(s):   I have ordered and independently interpreted X-rays - see summary below.       <> Summary of X-Ray Reading(s): No significant abnormality   I have ordered and independently interpreted EKG Reading(s) - see prior notes  Clinical Tests:   Lab Tests: Reviewed and Ordered  Radiological Study: Ordered and Reviewed  Medical Tests: Reviewed and Ordered  ED Management:  Labs reviewed - no leukocytosis, + ketonuria, normal anion gap, pH 7.34, minimal elevation of BGB.  More likely dehydration in the setting of gastroenteritis, but given possible borderline/mild ketosis, I discussed the case with endocrinology, who will evaluate and provide recommendations.  On repeat abdominal exam, she remains non-tender, neg Mascorro's, neg McBurney's.  Considered risk/benefit of CT at this point and do not believe it is indicated.  She feels improved after zofran and is tolerating powerade.   Will place patient in ED Observation Unit for  further hydration.  We discussed return precautions for appendicitis at the bedside.     EDOU Plan:  -serial abdominal exams, consider CT A/P if fever, right lower quadrant pain   -follow-up endocrinology recommendations  -repeat labs after additional IV fluids  -consider admission to Hospital Medicine if worsening condition or recommended by endocrine service     Signed out to oncoming attending Dr. Benavides at 5p at the end of my shift if further assistance required.       ED Diagnoses:  Vomiting  Dehydration  DM type I   Other:   I have discussed this case with another health care provider.           ED Course as of 05/16/23 1922   Tue May 16, 2023   1013 POCT Glucose(!): 188 [AB]   1050 WBC: 11.05  No leukocytosis  [AB]   1123 Anion Gap: 9 [AB]   1123 CO2: 23 [AB]   1314 Squam Epithel, UA: 41  Likely contaminant  [AB]   1315 Ketonuria noted, BHB noted, normal bicarb, no AG elevation, pH 7.34 -- do not suspect DKA at this point.  [AB]      ED Course User Index  [AB] Twan Allen MD                 Clinical Impression:   Final diagnoses:  [R73.9] Hyperglycemia  [R11.2] Nausea and vomiting, unspecified vomiting type (Primary)        ED Disposition Condition    Observation Stable                    Twan Allen MD  05/16/23 2027

## 2023-05-17 NOTE — ED NOTES
Pt reports feeling better and ready to be discharged, juice and ice brought to patient. MD notified, will continue to monitor.

## 2023-05-17 NOTE — DISCHARGE INSTRUCTIONS
Take the prescribed Zofran as needed for nausea and vomiting.   Take the prescribed Bentyl as needed for abdominal pain.   Stick to a clear liquid diet over the next 24 hours.   Follow-up with your endocrinology team.     Return to the emergency room for new, worsening, or concerning symptoms.     Future Appointments   Date Time Provider Department Center   10/2/2023  1:30 PM Alona Mcghee MD [facility]

## 2023-05-17 NOTE — DISCHARGE SUMMARY
ED Observation Unit  Discharge Summary        History of Present Illness:    Susu Su is a 27 y.o. female with medical history of T1DM with insulin pump, HLD presenting to the ED with the chief complaint of vomiting. Placed in EDOU for intractable vomiting and Endocrinology consult regarding insulin pump management.     Patient reports multiple episodes of vomiting and nausea since 3:00 a.m. this morning.  She is not sure if she has food poisoning.  She did eat a veggie burger last night, but states no one else that ate with her has been ill.  Denies blood in the vomit.  She had 1 loose stool, but has not been having additional episodes of diarrhea.  No prior abdominal surgeries.  Her glucose has been well controlled at home.  No abdominal pain per se, feels crampy.   Onset:  Gradual  Duration:  Since 3:00 a.m.  Quality:  Cramping  Modifiers:  Tried taking an old Zofran, but believes the prescription may have been   Associated Symptoms:  No fevers, no URI symptoms    Observation Course:    Nausea and vomiting resolved. Serial abdominal exams benign. Tolerating PO fluids without difficulty. VBG pH normalized 7.385. Bicarb on CMP trend 18, but normal 24.9 on VBG. No AG. Endocrinology consulted and evaluated at bedside. Okay for continuing pump settings and stable for outpatient management. Discharged with RX for Zofran and Bentyl. Return precautions given. Patient and her mother agreeable to the plan.     Consultants:    Endocrinology    Final Diagnosis:  Nausea and Vomiting  Hyperglycemia in Type 1 DM    Discharge Condition: Good    Disposition: Home or Self Care     Time spent on the discharge of the patient including review of hospital course with the patient. reviewing discharge medications and arranging follow-up care 35 minutes.  Patient was seen and examined on the date of discharge and determined to be suitable for discharge.    Follow Up:  Future Appointments   Date Time Provider  Department Center   10/2/2023  1:30 PM Alona Mcghee MD [facility]     Attending Note:  Agree with above.  I was immediately available until the end of my shift at 5p, at which time main ED attending was present if needed.

## 2023-05-24 ENCOUNTER — TELEPHONE (OUTPATIENT)
Dept: ENDOCRINOLOGY | Facility: CLINIC | Age: 28
End: 2023-05-24
Payer: COMMERCIAL

## 2023-05-24 NOTE — TELEPHONE ENCOUNTER
----- Message from Nadia Guzman sent at 5/24/2023  4:28 PM CDT -----  Contact: [phone]  Susu Su calling regarding Patient Advice (message) for #want to know why she need an appt in pump clinic or why it's call pump clinic

## 2023-05-24 NOTE — TELEPHONE ENCOUNTER
----- Message from Norma Miller CMA sent at 5/24/2023  2:41 PM CDT -----  Regarding: Please call  Contact: Patient  Hi,    Pt called to verify appt and clarify if it will be at the pump clinic. Please call    Thank you

## 2023-05-24 NOTE — TELEPHONE ENCOUNTER
Spoke with patient. Patient extremely upset because she felt that it wasn't explained to her why she was scheduled in this particular clinic. Pt states that she doesn't need or want help with her insulin pump because she already knows how to use her insulin pump. Explained to pt that the visit isn't to teach her how to use her insulin pump, that it is simply because she is wearing an insulin pump and that if she were not on an insulin pump then she would have a visit in the regular clinic. 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 Explained again to pt that appt in pump clinic does not necessarily mean she will get teaching but that because she is wearing insulin pump, that is where she and anyone else who is wearing an insulin pump, no matter the circumstances, would be scheduled. Pt verbalized understanding.

## 2023-06-13 ENCOUNTER — PATIENT OUTREACH (OUTPATIENT)
Dept: ADMINISTRATIVE | Facility: HOSPITAL | Age: 28
End: 2023-06-13
Payer: COMMERCIAL

## 2023-07-19 ENCOUNTER — TELEPHONE (OUTPATIENT)
Dept: OBSTETRICS AND GYNECOLOGY | Facility: CLINIC | Age: 28
End: 2023-07-19
Payer: COMMERCIAL

## 2023-07-19 ENCOUNTER — PATIENT MESSAGE (OUTPATIENT)
Dept: OBSTETRICS AND GYNECOLOGY | Facility: CLINIC | Age: 28
End: 2023-07-19
Payer: COMMERCIAL

## 2023-07-19 DIAGNOSIS — N92.6 IRREGULAR MENSES: Primary | ICD-10-CM

## 2023-07-19 NOTE — TELEPHONE ENCOUNTER
Pt sent message stating I know you told me bleeding more regularly would likely happen towards the end of my mirena, but yesterday and today Ive basically had a period w normal bleeding.Is that normal?  Advised pt per Dr Mcghee this can be normal but would like to get pelvic ultrasound to check the lining of the uterus. Orders placed

## 2023-09-07 ENCOUNTER — OFFICE VISIT (OUTPATIENT)
Dept: ENDOCRINOLOGY | Facility: CLINIC | Age: 28
End: 2023-09-07
Payer: COMMERCIAL

## 2023-09-07 VITALS
HEIGHT: 70 IN | WEIGHT: 162.25 LBS | SYSTOLIC BLOOD PRESSURE: 112 MMHG | DIASTOLIC BLOOD PRESSURE: 74 MMHG | BODY MASS INDEX: 23.23 KG/M2 | OXYGEN SATURATION: 99 % | HEART RATE: 58 BPM

## 2023-09-07 DIAGNOSIS — E10.9 TYPE 1 DIABETES MELLITUS WITHOUT COMPLICATION: Primary | ICD-10-CM

## 2023-09-07 DIAGNOSIS — E78.00 PURE HYPERCHOLESTEROLEMIA: ICD-10-CM

## 2023-09-07 PROCEDURE — 95251 PR GLUCOSE MONITOR, 72 HOUR, PHYS INTERP: ICD-10-PCS | Mod: S$GLB,,, | Performed by: STUDENT IN AN ORGANIZED HEALTH CARE EDUCATION/TRAINING PROGRAM

## 2023-09-07 PROCEDURE — 3078F PR MOST RECENT DIASTOLIC BLOOD PRESSURE < 80 MM HG: ICD-10-PCS | Mod: CPTII,S$GLB,, | Performed by: INTERNAL MEDICINE

## 2023-09-07 PROCEDURE — 1159F MED LIST DOCD IN RCRD: CPT | Mod: CPTII,S$GLB,, | Performed by: INTERNAL MEDICINE

## 2023-09-07 PROCEDURE — 3074F PR MOST RECENT SYSTOLIC BLOOD PRESSURE < 130 MM HG: ICD-10-PCS | Mod: CPTII,S$GLB,, | Performed by: INTERNAL MEDICINE

## 2023-09-07 PROCEDURE — 99999 PR PBB SHADOW E&M-EST. PATIENT-LVL IV: CPT | Mod: PBBFAC,,,

## 2023-09-07 PROCEDURE — 3008F PR BODY MASS INDEX (BMI) DOCUMENTED: ICD-10-PCS | Mod: CPTII,S$GLB,, | Performed by: INTERNAL MEDICINE

## 2023-09-07 PROCEDURE — 3044F HG A1C LEVEL LT 7.0%: CPT | Mod: CPTII,S$GLB,, | Performed by: INTERNAL MEDICINE

## 2023-09-07 PROCEDURE — 3074F SYST BP LT 130 MM HG: CPT | Mod: CPTII,S$GLB,, | Performed by: INTERNAL MEDICINE

## 2023-09-07 PROCEDURE — 1160F RVW MEDS BY RX/DR IN RCRD: CPT | Mod: CPTII,S$GLB,, | Performed by: INTERNAL MEDICINE

## 2023-09-07 PROCEDURE — 99214 PR OFFICE/OUTPT VISIT, EST, LEVL IV, 30-39 MIN: ICD-10-PCS | Mod: 25,S$GLB,, | Performed by: INTERNAL MEDICINE

## 2023-09-07 PROCEDURE — 99999 PR PBB SHADOW E&M-EST. PATIENT-LVL IV: ICD-10-PCS | Mod: PBBFAC,,,

## 2023-09-07 PROCEDURE — 1159F PR MEDICATION LIST DOCUMENTED IN MEDICAL RECORD: ICD-10-PCS | Mod: CPTII,S$GLB,, | Performed by: INTERNAL MEDICINE

## 2023-09-07 PROCEDURE — 95251 CONT GLUC MNTR ANALYSIS I&R: CPT | Mod: S$GLB,,, | Performed by: STUDENT IN AN ORGANIZED HEALTH CARE EDUCATION/TRAINING PROGRAM

## 2023-09-07 PROCEDURE — 3044F PR MOST RECENT HEMOGLOBIN A1C LEVEL <7.0%: ICD-10-PCS | Mod: CPTII,S$GLB,, | Performed by: INTERNAL MEDICINE

## 2023-09-07 PROCEDURE — 3078F DIAST BP <80 MM HG: CPT | Mod: CPTII,S$GLB,, | Performed by: INTERNAL MEDICINE

## 2023-09-07 PROCEDURE — 99214 OFFICE O/P EST MOD 30 MIN: CPT | Mod: 25,S$GLB,, | Performed by: INTERNAL MEDICINE

## 2023-09-07 PROCEDURE — 1160F PR REVIEW ALL MEDS BY PRESCRIBER/CLIN PHARMACIST DOCUMENTED: ICD-10-PCS | Mod: CPTII,S$GLB,, | Performed by: INTERNAL MEDICINE

## 2023-09-07 PROCEDURE — 3008F BODY MASS INDEX DOCD: CPT | Mod: CPTII,S$GLB,, | Performed by: INTERNAL MEDICINE

## 2023-09-07 RX ORDER — INSULIN GLARGINE 300 U/ML
INJECTION, SOLUTION SUBCUTANEOUS
Qty: 3 ML | Refills: 6 | Status: SHIPPED | OUTPATIENT
Start: 2023-09-07

## 2023-09-07 RX ORDER — BLOOD SUGAR DIAGNOSTIC
STRIP MISCELLANEOUS
COMMUNITY
Start: 2023-08-11

## 2023-09-07 RX ORDER — GLUCAGON 3 MG/1
POWDER NASAL
Qty: 2 EACH | Refills: 6 | Status: SHIPPED | OUTPATIENT
Start: 2023-09-07

## 2023-09-07 RX ORDER — PEN NEEDLE, DIABETIC 30 GX3/16"
1 NEEDLE, DISPOSABLE MISCELLANEOUS DAILY
Qty: 50 EACH | Refills: 6 | Status: SHIPPED | OUTPATIENT
Start: 2023-09-07

## 2023-09-07 RX ORDER — GLUCAGON 3 MG/1
POWDER NASAL
COMMUNITY
Start: 2023-03-21 | End: 2023-09-07 | Stop reason: SDUPTHER

## 2023-09-07 RX ORDER — INSULIN PUMP CONTROLLER
EACH MISCELLANEOUS
COMMUNITY
Start: 2023-08-31

## 2023-09-07 RX ORDER — BLOOD-GLUCOSE SENSOR
EACH MISCELLANEOUS
COMMUNITY
Start: 2023-07-20

## 2023-09-07 NOTE — PROGRESS NOTES
Pump Clinic Return Visit    Susu Su is a 28 y.o.female presenting for follow-up of type 1 DM    Pt was seen by our inpatient glycemic CHANNING team in 2023 when she p/w N/V. Thought to be mild DKA initially but more c/w starvation ketosis in the setting of food poisoning rather than DKA  Originally from [place]. Lived in [facility]) for 10yrs, recently moved back home for grad school and then got a job and stayed. Still sees her endocrinologist in [place] regularly and plans to follow with both since she is often back and forth.    Diagnosed at age 15mo    Was using medtronic pump, hated it. Began looping 5 or so years ago -- started when she was with her endo in [place]. Has done very well on this system    Current diabetes regimen:  Pump: Omnipod Dash with Novolog insulin with loop channing for closed loop w/ dexcom G7     Pump Settings  Basal Rate  12A: 0.75  3A: 0.7  5:30A: 0.7  12P: 0.75    Carb Ratio  12A: 8  12P: 7  3:30P: 8.5  6:30P: 7    ISF  12A: 50  3A: 55  6A: 50  12P: 45    Target: 110-130      Lab Results   Component Value Date    HGBA1C 6.6 (H) 2023       Glucagon: has baqsimi - thinks , will resend  Back-up basal insulin: needs refill     Glucose Monitoring:  Meter/CGM:Dexcom G7 report reviewed and uploaded under media.      Hypoglycemic Episodes:  Not often, knows how to correct with 15g. She is symptomatic when low.    DKA: once as a young child, has been borderline in early adulthood  Recent ED visit with mildly elevated BHB 2/2 n/v      Diet/Exercise:  Eats 3-4 meals, balanced/generally healthy   Exercises 6-7 days per week, runs, swims, yoga, weights - BG tends to spike right after she exercises     Pregnancy plans: no plan for pregnancy in near future, has IUD      Screening / DM Complications:  Neuropathy: no  Last foot exam : 10/21/2022 - will do next time  Last eye exam : 2023;  mild NPDR,  follows with opthal (last note in media)  CVD/MI: no  Nephropathy: no  Lab  "Results   Component Value Date    MICALBCREAT Unable to calculate 10/28/2022         Lab Results   Component Value Date    TSH 2.258 10/28/2022     No results found for: "TTGIGA"      Lipids: on atorvastatin 10 mg -- genetic predisposition to HLD - mother is marathon runner with high cholesterol   Lab Results   Component Value Date    CHOL 159 10/28/2022    TRIG 69 10/28/2022    HDL 63 10/28/2022    LDLCALC 82.2 10/28/2022    CHOLHDL 39.6 10/28/2022         Current Outpatient Medications:     atorvastatin (LIPITOR) 10 MG tablet, Take 1 tablet (10 mg total) by mouth once daily., Disp: 90 tablet, Rfl: 1    azelastine (ASTELIN) 137 mcg (0.1 %) nasal spray, 1 spray (137 mcg total) by Nasal route 2 (two) times daily as needed for Rhinitis., Disp: 30 mL, Rfl: 0    BD INSULIN SYRINGE ULT-FINE II 0.3 mL 31 x 5/16" Syrg, USE AS DIRECTED 5 TIMES A DAY FOR INSULIN ADMINISTRATION, Disp: 500 each, Rfl: 1    glucagon, human recombinant, (GLUCAGON) 1 mg Kit, Inject 1 mL (1 mg total) into the skin as needed (for emergency hypoglycemic episode)., Disp: 3 kit, Rfl: 1    insulin aspart U-100 (NOVOLOG U-100 INSULIN ASPART) 100 unit/mL injection, ACCORDING TO YOUR SLIDING SCALE, Disp: 10 mL, Rfl: 4    insulin glargine,hum.rec.anlog (LANTUS U-100 INSULIN SUBQ), Inject into the skin., Disp: , Rfl:     insulin lispro 100 unit/mL injection, Inject into the skin 3 (three) times daily before meals., Disp: , Rfl:     insulin syringe-needle U-100 1/2 mL 30 x 5/16" Syrg, For four to five times administration of insulin., Disp: 300 each, Rfl: 4    lancets (FREESTYLE LANCETS) Misc, 6 Sticks by Misc.(Non-Drug; Combo Route) route once daily., Disp: 700 each, Rfl: 3    levonorgestreL (MIRENA) 20 mcg/24 hours (7 yrs) 52 mg IUD, 1 each by Intrauterine route once., Disp: , Rfl:     ondansetron (ZOFRAN-ODT) 4 MG TbDL, Take 1 tablet (4 mg total) by mouth every 6 (six) hours as needed., Disp: 15 tablet, Rfl: 0    spironolactone (ALDACTONE) 50 MG tablet, , " Disp: , Rfl:     ROS as above    Objective:     Vitals:    09/07/23 1351   BP: 112/74   Pulse: (!) 58     Wt Readings from Last 3 Encounters:   09/07/23 73.6 kg (162 lb 4.1 oz)   05/16/23 70.3 kg (155 lb)   12/05/22 73.9 kg (163 lb)     Body mass index is 23.28 kg/m².  Physical Exam  Vitals reviewed.   Constitutional:       General: She is not in acute distress.     Appearance: Normal appearance. She is normal weight. She is not ill-appearing.   HENT:      Head: Normocephalic.      Mouth/Throat:      Mouth: Mucous membranes are moist.   Eyes:      Conjunctiva/sclera: Conjunctivae normal.   Pulmonary:      Effort: Pulmonary effort is normal.   Musculoskeletal:      Cervical back: Normal range of motion and neck supple.      Right lower leg: No edema.      Left lower leg: No edema.   Neurological:      Mental Status: She is alert and oriented to person, place, and time.   Psychiatric:         Mood and Affect: Mood normal.         Behavior: Behavior normal.       LABS    Chemistry        Component Value Date/Time     05/16/2023 1803    K 4.1 05/16/2023 1803     05/16/2023 1803    CO2 18 (L) 05/16/2023 1803    BUN 12 05/16/2023 1803    CREATININE 0.8 05/16/2023 1803     (H) 05/16/2023 1803        Component Value Date/Time    CALCIUM 7.8 (L) 05/16/2023 1803    ALKPHOS 41 (L) 05/16/2023 1803    AST 10 05/16/2023 1803    ALT 8 (L) 05/16/2023 1803    BILITOT 0.5 05/16/2023 1803    ESTGFRAFRICA >60.0 11/29/2021 0806    EGFRNONAA >60.0 11/29/2021 0806        Assessment and Plan     Problem List Items Addressed This Visit          Cardiac/Vascular    HLD (hyperlipidemia)     Continue atorvastatin 10mg   Repeat fasting lipid panel with next labs   Goal LDL<70            Endocrine    Diabetes mellitus type I - Primary     T1D on CLS with Omnipod Dash and Dexcom G7 via loop channing  Doing well on this overall but will work on timing of boluses - often boluses right what she starts eating   We discussed if highs  when timed appropriately may need stronger ICR  She plans to continue to follow with her endocrinologist in [place] as well as with us   Will [facility] for her next f/u with her [place] endo  F/u with us in 1yr (or sooner prn)  Refilled baqsimi, backup basal insulin (Toujeo) and pen needles          Relevant Medications    insulin glargine, TOUJEO, (TOUJEO SOLOSTAR U-300 INSULIN) 300 unit/mL (1.5 mL) InPn pen    Other Relevant Orders    Lipid Panel    TSH    Microalbumin/Creatinine Ratio, Urine    Comprehensive Metabolic Panel    T4, Free    CBC Auto Differential    Lipid Panel    Hemoglobin A1C        Pump backup plan    If the insulin pump is non functional and discontinued for anticipated more than 20 hours, please give daily injections of:  Long acting insulin: Toujeo 20 units daily  Short acting insulin:  Novolog with carb ratio and correction per pump settings.    When the insulin pump is restarted, do not restart basal rates until at least 22 hours after the last long acting insulin injection. You can set a 0% temporary basal setting that will last until this time and use your pump to bolus for meals and correction.    For any technical insulin pump issues, please contact the insulin pump company; the toll free number is printed on the label on the back of the insulin pump.      Kim Urena MD  [facility], 6th Floor  [address] [place], [place], [address]    Office: [phone]  Fax: [phone]

## 2023-09-07 NOTE — PATIENT INSTRUCTIONS
Work on timing of boluses    I will sent prescriptions for baqsimi and back up basal insulin (Toujeo) with pen needles    F/u in 1yr. Our scheduled fill up quickly. Our schedules open 4mo in advance - message us 4mo before you would like a f/u appt      All [facility] orders [facility] to [facility]

## 2023-09-08 ENCOUNTER — PATIENT MESSAGE (OUTPATIENT)
Dept: ENDOCRINOLOGY | Facility: CLINIC | Age: 28
End: 2023-09-08
Payer: COMMERCIAL

## 2023-09-08 NOTE — ASSESSMENT & PLAN NOTE
T1D on CLS with Omnipod Dash and Dexcom G7 via loop channing  Doing well on this overall but will work on timing of boluses - often boluses right what she starts eating   We discussed if highs when timed appropriately may need stronger ICR  She plans to continue to follow with her endocrinologist in [place] as well as with us   Will [facility] to [facility] for her next f/u with her [place] endo  F/u with us in 1yr (or sooner prn)  Refilled baqsimi, backup basal insulin (Toujeo) and pen needles

## 2023-09-11 NOTE — PROGRESS NOTES
I have reviewed and concur with Dr. Urena's history, physical, assessment, and plan.  I have personally interviewed and examined the patient.    Using [employer] looping system, report not available at time of visit but she will send. Dexcom reviewed and some highs following meals but otherwise control is near goal  Will work on timing of bolus and review pump download when she sends    Jessie Valle MD

## 2023-09-22 RX ORDER — ATORVASTATIN CALCIUM 10 MG/1
10 TABLET, FILM COATED ORAL
Qty: 90 TABLET | Refills: 1 | Status: SHIPPED | OUTPATIENT
Start: 2023-09-22 | End: 2023-11-06 | Stop reason: SDUPTHER

## 2023-09-22 NOTE — TELEPHONE ENCOUNTER
Care Due:                  Date            Visit Type   Department     Provider  --------------------------------------------------------------------------------                                NP -                              PRIMARY      [facility] INTERNAL  Last Visit: 10-      CARE ([facility])   MEDICINE       Kamran Zhang                              MYCHART                              ANNUAL                              CHECKUP/PHY  [facility] INTERNAL  Next Visit: 10-      S            COURTNEY Zhang                                                            Last  Test          Frequency    Reason                     Performed    Due Date  --------------------------------------------------------------------------------    HBA1C.......  6 months...  insulin..................  04-   10-    Lipid Panel.  12 months..  atorvastatin.............  10-   10-    [employer] Embedded Care Due Messages. Reference number: [id number].   9/22/2023 12:23:32 AM CDT

## 2023-10-02 ENCOUNTER — TELEPHONE (OUTPATIENT)
Dept: OBSTETRICS AND GYNECOLOGY | Facility: CLINIC | Age: 28
End: 2023-10-02

## 2023-10-02 ENCOUNTER — OFFICE VISIT (OUTPATIENT)
Dept: OBSTETRICS AND GYNECOLOGY | Facility: CLINIC | Age: 28
End: 2023-10-02
Attending: OBSTETRICS & GYNECOLOGY
Payer: COMMERCIAL

## 2023-10-02 VITALS
BODY MASS INDEX: 23.48 KG/M2 | DIASTOLIC BLOOD PRESSURE: 74 MMHG | SYSTOLIC BLOOD PRESSURE: 139 MMHG | HEART RATE: 67 BPM | WEIGHT: 164 LBS | HEIGHT: 70 IN

## 2023-10-02 DIAGNOSIS — Z01.419 ENCOUNTER FOR GYNECOLOGICAL EXAMINATION WITHOUT ABNORMAL FINDING: ICD-10-CM

## 2023-10-02 DIAGNOSIS — N63.21 MASS OF UPPER OUTER QUADRANT OF LEFT BREAST: ICD-10-CM

## 2023-10-02 DIAGNOSIS — Z12.4 SCREENING FOR MALIGNANT NEOPLASM OF THE CERVIX: Primary | ICD-10-CM

## 2023-10-02 PROCEDURE — 3008F PR BODY MASS INDEX (BMI) DOCUMENTED: ICD-10-PCS | Mod: CPTII,S$GLB,, | Performed by: OBSTETRICS & GYNECOLOGY

## 2023-10-02 PROCEDURE — 3078F DIAST BP <80 MM HG: CPT | Mod: CPTII,S$GLB,, | Performed by: OBSTETRICS & GYNECOLOGY

## 2023-10-02 PROCEDURE — 1159F PR MEDICATION LIST DOCUMENTED IN MEDICAL RECORD: ICD-10-PCS | Mod: CPTII,S$GLB,, | Performed by: OBSTETRICS & GYNECOLOGY

## 2023-10-02 PROCEDURE — 3075F SYST BP GE 130 - 139MM HG: CPT | Mod: CPTII,S$GLB,, | Performed by: OBSTETRICS & GYNECOLOGY

## 2023-10-02 PROCEDURE — 99999 PR PBB SHADOW E&M-EST. PATIENT-LVL IV: CPT | Mod: PBBFAC,,, | Performed by: OBSTETRICS & GYNECOLOGY

## 2023-10-02 PROCEDURE — 3044F HG A1C LEVEL LT 7.0%: CPT | Mod: CPTII,S$GLB,, | Performed by: OBSTETRICS & GYNECOLOGY

## 2023-10-02 PROCEDURE — 3008F BODY MASS INDEX DOCD: CPT | Mod: CPTII,S$GLB,, | Performed by: OBSTETRICS & GYNECOLOGY

## 2023-10-02 PROCEDURE — 99395 PR PREVENTIVE VISIT,EST,18-39: ICD-10-PCS | Mod: S$GLB,,, | Performed by: OBSTETRICS & GYNECOLOGY

## 2023-10-02 PROCEDURE — 3075F PR MOST RECENT SYSTOLIC BLOOD PRESS GE 130-139MM HG: ICD-10-PCS | Mod: CPTII,S$GLB,, | Performed by: OBSTETRICS & GYNECOLOGY

## 2023-10-02 PROCEDURE — 3044F PR MOST RECENT HEMOGLOBIN A1C LEVEL <7.0%: ICD-10-PCS | Mod: CPTII,S$GLB,, | Performed by: OBSTETRICS & GYNECOLOGY

## 2023-10-02 PROCEDURE — 3078F PR MOST RECENT DIASTOLIC BLOOD PRESSURE < 80 MM HG: ICD-10-PCS | Mod: CPTII,S$GLB,, | Performed by: OBSTETRICS & GYNECOLOGY

## 2023-10-02 PROCEDURE — 87624 HPV HI-RISK TYP POOLED RSLT: CPT | Performed by: OBSTETRICS & GYNECOLOGY

## 2023-10-02 PROCEDURE — 1159F MED LIST DOCD IN RCRD: CPT | Mod: CPTII,S$GLB,, | Performed by: OBSTETRICS & GYNECOLOGY

## 2023-10-02 PROCEDURE — 99395 PREV VISIT EST AGE 18-39: CPT | Mod: S$GLB,,, | Performed by: OBSTETRICS & GYNECOLOGY

## 2023-10-02 PROCEDURE — 99999 PR PBB SHADOW E&M-EST. PATIENT-LVL IV: ICD-10-PCS | Mod: PBBFAC,,, | Performed by: OBSTETRICS & GYNECOLOGY

## 2023-10-02 PROCEDURE — 88175 CYTOPATH C/V AUTO FLUID REDO: CPT | Performed by: OBSTETRICS & GYNECOLOGY

## 2023-10-02 RX ORDER — IVERMECTIN 10 MG/G
CREAM TOPICAL
COMMUNITY
Start: 2023-09-22

## 2023-10-02 RX ORDER — ONDANSETRON 8 MG/1
8 TABLET, ORALLY DISINTEGRATING ORAL EVERY 8 HOURS PRN
COMMUNITY
Start: 2023-06-27

## 2023-10-02 RX ORDER — BLOOD-GLUCOSE,RECEIVER,CONT
EACH MISCELLANEOUS
COMMUNITY
Start: 2023-07-20

## 2023-10-02 RX ORDER — SPIRONOLACTONE 100 MG/1
100 TABLET, FILM COATED ORAL
COMMUNITY
Start: 2023-09-20

## 2023-10-02 NOTE — TELEPHONE ENCOUNTER
----- Message from Yoli Bonilla sent at 10/2/2023  3:04 PM CDT -----  Regarding: sooner appt  Contact: pt @[phone]  Pt is calling to speak to someone in the office; asking for a sooner appt than what they are scheduled for. Pt is already on the wait list; no available appts in Epic that are coming up soon. Pt is asking to speak to someone in the office. Please call to advise. Thanks.         Reason for sooner appt: pt would like an appt for this week         Pt's DX: [facility]         When is the first available appointment? 10-         Communication Preference:[phone]         Additional Information:   n/a

## 2023-10-02 NOTE — PROGRESS NOTES
"SUBJECTIVE:   28 y.o. female   for annual routine Pap and checkup. Patient's last menstrual period was 2023..  She has no unusual complaints.    Her mother had recent bilateral mastectomy for increased risk of breast cancer- her mother's sisters had breast cancer. Mom's pathology was negative.     Past Medical History:   Diagnosis Date    Diabetes mellitus type I     Family history of high cholesterol     H/o Atorvastin 5mg     Type I (juvenile type) diabetes mellitus without mention of complication, uncontrolled 2012     Past Surgical History:   Procedure Laterality Date    WISDOM TOOTH EXTRACTION Bilateral 2014     Social History     Socioeconomic History    Marital status: Single   Tobacco Use    Smoking status: Never     Passive exposure: Never    Smokeless tobacco: Never   Substance and Sexual Activity    Alcohol use: Yes     Alcohol/week: 4.0 standard drinks of alcohol     Types: 4 Glasses of wine per week     Comment: occ    Drug use: No    Sexual activity: Yes     Partners: Male     Birth control/protection: I.U.D.     Comment: MIRENA age 22 ( 2018)     Family History   Problem Relation Age of Onset    Heart disease Paternal Grandfather 60    Other Father         ablation    Hyperlipidemia Mother     Karla Parkinson White syndrome Mother     Breast cancer Maternal Aunt 60    Ovarian cancer Paternal Aunt         ?    Diabetes Cousin     Colon cancer Other     Cancer Neg Hx     Uterine cancer Neg Hx     Cervical cancer Neg Hx      OB History    Para Term  AB Living   0             SAB IAB Ectopic Multiple Live Births                       Current Outpatient Medications   Medication Sig Dispense Refill    atorvastatin (LIPITOR) 10 MG tablet TAKE 1 TABLET BY MOUTH EVERY DAY 90 tablet 1    BAQSIMI 3 mg/actuation Spry 3 MG NASAL AS DIRECTED IN ONE NOSTRIL DOSE DOES NOT NEED TO BE INHALED 2 each 6    BD INSULIN SYRINGE ULT-FINE II 0.3 mL 31 x 5/16" Syrg USE AS DIRECTED 5 TIMES A " "DAY FOR INSULIN ADMINISTRATION (Patient not taking: Reported on 9/7/2023) 500 each 1    CONTOUR NEXT TEST STRIPS Strp SMARTSIG:Via Meter 10 Times Daily PRN      DEXCOM G7  Misc       DEXCOM G7 SENSOR Marti CBG: CHANGE EVERY 10 DAYS      glucagon, human recombinant, (GLUCAGON) 1 mg Kit Inject 1 mL (1 mg total) into the skin as needed (for emergency hypoglycemic episode). (Patient not taking: Reported on 9/7/2023) 3 kit 1    insulin aspart U-100 (NOVOLOG U-100 INSULIN ASPART) 100 unit/mL injection ACCORDING TO YOUR SLIDING SCALE (Patient taking differently: Up to 50 units daily via insulin pump) 10 mL 4    insulin glargine, TOUJEO, (TOUJEO SOLOSTAR U-300 INSULIN) 300 unit/mL (1.5 mL) InPn pen Inject 20u daily in the event of insulin pump failure 3 mL 6    insulin glargine,hum.rec.anlog (LANTUS U-100 INSULIN SUBQ) Inject into the skin.      insulin lispro 100 unit/mL injection Inject into the skin 3 (three) times daily before meals.      insulin syringe-needle U-100 1/2 mL 30 x 5/16" Syrg For four to five times administration of insulin. (Patient not taking: Reported on 9/7/2023) 300 each 4    lancets (FREESTYLE LANCETS) Misc 6 Sticks by Misc.(Non-Drug; Combo Route) route once daily. 700 each 3    levonorgestreL (MIRENA) 20 mcg/24 hours (7 yrs) 52 mg IUD 1 each by Intrauterine route once.      OMNIPOD DASH PODS, GEN 4, Crtg Inject into the skin.      ondansetron (ZOFRAN-ODT) 8 MG TbDL Take 8 mg by mouth every 8 (eight) hours as needed.      pen needle, diabetic (BD ULTRA-FINE HUSSAIN PEN NEEDLE) 32 gauge x 5/32" Ndle 1 each by Misc.(Non-Drug; Combo Route) route once daily. 50 each 6    SOOLANTRA 1 % Crea SMARTSIG:sparingly Topical Daily      spironolactone (ALDACTONE) 100 MG tablet Take 100 mg by mouth.       No current facility-administered medications for this visit.     Allergies: Patient has no known allergies.     The ASCVD Risk score (Somerset DK, et al., 2019) failed to calculate for the following reasons:    " The 2019 ASCVD risk score is only valid for ages 40 to 79      ROS:  Constitutional: no weight loss, weight gain, fever, fatigue  Eyes:  No vision changes, glasses/contacts  ENT/Mouth: No ulcers, sinus problems, ears ringing, headache  Cardiovascular: No inability to lie flat, chest pain, exercise intolerance, swelling, heart palpitations  Respiratory: No wheezing, coughing blood, shortness of breath, or cough  Gastrointestinal: No diarrhea, bloody stool, nausea/vomiting, constipation, gas, hemorrhoids  Genitourinary: No blood in urine, painful urination, urgency of urination, frequency of urination, incomplete emptying, incontinence, abnormal bleeding, painful periods, heavy periods, vaginal discharge, vaginal odor, painful intercourse, sexual problems, bleeding after intercourse.  Musculoskeletal: No muscle weakness  Skin/Breast: No painful breasts, nipple discharge, masses, rash, ulcers  Neurological: No passing out, seizures, numbness, headache  Endocrine: No diabetes, hypothyroid, hyperthyroid, hot flashes, hair loss, abnormal hair growth, acne  Psychiatric: No depression, crying  Hematologic: No bruises, bleeding, swollen lymph nodes, anemia.      Physical Exam:   Constitutional: She is oriented to person, place, and time. She appears well-developed and well-nourished.      Neck: No tracheal deviation present. No thyromegaly present.    Cardiovascular:       Exam reveals no edema.        Pulmonary/Chest: Effort normal. She exhibits no mass, no tenderness, no deformity and no retraction. Right breast exhibits no inverted nipple, no mass, no nipple discharge, no skin change, no tenderness, presence, no bleeding and no swelling. Left breast exhibits no inverted nipple, no mass, no nipple discharge, no skin change, no tenderness, presence, no bleeding and no swelling. Breasts are symmetrical.        Abdominal: Soft. She exhibits no distension and no mass. There is no abdominal tenderness. There is no rebound and  no guarding. No hernia. Hernia confirmed negative in the left inguinal area.     Genitourinary:    Vagina and uterus normal.   Rectum:      No external hemorrhoid.   There is no rash, tenderness or lesion on the right labia. There is no rash, tenderness or lesion on the left labia. Cervix is normal. No no adexnal prolapse. Right adnexum displays no mass, no tenderness and no fullness. Left adnexum displays no mass, no tenderness and no fullness. No  no vaginal discharge, tenderness, bleeding, rectocele, cystocele or unspecified prolapse of vaginal walls in the vagina. Cervix exhibits no motion tenderness, no discharge and no friability. Uterus is not deviated.           Musculoskeletal: Normal range of motion and moves all extremeties. No edema.       Neurological: She is alert and oriented to person, place, and time.    Skin: No rash noted. No erythema. No pallor.    Psychiatric: She has a normal mood and affect. Her behavior is normal. Judgment and thought content normal.     Left brass- denser area- mobile and not tender palpated at 1 o'clock  IUD strings not visible  ASSESSMENT:   well woman  Left breast mass    PLAN:   pap smear  Patient will need hysteroscopic IUD removal  Left breast ultrasound  return annually or prn    Spoke to Radiologist- Dr. Willie Butterfield- will get diagnostic mammogram and possible ultrasound because of high risk history. Jose Garcia will call her to schedule  Let patient know

## 2023-10-02 NOTE — TELEPHONE ENCOUNTER
Called pt back to reschedule breast ultrasound sooner, offered 10/12 pt stated will be out of town all next week. Advised pt will get with Dr Mcghee and see wht we can do

## 2023-10-03 ENCOUNTER — TELEPHONE (OUTPATIENT)
Dept: RADIOLOGY | Facility: HOSPITAL | Age: 28
End: 2023-10-03
Payer: COMMERCIAL

## 2023-10-04 ENCOUNTER — HOSPITAL ENCOUNTER (OUTPATIENT)
Dept: RADIOLOGY | Facility: HOSPITAL | Age: 28
Discharge: HOME | End: 2023-10-04
Attending: OBSTETRICS & GYNECOLOGY
Payer: COMMERCIAL

## 2023-10-04 DIAGNOSIS — N63.21 MASS OF UPPER OUTER QUADRANT OF LEFT BREAST: ICD-10-CM

## 2023-10-04 PROCEDURE — 76642 ULTRASOUND BREAST LIMITED: CPT | Mod: TC,LT

## 2023-10-04 PROCEDURE — 77066 MAMMO DIGITAL DIAGNOSTIC BILAT WITH TOMO: ICD-10-PCS | Mod: 26,,, | Performed by: RADIOLOGY

## 2023-10-04 PROCEDURE — 77062 BREAST TOMOSYNTHESIS BI: CPT | Mod: 26,,, | Performed by: RADIOLOGY

## 2023-10-04 PROCEDURE — 76642 ULTRASOUND BREAST LIMITED: CPT | Mod: 26,LT,, | Performed by: RADIOLOGY

## 2023-10-04 PROCEDURE — 77066 DX MAMMO INCL CAD BI: CPT | Mod: TC

## 2023-10-04 PROCEDURE — 77062 MAMMO DIGITAL DIAGNOSTIC BILAT WITH TOMO: ICD-10-PCS | Mod: 26,,, | Performed by: RADIOLOGY

## 2023-10-04 PROCEDURE — 77066 DX MAMMO INCL CAD BI: CPT | Mod: 26,,, | Performed by: RADIOLOGY

## 2023-10-04 PROCEDURE — 76642 US BREAST LEFT LIMITED: ICD-10-PCS | Mod: 26,LT,, | Performed by: RADIOLOGY

## 2023-10-05 ENCOUNTER — PATIENT MESSAGE (OUTPATIENT)
Dept: OBSTETRICS AND GYNECOLOGY | Facility: CLINIC | Age: 28
End: 2023-10-05
Payer: COMMERCIAL

## 2023-10-09 ENCOUNTER — TELEPHONE (OUTPATIENT)
Dept: OBSTETRICS AND GYNECOLOGY | Facility: CLINIC | Age: 28
End: 2023-10-09
Payer: COMMERCIAL

## 2023-10-09 DIAGNOSIS — Z91.89 AT HIGH RISK FOR BREAST CANCER: Primary | ICD-10-CM

## 2023-10-23 ENCOUNTER — PATIENT MESSAGE (OUTPATIENT)
Dept: OBSTETRICS AND GYNECOLOGY | Facility: CLINIC | Age: 28
End: 2023-10-23
Payer: COMMERCIAL

## 2023-10-23 ENCOUNTER — TELEPHONE (OUTPATIENT)
Dept: SURGERY | Facility: CLINIC | Age: 28
End: 2023-10-23
Payer: COMMERCIAL

## 2023-11-02 ENCOUNTER — OFFICE VISIT (OUTPATIENT)
Dept: URGENT CARE | Facility: CLINIC | Age: 28
End: 2023-11-02
Payer: COMMERCIAL

## 2023-11-02 VITALS
HEART RATE: 69 BPM | HEIGHT: 70 IN | SYSTOLIC BLOOD PRESSURE: 115 MMHG | OXYGEN SATURATION: 98 % | DIASTOLIC BLOOD PRESSURE: 75 MMHG | WEIGHT: 160 LBS | TEMPERATURE: 98 F | BODY MASS INDEX: 22.9 KG/M2 | RESPIRATION RATE: 18 BRPM

## 2023-11-02 DIAGNOSIS — J30.9 ALLERGIC RHINITIS, UNSPECIFIED SEASONALITY, UNSPECIFIED TRIGGER: ICD-10-CM

## 2023-11-02 DIAGNOSIS — R09.81 NASAL CONGESTION: Primary | ICD-10-CM

## 2023-11-02 DIAGNOSIS — H10.13 ALLERGIC CONJUNCTIVITIS OF BOTH EYES: ICD-10-CM

## 2023-11-02 LAB
CTP QC/QA: YES
SARS-COV-2 AG RESP QL IA.RAPID: NEGATIVE

## 2023-11-02 PROCEDURE — 87811 SARS CORONAVIRUS 2 ANTIGEN POCT, MANUAL READ: ICD-10-PCS | Mod: QW,S$GLB,, | Performed by: NURSE PRACTITIONER

## 2023-11-02 PROCEDURE — 99213 PR OFFICE/OUTPT VISIT, EST, LEVL III, 20-29 MIN: ICD-10-PCS | Mod: S$GLB,,, | Performed by: NURSE PRACTITIONER

## 2023-11-02 PROCEDURE — 87811 SARS-COV-2 COVID19 W/OPTIC: CPT | Mod: QW,S$GLB,, | Performed by: NURSE PRACTITIONER

## 2023-11-02 PROCEDURE — 99213 OFFICE O/P EST LOW 20 MIN: CPT | Mod: S$GLB,,, | Performed by: NURSE PRACTITIONER

## 2023-11-02 RX ORDER — OLOPATADINE HYDROCHLORIDE 1 MG/ML
1 SOLUTION/ DROPS OPHTHALMIC 2 TIMES DAILY PRN
Qty: 5 ML | Refills: 0 | Status: SHIPPED | OUTPATIENT
Start: 2023-11-02

## 2023-11-02 RX ORDER — FLUTICASONE PROPIONATE 50 MCG
2 SPRAY, SUSPENSION (ML) NASAL DAILY PRN
Qty: 15.8 ML | Refills: 0 | Status: SHIPPED | OUTPATIENT
Start: 2023-11-02

## 2023-11-02 NOTE — PATIENT INSTRUCTIONS
Please drink plenty of fluids.  Please get plenty of rest.  Please return here or go to the Emergency Department for any concerns or worsening of condition.  It is ok to take over the counter plain Allegra or Claritin or Zyrtec.   If you do have Hypertension or palpitations, it is safe to take Coricidin HBP for relief of sinus symptoms.  We recommend you take Flonase (Fluticasone) or another nasally inhaled steroid unless you are already taking one.  Nasal irrigation with a saline spray or Netti Pot like device per their directions is also recommended.  If not allergic, please take over the counter Tylenol (Acetaminophen) and/or Motrin (Ibuprofen) as directed for control of pain and/or fever.  Please follow up with your primary care doctor or specialist as needed.    If you  smoke, please stop smoking.      What is allergic conjunctivitis?  Allergic conjunctivitis is an irritation of the eyes. Grass, pollen ragweed, pet dander, dust and mold are common causes. Typical symptoms are: red, watery and itchy eyes. Other symptoms may include a runny nose, sneezing or a sore throat when you don't have a cold. This type of conjunctivitis is not contagious.  Treatment can include: General comfort measures, avoiding or reducing contact with allergens, washing your hands frequently and avoiding touching your eyes, antihistamine eye drops can also be used to treat symptoms.      2. Wash your hand often and keep them away from your eyes. Make sure to wash your hands before and after using any eye drops. When putting eye medicine in your eye, do not touch the tip of the dropper to your eye.    What if I still don't feel better?    Follow-up with your primary care provider if your symptoms get worse or show no improvement after 3 to 5 days of starting treatment.

## 2023-11-02 NOTE — PROGRESS NOTES
"Subjective:      Patient ID: Susu Su is a 28 y.o. female.    Vitals:  height is 5' 10" (1.778 m) and weight is 72.6 kg (160 lb). Her temperature is 98.1 °F (36.7 °C). Her blood pressure is 115/75 and her pulse is 69. Her respiration is 18 and oxygen saturation is 98%.     Chief Complaint: Nasal Congestion (Entered by patient)    Patient presents with c.o watery eyes and congestion that started after a run this morning. Denies chills, fever or body aches. No fever.     28-year-old female presents to clinic with complaints of watery eyes, nasal congestion after a run earlier today, treated with allegra. History positive for Moderna covid vaccine x 3 doses           Sinus Problem  This is a new problem. The current episode started today. The problem has been gradually worsening since onset. There has been no fever. Associated symptoms include congestion. Pertinent negatives include no chills or diaphoresis. Treatments tried: allegra. The treatment provided no relief.       Constitution: Negative for chills, sweating, fatigue and fever.   HENT:  Positive for congestion and postnasal drip.    Eyes:  Positive for eye discharge. Negative for eye itching, eye pain, eye redness and eyelid swelling.   Skin:  Negative for erythema.   Allergic/Immunologic: Positive for seasonal allergies.      Objective:     Physical Exam   Constitutional: She is oriented to person, place, and time. She appears well-developed.   HENT:   Head: Normocephalic and atraumatic. Head is without abrasion, without contusion and without laceration.   Ears:   Right Ear: External ear normal.   Left Ear: External ear normal.   Nose: Mucosal edema and rhinorrhea present.   Mouth/Throat: Oropharynx is clear and moist and mucous membranes are normal.   Eyes: Conjunctivae, EOM and lids are normal. Pupils are equal, round, and reactive to light. Right eye exhibits discharge. Left eye exhibits discharge.      Comments: Watery discharge both eyes "   Neck: Trachea normal and phonation normal. Neck supple.   Cardiovascular: Normal rate.   Pulmonary/Chest: Effort normal. No stridor. No respiratory distress.   Musculoskeletal: Normal range of motion.         General: Normal range of motion.   Neurological: She is alert and oriented to person, place, and time.   Skin: Skin is warm, dry, intact and no rash. Capillary refill takes less than 2 seconds. No abrasion, No burn, No bruising, No erythema and No ecchymosis   Psychiatric: Her speech is normal and behavior is normal. Judgment and thought content normal.   Nursing note and vitals reviewed.      Assessment:     1. Nasal congestion    2. Allergic rhinitis, unspecified seasonality, unspecified trigger    3. Allergic conjunctivitis of both eyes      Results for orders placed or performed in visit on 11/02/23   SARS Coronavirus 2 Antigen, POCT Manual Read   Result Value Ref Range    SARS Coronavirus 2 Antigen Negative Negative     Acceptable Yes         Plan:       Nasal congestion  -     SARS Coronavirus 2 Antigen, POCT Manual Read  -     fluticasone propionate (FLONASE) 50 mcg/actuation nasal spray; 2 sprays (100 mcg total) by Each Nostril route daily as needed for Rhinitis (nasal congestion).  Dispense: 15.8 mL; Refill: 0    Allergic rhinitis, unspecified seasonality, unspecified trigger  -     fluticasone propionate (FLONASE) 50 mcg/actuation nasal spray; 2 sprays (100 mcg total) by Each Nostril route daily as needed for Rhinitis (nasal congestion).  Dispense: 15.8 mL; Refill: 0    Allergic conjunctivitis of both eyes  -     olopatadine (PATANOL) 0.1 % ophthalmic solution; Place 1 drop into both eyes 2 (two) times daily as needed for Allergies.  Dispense: 5 mL; Refill: 0      Patient Instructions   Please drink plenty of fluids.  Please get plenty of rest.  Please return here or go to the Emergency Department for any concerns or worsening of condition.  It is ok to take over the counter plain  Allegra or Claritin or Zyrtec.   If you do have Hypertension or palpitations, it is safe to take Coricidin HBP for relief of sinus symptoms.  We recommend you take Flonase (Fluticasone) or another nasally inhaled steroid unless you are already taking one.  Nasal irrigation with a saline spray or Netti Pot like device per their directions is also recommended.  If not allergic, please take over the counter Tylenol (Acetaminophen) and/or Motrin (Ibuprofen) as directed for control of pain and/or fever.  Please follow up with your primary care doctor or specialist as needed.    If you  smoke, please stop smoking.      What is allergic conjunctivitis?  Allergic conjunctivitis is an irritation of the eyes. Grass, pollen ragweed, pet dander, dust and mold are common causes. Typical symptoms are: red, watery and itchy eyes. Other symptoms may include a runny nose, sneezing or a sore throat when you don't have a cold. This type of conjunctivitis is not contagious.  Treatment can include: General comfort measures, avoiding or reducing contact with allergens, washing your hands frequently and avoiding touching your eyes, antihistamine eye drops can also be used to treat symptoms.      2. Wash your hand often and keep them away from your eyes. Make sure to wash your hands before and after using any eye drops. When putting eye medicine in your eye, do not touch the tip of the dropper to your eye.    What if I still don't feel better?    Follow-up with your primary care provider if your symptoms get worse or show no improvement after 3 to 5 days of starting treatment.

## 2023-11-06 DIAGNOSIS — E78.00 PURE HYPERCHOLESTEROLEMIA: Primary | ICD-10-CM

## 2023-11-06 RX ORDER — ATORVASTATIN CALCIUM 10 MG/1
10 TABLET, FILM COATED ORAL DAILY
Qty: 30 TABLET | Refills: 0 | Status: SHIPPED | OUTPATIENT
Start: 2023-11-06 | End: 2024-01-23

## 2023-11-06 NOTE — TELEPHONE ENCOUNTER
No care due was identified.  [facility] Embedded Care Due Messages. Reference number: [id number].   11/06/2023 4:20:03 PM CST

## 2023-11-07 ENCOUNTER — OFFICE VISIT (OUTPATIENT)
Dept: HEMATOLOGY/ONCOLOGY | Facility: CLINIC | Age: 28
End: 2023-11-07
Payer: COMMERCIAL

## 2023-11-07 VITALS
RESPIRATION RATE: 20 BRPM | HEIGHT: 70 IN | DIASTOLIC BLOOD PRESSURE: 70 MMHG | WEIGHT: 164.56 LBS | SYSTOLIC BLOOD PRESSURE: 128 MMHG | OXYGEN SATURATION: 100 % | TEMPERATURE: 98 F | BODY MASS INDEX: 23.56 KG/M2 | HEART RATE: 60 BPM

## 2023-11-07 DIAGNOSIS — E10.9 TYPE 1 DIABETES MELLITUS WITHOUT COMPLICATION: Primary | ICD-10-CM

## 2023-11-07 DIAGNOSIS — Z91.89 AT HIGH RISK FOR BREAST CANCER: ICD-10-CM

## 2023-11-07 DIAGNOSIS — Z80.3 FAMILY HISTORY OF BREAST CANCER: ICD-10-CM

## 2023-11-07 PROCEDURE — 1159F PR MEDICATION LIST DOCUMENTED IN MEDICAL RECORD: ICD-10-PCS | Mod: CPTII,S$GLB,, | Performed by: STUDENT IN AN ORGANIZED HEALTH CARE EDUCATION/TRAINING PROGRAM

## 2023-11-07 PROCEDURE — 1159F MED LIST DOCD IN RCRD: CPT | Mod: CPTII,S$GLB,, | Performed by: STUDENT IN AN ORGANIZED HEALTH CARE EDUCATION/TRAINING PROGRAM

## 2023-11-07 PROCEDURE — 3044F HG A1C LEVEL LT 7.0%: CPT | Mod: CPTII,S$GLB,, | Performed by: STUDENT IN AN ORGANIZED HEALTH CARE EDUCATION/TRAINING PROGRAM

## 2023-11-07 PROCEDURE — 99205 OFFICE O/P NEW HI 60 MIN: CPT | Mod: S$GLB,,, | Performed by: STUDENT IN AN ORGANIZED HEALTH CARE EDUCATION/TRAINING PROGRAM

## 2023-11-07 PROCEDURE — 3008F PR BODY MASS INDEX (BMI) DOCUMENTED: ICD-10-PCS | Mod: CPTII,S$GLB,, | Performed by: STUDENT IN AN ORGANIZED HEALTH CARE EDUCATION/TRAINING PROGRAM

## 2023-11-07 PROCEDURE — 3044F PR MOST RECENT HEMOGLOBIN A1C LEVEL <7.0%: ICD-10-PCS | Mod: CPTII,S$GLB,, | Performed by: STUDENT IN AN ORGANIZED HEALTH CARE EDUCATION/TRAINING PROGRAM

## 2023-11-07 PROCEDURE — 3074F PR MOST RECENT SYSTOLIC BLOOD PRESSURE < 130 MM HG: ICD-10-PCS | Mod: CPTII,S$GLB,, | Performed by: STUDENT IN AN ORGANIZED HEALTH CARE EDUCATION/TRAINING PROGRAM

## 2023-11-07 PROCEDURE — 3078F PR MOST RECENT DIASTOLIC BLOOD PRESSURE < 80 MM HG: ICD-10-PCS | Mod: CPTII,S$GLB,, | Performed by: STUDENT IN AN ORGANIZED HEALTH CARE EDUCATION/TRAINING PROGRAM

## 2023-11-07 PROCEDURE — 99999 PR PBB SHADOW E&M-EST. PATIENT-LVL V: ICD-10-PCS | Mod: PBBFAC,,, | Performed by: STUDENT IN AN ORGANIZED HEALTH CARE EDUCATION/TRAINING PROGRAM

## 2023-11-07 PROCEDURE — 3008F BODY MASS INDEX DOCD: CPT | Mod: CPTII,S$GLB,, | Performed by: STUDENT IN AN ORGANIZED HEALTH CARE EDUCATION/TRAINING PROGRAM

## 2023-11-07 PROCEDURE — 3078F DIAST BP <80 MM HG: CPT | Mod: CPTII,S$GLB,, | Performed by: STUDENT IN AN ORGANIZED HEALTH CARE EDUCATION/TRAINING PROGRAM

## 2023-11-07 PROCEDURE — 3074F SYST BP LT 130 MM HG: CPT | Mod: CPTII,S$GLB,, | Performed by: STUDENT IN AN ORGANIZED HEALTH CARE EDUCATION/TRAINING PROGRAM

## 2023-11-07 PROCEDURE — 99205 PR OFFICE/OUTPT VISIT, NEW, LEVL V, 60-74 MIN: ICD-10-PCS | Mod: S$GLB,,, | Performed by: STUDENT IN AN ORGANIZED HEALTH CARE EDUCATION/TRAINING PROGRAM

## 2023-11-07 PROCEDURE — 99999 PR PBB SHADOW E&M-EST. PATIENT-LVL V: CPT | Mod: PBBFAC,,, | Performed by: STUDENT IN AN ORGANIZED HEALTH CARE EDUCATION/TRAINING PROGRAM

## 2023-11-07 NOTE — PROGRESS NOTES
Reason For Consultation:   Increased lifetime risk of breast cancer    Referring Provider:   Alona Mcghee MD  [address]  [address]  [place] [address]    Records Obtained: Records of the patients history including those obtained from the referring provider were reviewed and summarized in detail.    HPI:   Susu Su is a 27yo woman who presents for consultation of increased risk of breast cancer. She is premenopausal. She presented for L breast diagnostic mammogram on 10/4/23 which was benign but revealed a Tyrer-Cuzick score of 20.9%.     Today she reports that she is feeling well. Denies breast changes or concerns.     High Risk Breast cancer specific history:  - Age: 28 y.o.   - Height:  5'10''  - Weight: 160lbs  - Breast density per BI-RADS:  extremely dense  - Age at menarche:  10yo  - Number of pregnancies: ;   - Age at menopause, if applicable:  N/A. Reports irregular menstrual cycle- has IUD.  -Uterus and ovaries intact: Yes  - HRT: No  - Genetic testing: No  - Personal history of cancer: No  - Previous chest radiation exposure between ages 10-30 years old: No  - Personal history of breast biopsy: No  - [employer] Inheritance: No  - Family history of cancer:  Yes- maternal aunt dx with breast cancer at 58yo, paternal aunt w uterine cancer    Social History:  Tobacco use:  Denies  Alcohol use:  Socially  Exercise regimen: Yes- exercises regularly   Employment: works in real estate    SEE CALCULATED RISK BELOW.     Past Medical   Past Medical History:   Diagnosis Date    Diabetes mellitus type I     Family history of high cholesterol     H/o Atorvastin 5mg     Hyperlipidemia     Type I (juvenile type) diabetes mellitus without mention of complication, uncontrolled 2012     Patient Active Problem List   Diagnosis    Diabetes mellitus type I    HLD (hyperlipidemia)    Hyperglycemia     Social History   Social History     Tobacco Use    Smoking status: Never     " Passive exposure: Never    Smokeless tobacco: Never   Substance Use Topics    Alcohol use: Yes     Alcohol/week: 4.0 standard drinks of alcohol     Types: 4 Glasses of wine per week     Comment: occ    Drug use: No     Family History  Family History   Problem Relation Age of Onset    Heart disease Paternal Grandfather 60    Other Father         ablation    Hyperlipidemia Mother     Karla Parkinson White syndrome Mother     Breast cancer Maternal Aunt 60    Uterine cancer Paternal Aunt     Diabetes Cousin     Colon cancer Other     Cancer Neg Hx     Cervical cancer Neg Hx     Ovarian cancer Neg Hx      Medications    Current Outpatient Medications:     atorvastatin (LIPITOR) 10 MG tablet, Take 1 tablet (10 mg total) by mouth once daily., Disp: 30 tablet, Rfl: 0    BAQSIMI 3 mg/actuation Spry, 3 MG NASAL AS DIRECTED IN ONE NOSTRIL DOSE DOES NOT NEED TO BE INHALED, Disp: 2 each, Rfl: 6    BD INSULIN SYRINGE ULT-FINE II 0.3 mL 31 x 5/16" Syrg, USE AS DIRECTED 5 TIMES A DAY FOR INSULIN ADMINISTRATION, Disp: 500 each, Rfl: 1    CONTOUR NEXT TEST STRIPS Strp, SMARTSIG:Via Meter 10 Times Daily PRN, Disp: , Rfl:     DEXCOM G7  Misc, , Disp: , Rfl:     DEXCOM G7 SENSOR Marti, CBG: CHANGE EVERY 10 DAYS, Disp: , Rfl:     fluticasone propionate (FLONASE) 50 mcg/actuation nasal spray, 2 sprays (100 mcg total) by Each Nostril route daily as needed for Rhinitis (nasal congestion)., Disp: 15.8 mL, Rfl: 0    glucagon, human recombinant, (GLUCAGON) 1 mg Kit, Inject 1 mL (1 mg total) into the skin as needed (for emergency hypoglycemic episode)., Disp: 3 kit, Rfl: 1    insulin aspart U-100 (NOVOLOG U-100 INSULIN ASPART) 100 unit/mL injection, ACCORDING TO YOUR SLIDING SCALE (Patient taking differently: Up to 50 units daily via insulin pump), Disp: 10 mL, Rfl: 4    insulin glargine, TOUJEO, (TOUJEO SOLOSTAR U-300 INSULIN) 300 unit/mL (1.5 mL) InPn pen, Inject 20u daily in the event of insulin pump failure, Disp: 3 mL, Rfl: 6    " "insulin glargine,hum.rec.anlog (LANTUS U-100 INSULIN SUBQ), Inject into the skin., Disp: , Rfl:     insulin lispro 100 unit/mL injection, Inject into the skin 3 (three) times daily before meals., Disp: , Rfl:     insulin syringe-needle U-100 1/2 mL 30 x 5/16" Syrg, For four to five times administration of insulin., Disp: 300 each, Rfl: 4    lancets (FREESTYLE LANCETS) Misc, 6 Sticks by Misc.(Non-Drug; Combo Route) route once daily., Disp: 700 each, Rfl: 3    levonorgestreL (MIRENA) 20 mcg/24 hours (7 yrs) 52 mg IUD, 1 each by Intrauterine route once., Disp: , Rfl:     olopatadine (PATANOL) 0.1 % ophthalmic solution, Place 1 drop into both eyes 2 (two) times daily as needed for Allergies., Disp: 5 mL, Rfl: 0    OMNIPOD DASH PODS, GEN 4, Crtg, Inject into the skin., Disp: , Rfl:     ondansetron (ZOFRAN-ODT) 8 MG TbDL, Take 8 mg by mouth every 8 (eight) hours as needed., Disp: , Rfl:     pen needle, diabetic (BD ULTRA-FINE HUSSAIN PEN NEEDLE) 32 gauge x 5/32" Ndle, 1 each by Misc.(Non-Drug; Combo Route) route once daily., Disp: 50 each, Rfl: 6    SOOLANTRA 1 % Crea, SMARTSIG:sparingly Topical Daily, Disp: , Rfl:     spironolactone (ALDACTONE) 100 MG tablet, Take 100 mg by mouth., Disp: , Rfl:     Allergies  Review of patient's allergies indicates:  No Known Allergies    Review of Systems  12pt ROS negative except as noted above     Objective:     Vitals:   Vitals:    11/07/23 1606   BP: 128/70   Pulse: 60   Resp: 20   Temp: 98 °F (36.7 °C)   TempSrc: Oral   SpO2: 100%   Weight: 74.6 kg (164 lb 9.2 oz)   Height: 5' 10" (1.778 m)     BMI: Body mass index is 23.61 kg/m².   Body surface area is 1.92 meters squared.    Physical Exam:  ECOG 0   General: well appearing, in no apparent distress  HEENT: Normocephalic, EOMI, anicteric sclerae, MMM  Neck: supple, without cervical or supraclavicular lymphadenopathy.  Heart: regular rate and rhythm, normal S1 and S2, no murmurs, gallops or rubs.  Lungs: Clear to auscultation " bilaterally, no increased wob  Breast: dense breast tissue; no appreciable masses or axillary LAD  Abdomen: Soft, nontender, nondistended with normal bowel sounds. No hepatosplenomegaly.  Extremities: No LE edema or joint effusion  Skin: warm, well-perfused, no rash  Neurologic: Alert and oriented x 4, normal speech and gait   Psychiatric: Conversing appropriately with providers throughout today's encounter.    Laboratory Data: reviewed most recent   Imaging: reviewed most recent    Assessment:   Susu is a ej 27yo woman who presents today for evaluation for increased risk of breast cancer.     #Increased risk of breast cancer:   Tyrer-Cuzick (TC) lifetime risk of 20.4%- re-calculated today. We discussed that TC score will categorize your lifetime risk of being diagnosed with breast cancer. Categories are as such: Average risk <15%, Intermediate risk 15-19%, and High risk > or = to 20%. Reviewed recommendations for women with elevated TC score including CBE q6-12o, annual screening MMG, annual breast MRI, consideration of risk reduction strategies, genetic counseling and breast awareness.     Review that risk factors are categorized into 2 groups: Modifiable and Non-modifiable. Modifiable risk factors include use of hormones, alcohol, smoking, diet and exercise. Non-modifiable risk factors include breast density, genetics, chest radiation, previous pregnancies, age of first period, and age of menopause. For women at high risk for breast cancer, endocrine therapy can reduce the risk of invasive and/or in situ breast cancers. While the above have been shown to lower the risk of breast cancer incidence, there is no survival benefit in patients who don't have breast cancer.     Reviewed Lifestyle modifications which have shown benefit:  Limit alcohol consumption to less than 1 drink per day (1 ounce liquor, 6 oz wine, 8 oz beer)  Avoid smoking.  Exercise at least 150 minutes per week of moderate intensity aerobic  activity or at least 75 minutes of vigorous activity. Exercise can lower the relative risk of breast cancer by ~18-20%.  Maintain healthy weight and avoid post-menopausal weight gain. Avoid processed foods and eat more lean proteins, fruits and vegetables.     Discussed available resources including genetic counseling, nutrition, weight management and reviewed future screening plan- to begin in late 30s:   Semiannual (every 6 months) CBE (clinical breast exam).   Annual Breast MRI alternating with an annual MMG.       Plan:     No role for chemoprevention at this time  Patient elects to proceed with alternating annual mammogram and annual breast MRI along with semiannual CBEs and a referral to [facility] for genetic counseling.  Patient will follow up with PCP or GYN for semiannual CBE along with annual mammogram- discussed timing of this. Recommendations for 39yo although given her risk and FH it is certainly reasonable to start early. Will cont annual screening MMG and annual breast MRI at that time.  Lifestyle modifications as detailed above.   5.   Encouraged breast awareness, including monthly breast self-exams.   6.   Referral placed for Genetic counseling.         Questions were encouraged and answered to patient's satisfaction, and patient verbalized understanding of information and agreement with the plan. Advised patient to RTC with any interval changes or concerns.       Jennie Morales MD      Med Onc Chart Routing      Follow up with physician No follow up needed.   Follow up with FELISHA    Infusion scheduling note    Injection scheduling note    Labs None   Scheduling:  Preferred lab:  Lab interval:     Imaging None      Pharmacy appointment No pharmacy appointment needed      Other referrals no referral to Oncology Primary Care needed -  no Massage appointment needed    Additional referrals needed  genetics               Total time of this visit, including time spent face to face with patient and/or via  video/audio, and also in preparing for today's visit for MDM and documentation. (Medical Decision Making, including consideration of possible diagnoses, management options, complex medical record review, review of diagnostic tests and information, consideration and discussion of significant complications based on comorbidities, and discussion with providers involved with the care of the patient) 60 minutes. Greater than 50% was spent face to face with the patient counseling and coordinating care.

## 2023-11-22 ENCOUNTER — PATIENT MESSAGE (OUTPATIENT)
Dept: OBSTETRICS AND GYNECOLOGY | Facility: CLINIC | Age: 28
End: 2023-11-22
Payer: COMMERCIAL

## 2023-11-22 ENCOUNTER — PATIENT MESSAGE (OUTPATIENT)
Dept: HEMATOLOGY/ONCOLOGY | Facility: CLINIC | Age: 28
End: 2023-11-22
Payer: COMMERCIAL

## 2023-11-29 ENCOUNTER — PATIENT MESSAGE (OUTPATIENT)
Dept: HEMATOLOGY/ONCOLOGY | Facility: CLINIC | Age: 28
End: 2023-11-29
Payer: COMMERCIAL

## 2023-12-01 ENCOUNTER — HOSPITAL ENCOUNTER (EMERGENCY)
Facility: HOSPITAL | Age: 28
Discharge: HOME | End: 2023-12-01
Attending: EMERGENCY MEDICINE
Payer: COMMERCIAL

## 2023-12-01 VITALS
OXYGEN SATURATION: 99 % | WEIGHT: 155 LBS | TEMPERATURE: 99 F | DIASTOLIC BLOOD PRESSURE: 57 MMHG | BODY MASS INDEX: 22.24 KG/M2 | HEART RATE: 76 BPM | SYSTOLIC BLOOD PRESSURE: 109 MMHG | RESPIRATION RATE: 20 BRPM

## 2023-12-01 DIAGNOSIS — R73.9 HYPERGLYCEMIA: ICD-10-CM

## 2023-12-01 DIAGNOSIS — R11.10 VOMITING, UNSPECIFIED VOMITING TYPE, UNSPECIFIED WHETHER NAUSEA PRESENT: Primary | ICD-10-CM

## 2023-12-01 DIAGNOSIS — R82.4 KETONURIA: ICD-10-CM

## 2023-12-01 LAB
ALBUMIN SERPL BCP-MCNC: 4.1 G/DL (ref 3.5–5.2)
ALLENS TEST: ABNORMAL
ALLENS TEST: ABNORMAL
ALP SERPL-CCNC: 62 U/L (ref 55–135)
ALT SERPL W/O P-5'-P-CCNC: 11 U/L (ref 10–44)
ANION GAP SERPL CALC-SCNC: 12 MMOL/L (ref 8–16)
ANION GAP SERPL CALC-SCNC: 6 MMOL/L (ref 8–16)
AST SERPL-CCNC: 18 U/L (ref 10–40)
B-HCG UR QL: NEGATIVE
B-OH-BUTYR BLD STRIP-SCNC: 2 MMOL/L (ref 0–0.5)
BACTERIA #/AREA URNS AUTO: NORMAL /HPF
BASOPHILS # BLD AUTO: 0.04 K/UL (ref 0–0.2)
BASOPHILS NFR BLD: 0.4 % (ref 0–1.9)
BILIRUB SERPL-MCNC: 0.9 MG/DL (ref 0.1–1)
BILIRUB UR QL STRIP: NEGATIVE
BUN SERPL-MCNC: 14 MG/DL (ref 6–20)
BUN SERPL-MCNC: 15 MG/DL (ref 6–20)
CALCIUM SERPL-MCNC: 8 MG/DL (ref 8.7–10.5)
CALCIUM SERPL-MCNC: 9.4 MG/DL (ref 8.7–10.5)
CHLORIDE SERPL-SCNC: 104 MMOL/L (ref 95–110)
CHLORIDE SERPL-SCNC: 109 MMOL/L (ref 95–110)
CLARITY UR REFRACT.AUTO: CLEAR
CO2 SERPL-SCNC: 20 MMOL/L (ref 23–29)
CO2 SERPL-SCNC: 20 MMOL/L (ref 23–29)
COLOR UR AUTO: YELLOW
CREAT SERPL-MCNC: 0.8 MG/DL (ref 0.5–1.4)
CREAT SERPL-MCNC: 1.1 MG/DL (ref 0.5–1.4)
CTP QC/QA: YES
DIFFERENTIAL METHOD: ABNORMAL
EOSINOPHIL # BLD AUTO: 0 K/UL (ref 0–0.5)
EOSINOPHIL NFR BLD: 0.3 % (ref 0–8)
ERYTHROCYTE [DISTWIDTH] IN BLOOD BY AUTOMATED COUNT: 12.9 % (ref 11.5–14.5)
EST. GFR  (NO RACE VARIABLE): >60 ML/MIN/1.73 M^2
EST. GFR  (NO RACE VARIABLE): >60 ML/MIN/1.73 M^2
GLUCOSE SERPL-MCNC: 195 MG/DL (ref 70–110)
GLUCOSE SERPL-MCNC: 257 MG/DL (ref 70–110)
GLUCOSE UR QL STRIP: ABNORMAL
HCO3 UR-SCNC: 20.8 MMOL/L (ref 24–28)
HCO3 UR-SCNC: 22.7 MMOL/L (ref 24–28)
HCT VFR BLD AUTO: 39.4 % (ref 37–48.5)
HGB BLD-MCNC: 13.6 G/DL (ref 12–16)
HGB UR QL STRIP: NEGATIVE
IMM GRANULOCYTES # BLD AUTO: 0.02 K/UL (ref 0–0.04)
IMM GRANULOCYTES NFR BLD AUTO: 0.2 % (ref 0–0.5)
INFLUENZA A, MOLECULAR: NOT DETECTED
INFLUENZA B, MOLECULAR: NOT DETECTED
KETONES UR QL STRIP: ABNORMAL
LEUKOCYTE ESTERASE UR QL STRIP: ABNORMAL
LIPASE SERPL-CCNC: 11 U/L (ref 4–60)
LYMPHOCYTES # BLD AUTO: 0.3 K/UL (ref 1–4.8)
LYMPHOCYTES NFR BLD: 3 % (ref 18–48)
MCH RBC QN AUTO: 33.2 PG (ref 27–31)
MCHC RBC AUTO-ENTMCNC: 34.5 G/DL (ref 32–36)
MCV RBC AUTO: 96 FL (ref 82–98)
MICROSCOPIC COMMENT: NORMAL
MONOCYTES # BLD AUTO: 0.8 K/UL (ref 0.3–1)
MONOCYTES NFR BLD: 8.5 % (ref 4–15)
NEUTROPHILS # BLD AUTO: 8.2 K/UL (ref 1.8–7.7)
NEUTROPHILS NFR BLD: 87.6 % (ref 38–73)
NITRITE UR QL STRIP: NEGATIVE
NRBC BLD-RTO: 0 /100 WBC
PCO2 BLDA: 35.9 MMHG (ref 35–45)
PCO2 BLDA: 37.5 MMHG (ref 35–45)
PH SMN: 7.37 [PH] (ref 7.35–7.45)
PH SMN: 7.39 [PH] (ref 7.35–7.45)
PH UR STRIP: 8 [PH] (ref 5–8)
PLATELET # BLD AUTO: 217 K/UL (ref 150–450)
PMV BLD AUTO: 9.5 FL (ref 9.2–12.9)
PO2 BLDA: 28 MMHG (ref 40–60)
PO2 BLDA: 57 MMHG (ref 40–60)
POC BE: -2 MMOL/L
POC BE: -5 MMOL/L
POC SATURATED O2: 54 % (ref 95–100)
POC SATURATED O2: 89 % (ref 95–100)
POC TCO2: 22 MMOL/L (ref 24–29)
POC TCO2: 24 MMOL/L (ref 24–29)
POCT GLUCOSE: 197 MG/DL (ref 70–110)
POCT GLUCOSE: 237 MG/DL (ref 70–110)
POCT GLUCOSE: 250 MG/DL (ref 70–110)
POTASSIUM SERPL-SCNC: 3.9 MMOL/L (ref 3.5–5.1)
POTASSIUM SERPL-SCNC: 4.7 MMOL/L (ref 3.5–5.1)
PROT SERPL-MCNC: 7.2 G/DL (ref 6–8.4)
PROT UR QL STRIP: ABNORMAL
RBC # BLD AUTO: 4.1 M/UL (ref 4–5.4)
RBC #/AREA URNS AUTO: 0 /HPF (ref 0–4)
RSV AG BY MOLECULAR METHOD: NOT DETECTED
SAMPLE: ABNORMAL
SAMPLE: ABNORMAL
SARS-COV-2 RNA RESP QL NAA+PROBE: NOT DETECTED
SITE: ABNORMAL
SITE: ABNORMAL
SODIUM SERPL-SCNC: 135 MMOL/L (ref 136–145)
SODIUM SERPL-SCNC: 136 MMOL/L (ref 136–145)
SP GR UR STRIP: 1.03 (ref 1–1.03)
SQUAMOUS #/AREA URNS AUTO: 13 /HPF
URN SPEC COLLECT METH UR: ABNORMAL
WBC # BLD AUTO: 9.37 K/UL (ref 3.9–12.7)
WBC #/AREA URNS AUTO: 1 /HPF (ref 0–5)
YEAST UR QL AUTO: NORMAL

## 2023-12-01 PROCEDURE — 99900035 HC TECH TIME PER 15 MIN (STAT)

## 2023-12-01 PROCEDURE — 93005 ELECTROCARDIOGRAM TRACING: CPT

## 2023-12-01 PROCEDURE — 63600175 PHARM REV CODE 636 W HCPCS: Performed by: EMERGENCY MEDICINE

## 2023-12-01 PROCEDURE — 0241U SARS-COV2 (COVID) WITH FLU/RSV BY PCR: CPT | Performed by: EMERGENCY MEDICINE

## 2023-12-01 PROCEDURE — 85025 COMPLETE CBC W/AUTO DIFF WBC: CPT | Performed by: EMERGENCY MEDICINE

## 2023-12-01 PROCEDURE — 82010 KETONE BODYS QUAN: CPT | Performed by: EMERGENCY MEDICINE

## 2023-12-01 PROCEDURE — 99285 EMERGENCY DEPT VISIT HI MDM: CPT | Mod: 25

## 2023-12-01 PROCEDURE — 96361 HYDRATE IV INFUSION ADD-ON: CPT

## 2023-12-01 PROCEDURE — 83690 ASSAY OF LIPASE: CPT | Performed by: EMERGENCY MEDICINE

## 2023-12-01 PROCEDURE — 25500020 PHARM REV CODE 255: Performed by: EMERGENCY MEDICINE

## 2023-12-01 PROCEDURE — 96374 THER/PROPH/DIAG INJ IV PUSH: CPT | Mod: 59

## 2023-12-01 PROCEDURE — 93010 ELECTROCARDIOGRAM REPORT: CPT | Mod: ,,, | Performed by: INTERNAL MEDICINE

## 2023-12-01 PROCEDURE — 81025 URINE PREGNANCY TEST: CPT | Performed by: EMERGENCY MEDICINE

## 2023-12-01 PROCEDURE — 81001 URINALYSIS AUTO W/SCOPE: CPT | Performed by: EMERGENCY MEDICINE

## 2023-12-01 PROCEDURE — 25000003 PHARM REV CODE 250: Performed by: EMERGENCY MEDICINE

## 2023-12-01 PROCEDURE — 93010 EKG 12-LEAD: ICD-10-PCS | Mod: ,,, | Performed by: INTERNAL MEDICINE

## 2023-12-01 PROCEDURE — 82803 BLOOD GASES ANY COMBINATION: CPT

## 2023-12-01 PROCEDURE — 80053 COMPREHEN METABOLIC PANEL: CPT | Performed by: EMERGENCY MEDICINE

## 2023-12-01 PROCEDURE — 80048 BASIC METABOLIC PNL TOTAL CA: CPT | Mod: XB | Performed by: EMERGENCY MEDICINE

## 2023-12-01 RX ORDER — ONDANSETRON 2 MG/ML
4 INJECTION INTRAMUSCULAR; INTRAVENOUS
Status: COMPLETED | OUTPATIENT
Start: 2023-12-01 | End: 2023-12-01

## 2023-12-01 RX ORDER — ONDANSETRON 4 MG/1
4 TABLET, FILM COATED ORAL EVERY 6 HOURS PRN
Qty: 12 TABLET | Refills: 0 | Status: SHIPPED | OUTPATIENT
Start: 2023-12-01

## 2023-12-01 RX ADMIN — SODIUM CHLORIDE 1000 ML: 9 INJECTION, SOLUTION INTRAVENOUS at 08:12

## 2023-12-01 RX ADMIN — ONDANSETRON 4 MG: 2 INJECTION INTRAMUSCULAR; INTRAVENOUS at 06:12

## 2023-12-01 RX ADMIN — IOHEXOL 100 ML: 350 INJECTION, SOLUTION INTRAVENOUS at 09:12

## 2023-12-01 RX ADMIN — SODIUM CHLORIDE 1000 ML: 9 INJECTION, SOLUTION INTRAVENOUS at 11:12

## 2023-12-01 RX ADMIN — SODIUM CHLORIDE 1000 ML: 9 INJECTION, SOLUTION INTRAVENOUS at 06:12

## 2023-12-01 NOTE — ED PROVIDER NOTES
Encounter Date: 12/1/2023       History     Chief Complaint   Patient presents with    Nausea    Vomiting     Pt c/o N/V x 5 hrs. Hx Type I DM     27 yo female presenting with abdominal pain, vomiting.    PMH:  DM type I, HLD    Context:  The patient is presenting with at least 10 episodes of nonbloody, nonbilious vomiting.  Last bowel movement yesterday.  No prior abdominal surgeries.  Patient has an insulin pump in place, states she has been keeping her blood sugar around the 200s because she hasn't been able to eat.  She reports her abdomen has been bloated and she has diffuse abdominal pain.  Symptoms began approximately 5 hours ago.  She states a lot of people at work have been sick recently with URI symptoms.  Onset:  Gradual  Location:  Diffuse abdomen  Duration:  Last 5 hours  Associated Symptoms:  Denies dysuria or hematuria, positive chills    Patient has an IUD in place, no vaginal discharge or concern for infection.       The history is provided by the patient and medical records. No  was used.     Review of patient's allergies indicates:  No Known Allergies  Past Medical History:   Diagnosis Date    Diabetes mellitus type I     Family history of high cholesterol     H/o Atorvastin 5mg     Hyperlipidemia     Type I (juvenile type) diabetes mellitus without mention of complication, uncontrolled 08/06/2012     Past Surgical History:   Procedure Laterality Date    WISDOM TOOTH EXTRACTION Bilateral 05/01/2014     Family History   Problem Relation Age of Onset    Heart disease Paternal Grandfather 60    Other Father         ablation    Hyperlipidemia Mother     Karla Parkinson White syndrome Mother     Breast cancer Maternal Aunt 60    Uterine cancer Paternal Aunt     Diabetes Cousin     Colon cancer Other     Cancer Neg Hx     Cervical cancer Neg Hx     Ovarian cancer Neg Hx      Social History     Tobacco Use    Smoking status: Never     Passive exposure: Never    Smokeless tobacco:  Never   Substance Use Topics    Alcohol use: Yes     Alcohol/week: 4.0 standard drinks of alcohol     Types: 4 Glasses of wine per week     Comment: occ    Drug use: No     Review of Systems   Constitutional:  Positive for chills.   Respiratory:  Negative for shortness of breath.    Cardiovascular:  Negative for chest pain.   Gastrointestinal:  Positive for abdominal pain, nausea and vomiting.   Genitourinary:  Negative for dysuria, hematuria and vaginal discharge.       Physical Exam     Initial Vitals [12/01/23 0554]   BP Pulse Resp Temp SpO2   124/65 93 16 100 °F (37.8 °C) 98 %      MAP       --         Physical Exam    Nursing note and vitals reviewed.  Constitutional: She is not diaphoretic. No distress.   Appears uncomfortable    HENT:   Head: Normocephalic and atraumatic.   Dry mucous membranes   Eyes: Right eye exhibits no discharge. Left eye exhibits no discharge.   Neck: Neck supple. No tracheal deviation present.   Cardiovascular:  Normal rate and regular rhythm.           Pulmonary/Chest: Breath sounds normal. No respiratory distress.   Abdominal: Abdomen is soft. There is abdominal tenderness (mild, diffuse, not peritonitic). There is no tenderness at McBurney's point and negative Mascorro's sign.   Musculoskeletal:      Cervical back: Neck supple.      Comments: Omnipod RUE      Neurological: She is alert and oriented to person, place, and time.   Skin: Skin is warm. No rash noted.   Psychiatric: She has a normal mood and affect. Her behavior is normal.         ED Course   Procedures  Labs Reviewed   CBC W/ AUTO DIFFERENTIAL - Abnormal; Notable for the following components:       Result Value    MCH 33.2 (*)     Gran # (ANC) 8.2 (*)     Lymph # 0.3 (*)     Gran % 87.6 (*)     Lymph % 3.0 (*)     All other components within normal limits   COMPREHENSIVE METABOLIC PANEL - Abnormal; Notable for the following components:    CO2 20 (*)     Glucose 257 (*)     All other components within normal limits   BETA -  HYDROXYBUTYRATE, SERUM - Abnormal; Notable for the following components:    Beta-Hydroxybutyrate 2.0 (*)     All other components within normal limits   URINALYSIS, REFLEX TO URINE CULTURE - Abnormal; Notable for the following components:    Protein, UA Trace (*)     Glucose, UA 3+ (*)     Ketones, UA 3+ (*)     Leukocytes, UA 1+ (*)     All other components within normal limits    Narrative:     Specimen Source->Urine   BASIC METABOLIC PANEL - Abnormal; Notable for the following components:    Sodium 135 (*)     CO2 20 (*)     Glucose 195 (*)     Calcium 8.0 (*)     Anion Gap 6 (*)     All other components within normal limits    Narrative:     Add on LIPAS per MD ROJASME Epic order [id number]  12:25  12/01/2023    POCT GLUCOSE - Abnormal; Notable for the following components:    POCT Glucose 250 (*)     All other components within normal limits   ISTAT PROCEDURE - Abnormal; Notable for the following components:    POC PO2 28 (*)     POC HCO3 22.7 (*)     All other components within normal limits   POCT GLUCOSE - Abnormal; Notable for the following components:    POCT Glucose 237 (*)     All other components within normal limits   POCT GLUCOSE - Abnormal; Notable for the following components:    POCT Glucose 197 (*)     All other components within normal limits   ISTAT PROCEDURE - Abnormal; Notable for the following components:    POC HCO3 20.8 (*)     POC BE -5 (*)     POC TCO2 22 (*)     All other components within normal limits   SARS-COV2 (COVID) WITH FLU/RSV BY PCR   URINALYSIS MICROSCOPIC    Narrative:     Specimen Source->Urine   LIPASE   LIPASE    Narrative:     Add on LIPAS per MD ROJASME Epic order [id number]  12:25  12/01/2023    POCT URINE PREGNANCY   POCT GLUCOSE MONITORING CONTINUOUS     EKG Readings: (Independently Interpreted)   Initial Reading: No STEMI. Previous EKG: Compared with most recent EKG Previous EKG Date: 5/16/2023. Rhythm: Normal Sinus Rhythm. Heart Rate: 86. Ectopy: No Ectopy. Clinical  Impression: Normal Sinus Rhythm     ECG Results              EKG 12-lead (Final result)  Result time 12/01/23 15:49:51      Final result by Interface, Lab In [facility] (12/01/23 15:49:51)                   Narrative:    Test Reason : R73.9,    Vent. Rate : 086 BPM     Atrial Rate : 086 BPM     P-R Int : 116 ms          QRS Dur : 088 ms      QT Int : 386 ms       P-R-T Axes : 044 090 010 degrees     QTc Int : 461 ms    Normal sinus rhythm  Rightward axis  Borderline Abnormal ECG  When compared with ECG of 16-MAY-2023 09:59,  No significant change was found  Confirmed by Alexys Coronado MD ([id number]) on 12/1/2023 3:49:45 PM    Referred By: [email or web address]   SELF           Confirmed By:Alexys Coronado MD                                  Imaging Results              CT Abdomen Pelvis With IV Contrast NO Oral Contrast (Final result)  Result time 12/01/23 10:30:25      Final result by Rod Soria MD (12/01/23 10:30:25)                   Impression:      1. No definite acute findings identified in the abdomen or pelvis to account for the patient's reported symptoms.  2. Additional details of chronic and incidental findings, as provided in the body of the report.      Electronically signed by: Rod Soria  Date:    12/01/2023  Time:    10:30               Narrative:    EXAMINATION:  CT ABDOMEN PELVIS WITH IV CONTRAST    CLINICAL HISTORY:  Nausea/vomiting;    TECHNIQUE:  Low dose axial images, sagittal and coronal reformations were obtained from the lung bases to the pubic symphysis following the IV administration of 100 mL of Omnipaque 350    COMPARISON:  None.    FINDINGS:  Lower chest: Minor dependent atelectasis.    Liver: Unremarkable.    Gallbladder and bile ducts: Unremarkable. No biliary ductal dilatation.    Pancreas: Unremarkable.    Spleen: Unremarkable.    Adrenals: Unremarkable.    Kidneys: Unremarkable.    Lymph nodes: No abdominal or pelvic lymphadenopathy.    Bowel and mesentery: No definite evidence of bowel  obstruction.  Moderate to large volume colonic stool.Unremarkable appendix.    Abdominal aorta: Unremarkable.    Inferior vena cava: Unremarkable.    Free fluid or free air: No definite free air.  Suspect small volume superior free fluid in the pelvic cul-de-sac, favored physiologic.    Pelvis: IUD in-situ.  Suggested 18 mm left-sided corpus luteum cyst.    Body wall: Unremarkable.    Bones: Unremarkable.                                       X-Ray Chest AP Portable (Final result)  Result time 12/01/23 08:27:52      Final result by Rod Soria MD (12/01/23 08:27:52)                   Impression:      No convincing evidence of acute cardiopulmonary disease.      Electronically signed by: Rod Soria  Date:    12/01/2023  Time:    08:27               Narrative:    EXAMINATION:  XR CHEST AP PORTABLE    CLINICAL HISTORY:  hyperglycemia;    TECHNIQUE:  Single frontal view of the chest was performed.    COMPARISON:  Chest radiograph performed 05/16/2023, 10:30 hours.    FINDINGS:  Monitoring leads overlie the chest.  Cardiomediastinal contours appear to be within normal limits.    Lungs appear essentially clear.    No definite pneumothorax or large volume pleural effusion.    No acute findings in the visualized abdomen.    Osseous and soft tissue structures appear without definite acute abnormality.                                       Medications   sodium chloride 0.9% bolus 1,000 mL 1,000 mL (0 mLs Intravenous Stopped 12/1/23 0748)   ondansetron injection 4 mg (4 mg Intravenous Given 12/1/23 0646)   sodium chloride 0.9% bolus 1,000 mL 1,000 mL (0 mLs Intravenous Stopped 12/1/23 0954)   iohexoL (OMNIPAQUE 350) injection 100 mL (100 mLs Intravenous Given 12/1/23 0953)   sodium chloride 0.9% bolus 1,000 mL 1,000 mL (0 mLs Intravenous Stopped 12/1/23 1248)     Medical Decision Making  28-year-old ej female presenting with multiple episodes of vomiting, abdominal pain.  On arrival, abdomen non  peritonitic.    Differential including, but not limited to:   Pancreatitis, ectopic pregnancy, gastritis, DKA, HHS, viral illness     U preg negative. UA likely contaminant and not c/w UTI. Viral swabs negative. Initial labs with ketonuria, normal pH, elevated BHB, AG 12.  Initiated IVF for hydration given GI loss. Pursued advanced abdominal imaging to assess for emergent intra-abdominal pathology - no acute findings. She self-administered 3 units of insulin as well.   Reviewed patient's labs with her at the bedside. She felt significantly improved with IVF and zofran and was subsequently able to tolerate gatorade and a sugar-free pudding.     Discussed disposition with her at length and offered observation to trend labs and formal endocrinology consult.  Patient's preference is discharge, given her improvement.  Repeat BMP without elevated AG, pH still normal.  Blood sugar improved. We discussed that he presentation/lab abnormalities could be due to early/euglycemic DKA versus viral illness versus dehydration.  I also reviewed her case with endocrinology via phone call, but this was not a formal consult given she elected for discharge. Recommended repeat labs to trend.     She is extremely savvy about her health and care.  She will monitor her glucose very closely for the rest of the day and also has ketone dip-sticks at home to assess.  She promises to return to the ED if she worsens in any way, has continued ketones. Advised f/u with endo as soon as possible and I messaged her endocrinologist. Discussed bland diet.       Advised scheduling appointment with PCP and return precautions given.  The patient understands and agrees with the plan.  All questions answered prior to discharge.         Amount and/or Complexity of Data Reviewed  External Data Reviewed: notes.     Details: Patient seen by endo 9/2023, note reviewed.   Labs: ordered. Decision-making details documented in ED Course.  Radiology:  ordered.  ECG/medicine tests: ordered and independent interpretation performed.    Risk  Prescription drug management.               ED Course as of 12/01/23 1622   Fri Dec 01, 2023   0714 WBC: 9.37  No leukocytosis  [AB]   0714 Glucose(!): 257  Hyperglycemia  [AB]   0714 Beta-Hydroxybutyrate(!): 2.0 [AB]   0714 Anion Gap: 12 [AB]   0714 POC PH: 7.391 [AB]   0839 CXR on my independent interpretation - no acute findings  [AB]   1028 Ketones, UA(!): 3+ [AB]   1108 1. No definite acute findings identified in the abdomen or pelvis to account for the patient's reported symptoms.  2. Additional details of chronic and incidental findings, as provided in the body of the report.         [AB]   1255 Lipase: 11  Normal  [AB]      ED Course User Index  [AB] Twan Allen MD                             Clinical Impression:  Final diagnoses:  [R73.9] Hyperglycemia  [R11.10] Vomiting, unspecified vomiting type, unspecified whether nausea present (Primary)  [R82.4] Ketonuria          ED Disposition Condition    Discharge Stable          ED Prescriptions       Medication Sig Dispense Start Date End Date Auth. Provider    ondansetron (ZOFRAN) 4 MG tablet Take 1 tablet (4 mg total) by mouth every 6 (six) hours as needed for Nausea. 12 tablet 12/1/2023 -- Twan Allen MD          Follow-up Information       Follow up With Specialties Details Why Contact Info    Kamran Zhang MD Family Medicine In 2 days  [address]  [address]  [facility] [address]  [phone]      [facility] Emergency Dept Emergency Medicine  As needed, If symptoms worsen [address] [facility] [id number] [phone]    Jessie Valle MD Endocrinology In 1 week  [address] [facility] [address]  [phone]               Twan Allen MD  12/01/23 2928

## 2023-12-01 NOTE — ED NOTES
Report received from HANNAH Curry. Assume care of pt. Pt resting comfortably and independently repositioned in stretcher with bed locked in lowest position for safety. NAD noted at this time. Respirations even and unlabored and visible chest rise noted.  Patient offered bathroom assistance and denies need at this time. Pt instructed to call if assistance is needed. Pt on continuous cardiac, BP, and O2 monitoring. Call light within reach. Family at bedside. Patient requesting ice chips, MD made aware. Will continue to monitor.

## 2023-12-01 NOTE — ED NOTES
APPEARANCE: awake and alert in NAD.  SKIN: warm, dry and intact. No breakdown or bruising.  MUSCULOSKELETAL: Patient moving all extremities spontaneously, no obvious swelling or deformities noted. Ambulates independently.+ generalized weakness   RESPIRATORY: Denies shortness of breath.Respirations unlabored.   CARDIAC: Denies CP, 2+ distal pulses; no peripheral edema  ABDOMEN: Soft, Tender to palpation in all quadrants. Pt. Nausea/Vomiting.   : voids spontaneously, denies difficulty  Neurologic: AAO x 4; follows commands equal strength in all extremities; denies numbness/tingling. Denies dizziness

## 2023-12-01 NOTE — ED TRIAGE NOTES
Susu Su, a 28 y.o. female presents to the ED w/ complaint of nausea and vomiting. Pt states she has been vomiting since 1 am. Pt took Zofran at 3:30 am, but it didn't help. Pt states she got the flu vaccine 3 days ago. +abdominal pain.    Triage note:  Chief Complaint   Patient presents with    Nausea    Vomiting     Pt c/o N/V x 5 hrs. Hx Type I DM     Review of patient's allergies indicates:  No Known Allergies  Past Medical History:   Diagnosis Date    Diabetes mellitus type I     Family history of high cholesterol     H/o Atorvastin 5mg     Hyperlipidemia     Type I (juvenile type) diabetes mellitus without mention of complication, uncontrolled 08/06/2012

## 2023-12-04 ENCOUNTER — TELEPHONE (OUTPATIENT)
Dept: OBSTETRICS AND GYNECOLOGY | Facility: CLINIC | Age: 28
End: 2023-12-04
Payer: COMMERCIAL

## 2023-12-04 ENCOUNTER — PATIENT MESSAGE (OUTPATIENT)
Dept: ENDOCRINOLOGY | Facility: CLINIC | Age: 28
End: 2023-12-04
Payer: COMMERCIAL

## 2023-12-04 NOTE — TELEPHONE ENCOUNTER
Called patient.  Counseled her that I recommend Dr. Melia Humphrey to discuss egg retrieval. All of her questions were answered

## 2023-12-07 ENCOUNTER — OFFICE VISIT (OUTPATIENT)
Dept: HEMATOLOGY/ONCOLOGY | Facility: CLINIC | Age: 28
End: 2023-12-07
Payer: COMMERCIAL

## 2023-12-07 DIAGNOSIS — Z80.3 FAMILY HISTORY OF BREAST CANCER: ICD-10-CM

## 2023-12-07 DIAGNOSIS — Z71.83 ENCOUNTER FOR NONPROCREATIVE GENETIC COUNSELING: Primary | ICD-10-CM

## 2023-12-07 PROBLEM — Z83.42 FAMILY HISTORY OF HIGH CHOLESTEROL: Status: ACTIVE | Noted: 2023-12-07

## 2023-12-07 PROBLEM — R73.9 HYPERGLYCEMIA: Status: RESOLVED | Noted: 2023-05-16 | Resolved: 2023-12-07

## 2023-12-07 PROCEDURE — 99215 PR OFFICE/OUTPT VISIT, EST, LEVL V, 40-54 MIN: ICD-10-PCS | Mod: 95,,, | Performed by: NURSE PRACTITIONER

## 2023-12-07 PROCEDURE — 3044F HG A1C LEVEL LT 7.0%: CPT | Mod: CPTII,95,, | Performed by: NURSE PRACTITIONER

## 2023-12-07 PROCEDURE — 99215 OFFICE O/P EST HI 40 MIN: CPT | Mod: 95,,, | Performed by: NURSE PRACTITIONER

## 2023-12-07 PROCEDURE — 3044F PR MOST RECENT HEMOGLOBIN A1C LEVEL <7.0%: ICD-10-PCS | Mod: CPTII,95,, | Performed by: NURSE PRACTITIONER

## 2023-12-07 NOTE — PROGRESS NOTES
"Cancer Genetics  Department of Hematology and Oncology  [facility]  [facility]  [facility]    Date of Service:  23  Visit Provider:  Osmany Snowden DNP  Collaborating Physician:  Abena Berrios MD    Patient ID  Name: Susu Su    : 1995    MRN: [id number]      Referring Provider  Jennie Morales MD  [address] [place] [address]    Televisit Information  The patient location is:  [place], [place].    The chief complaint leading to consultation is:  As below.    Visit type: audiovisual.    Face-to-face time with patient:  Approximately 23 minutes.    Approximately 46 minutes in total were spent on this encounter, which includes face-to-face time and non-face-to-face time preparing to see the patient (e.g., review of tests), obtaining and/or reviewing separately obtained history, documenting clinical information in the electronic or other health record, independently interpreting results (not separately reported) and communicating results to the patient/family/caregiver, or care coordination (not separately reported).  Each patient to whom he or she provides medical services by telemedicine is:  (1) informed of the relationship between the physician and patient and the respective role of any other health care provider with respect to management of the patient; and (2) notified that he or she may decline to receive medical services by telemedicine and may withdraw from such care at any time.  Notes:  As below.    SUBJECTIVE      Chief Complaint: Genetic Evaluation    History of Present Illness (HPI):  Susu Su ("Susu"), 28 y.o., assigned female sex at birth, is established with the [facility] but new to me.  She was referred by Dr. Jennie Morales ([facility]) for cancer-genetic risk assessment.    PCP:  Dr. Kamran Zhang  Gyn:  " Dr. Alona Mcghee  Breast:  Dr. Jennie Morales  Derm:  Dr. Holly Jain    Focused Medical History  Genetic testing:  No  Cancer:  No  Colon polyp:  N/A - No history of colonoscopy  Other tumor or pertinent mass/lesion:  No  Pancreatitis:  No  Blood disorder:  No    History reviewed. No pertinent past medical history.    Patient Active Problem List    Diagnosis Date Noted    Family history of high cholesterol 12/07/2023    HLD (hyperlipidemia) 11/01/2022    Diabetes mellitus type I      Focused Surgical History  Reproductive organs:  Intact    Ancestry  Ashkenazi Zoroastrianism:  No    Family Oncologic History  Consanguinity:  No  Hereditary cancer genetic testing in blood relatives:  Yes  Susu's mother, Verna:  [employer] with [employer], May 2018, blood specimen; negative results  Verna's paternal 1st cousin (female) (this relative is indicated on pedigree below):  Susu states this relative underwent genetic testing (around 2017?) - results unknown to Susu, but she thinks they were similar to her mother Auroras and that she would have heard had they been positive  Other than noted, no known history of cancer in relatives depicted in the pedigree or in:  maternal first cousins, maternal extended family, paternal extended family.  Other than noted, no known family history of benign tumor/mass/lesion, pancreatitis, or colon polyps.    ONCOLOGY PEDIGREE  ** If this pedigree appears small/illegible on your screen, expand this note window horizontally. **      Review of Systems  See HPI.       OBJECTIVE     Physical Exam  Significantly limited secondary to the inherent nature of a virtual visit.  Very pleasant patient.  Constitutional      Appearance:  Appears well developed and well nourished. No distress.   Neurological     Mental Status:  Alert and oriented.  Psychiatric         Mood and Affect:  Normal.     Thought Content:  Normal.     Speech:  Normal.     Behavior:  Normal.     Judgment:   "Normal.    Family Genetics Review    Susu sent me her mother's genetics report; below is a portion of it:      ASSESSMENT / PLAN      Susu Su ("Susu"), 28 y.o., assigned female sex at birth, is established with the [facility] but new to me.  She was referred by Dr. Jennie Morales ([facility]) for cancer-genetic risk assessment.      Cancer-genetic risk assessment was conducted.  Pre-test cancer genetic counseling as below was conducted.        From a clinical standpoint, regarding hereditary cancer susceptibility gene testing:  Susu meets current NCCN criteria for such testing based on her maternal aunt Adrianne's diagnosis of triple-negative breast cancer.  Susu's mother, Verna, has undergone a multiple-cancer panel which included all currently recommended NCCN genes for hereditary breast cancer susceptibility, other than the NF1 gene (but Adrianne was diagnosed at a later age than would be expected with an NF1 mutation), with negative results; however, only DNA analysis was performed on these genes for Verna, while RNA analysis was not.  Larger and I discussed today implications of RNA analysis.     Germline cancer genetic testing is the testing of genes associated with cancer, known as cancer susceptibility genes.  Just as these genes are inherited from parents--one copy of each gene from each parent--mutations in these genes can be inherited, as well.  A mutation in a cancer susceptibility gene adversely affects the gene's ability to prevent cancer; therefore, carriers of cancer susceptibility gene mutations may be at increased risk for certain cancers.     Only approximately 5%-10% of cancers are caused by an inherited cancer susceptibility gene mutation; rather, the majority of cancers are sporadic.  Causes of sporadic cancers may include environmental risk factors, lifestyle risk factors, and non-modifiable risk factors.  It is important to " note that members of a family often share not only their genetics but also other risk factors, including environmental and lifestyle risk factors, so cancers can be familial.    Mutations are passed directly from parent to child, not skipping generations.    There can be reproductive implications with a positive genetic test result.  Offered Susu options of proceeding with hereditary cancer susceptibility gene testing at this time versus delaying/declining such.  Susu elects to defer genetic testing for hereditary cancer syndromes at this time and was advised to notify me if she desires to proceed in the future; she has my contact information.         Diagnoses and orders for this encounter:    ICD-10-CM ICD-9-CM   1. Encounter for nonprocreative genetic counseling  Z71.83 V26.33   2. Family history of breast cancer  Z80.3 V16.3     1. Encounter for nonprocreative genetic counseling  - As above     2. Family history of breast cancer  - Ambulatory referral/consult to Genetics:  Completed  - As above       Follow-up:  Follow up in about 3 years (around 12/7/2026) for re-discussion regarding genetic testing and as needed/desired sooner.    Questions were encouraged and answered to the patient's satisfaction, and she verbalized understanding of the information and agreement with the plan.           Osmany Snowden, DNP, APRN, FNP-BC, AOCNP, CGRA  Nurse Practitioner, Cancer Genetics  Department of Hematology and Oncology  [facility]  [facility]        CC:  Dr. Jennie Morales

## 2023-12-07 NOTE — PATIENT INSTRUCTIONS
Information to Follow Your Cancer Genetics Visit    Hollis Cristobal,    It was nice meeting you!  Below is some information to follow your cancer genetics visit.  Please read this information and let me know if you have any questions or concerns.    Health Maintenance / Cancer Risks and Risk Management    Only a small percentage (approximately 5%-10%) of cancers are hereditary, meaning caused by an inherited gene mutation; rather, most cancers are sporadic.  Environmental factors, lifestyle factors, and even factors beyond our control play a significant role in the development of many cancers.  As such, an individual can develop cancer even if they don't have an identifiable hereditary predisposition.  Furthermore, even with negative genetic testing, an individual can still be at increased risk for certain cancers, as they may independently have risk factors for those cancers and/or may share risk factors with their family members affected by cancer.  For these reasons, it is strongly recommended that you ensure that your healthcare providers are aware of and up-to-date on your personal and family history so that your medical management, including cancer screenings, can be based in part off of this information.      The following cancer screenings are currently recommended for you (please note that these recommendations can change based on updates to clinical guidelines and/or with changes to your medical or family history and are therefore only considered accurate as of the date on which this letter was composed; additional and/or alternative screenings may be recommended by your healthcare providers, and recommendations from your healthcare providers directly managing these risks supersede the below recommendations):    Cancer in general:  Annual visit with a primary care provider (PCP) for history review and physical exam and age-appropriate testing.    Gynecologic cancers:  Annual visit with your gynecology provider,  which may include pelvic exam and/or Pap smear/HPV testing.    Breast cancer:  Ongoing breast cancer risk assessment and breast cancer risk-reduction counseling with your breast specialist.  Ongoing breast awareness, meaning you are familiar with your breasts, perform breast self-exams at least monthly, and promptly report changes/concerns to your healthcare provider.  Clinical breast exam by a healthcare provider every 6-12 months as recommended by your breast specialist.  Imaging for breast cancer screening, and breast cancer risk-reduction measures, as recommended by your breast specialist.      Colorectal cancer:  Colorectal cancer screenings as recommended by your PCP or, if you have one, your gastroenterology (GI) provider, starting no later than age 45.  With regard to family history, please let me know if any of the following applies, as such could change colorectal cancer screening recommendations for you:  If you learn moving forward that any blood relative of yours has been diagnosed with colorectal cancer.  If a first-degree relative (i.e., your parent, sibling, or child) has a colorectal polyp history including any of the following characteristics:  Adenoma with high-grade dysplasia  Adenoma or sessile serrated polyp/adenoma 1 cm or larger in size  Villous or tubulovillous adenoma  Traditional serrated adenoma (TSA)  Sessile serrated lesion/polyp/adenoma with any dysplasia  Cumulatively 10 or more polyps    Skin cancer:  Annual total-body skin examination (TBSE) with a dermatology provider.    If you are not established with a healthcare specialist listed above, please let me know so I can refer you.    Some information regarding general cancer risk reduction:  It is recommended to avoid tobacco use; be physically active; maintain a healthy weight; eat a diet rich in fruits, vegetables, and whole grains and low in saturated/trans fat, red meat, and processed meat; limit alcohol consumption (zero is  best); protect against sexually transmitted infections; protect against the sun, and avoid tanning beds; and get regular screenings for cancers as recommended by your healthcare team.    Regarding Your Relatives    Your relatives also may be at increased risk for certain cancers, depending upon their own personal and family history; therefore, it is important that they, too, ensure that their own healthcare providers are aware of and up-to-date on their personal and family history so that their medical management, including cancer screenings, can be based in part off of this information.      Additionally, it is possible for your relatives to have hereditary cancer susceptibility gene mutations even if/when you have negative genetic testing.      Your relatives should speak with a healthcare provider about their cancer risks and whether genetic counseling and potentially testing may be indicated for them.  Anyone interested in pursuing cancer genetic counseling/testing may contact the [facility] at [phone] to schedule an appointment or may visit [email or web address] to locate a genetic specialist near them.    Follow-up    Please continue to follow up with all healthcare providers as directed.    Moving forward, please update me with any changes to--or new information learned about--your personal or family history and with any relative's genetic testing results.      If at any point you wish to pursue hereditary cancer genetic testing, please reach out so I can facilitate that for you; otherwise, please follow up with me in 3 years to revisit the matter, and follow as needed/desired sooner.    In the meantime, please don't hesitate to reach out with any questions or concerns.    Sincerely,        Osmany Snowden, SHIRA, APRN, FNP-BC, AOCNP, CGRA  Nurse Practitioner, Cancer Genetics  Phone:  [phone]

## 2023-12-19 ENCOUNTER — TELEPHONE (OUTPATIENT)
Dept: INTERNAL MEDICINE | Facility: CLINIC | Age: 28
End: 2023-12-19
Payer: COMMERCIAL

## 2023-12-19 NOTE — TELEPHONE ENCOUNTER
----- Message from Genia Kerns sent at 12/19/2023  3:50 PM CST -----  I informed patient that her appointment needs to be reschedule told her the first available is April 23,2024 she was not satisfy with that she stated she's been waiting for 6 months and she is canceling on her. Patient is requesting a sooner appointment please call patient.          ASHLY

## 2024-01-02 ENCOUNTER — OFFICE VISIT (OUTPATIENT)
Dept: URGENT CARE | Facility: CLINIC | Age: 29
End: 2024-01-02
Payer: COMMERCIAL

## 2024-01-02 VITALS
WEIGHT: 155 LBS | TEMPERATURE: 98 F | DIASTOLIC BLOOD PRESSURE: 72 MMHG | HEART RATE: 68 BPM | OXYGEN SATURATION: 98 % | HEIGHT: 70 IN | SYSTOLIC BLOOD PRESSURE: 113 MMHG | RESPIRATION RATE: 18 BRPM | BODY MASS INDEX: 22.19 KG/M2

## 2024-01-02 DIAGNOSIS — R09.81 SINUS CONGESTION: ICD-10-CM

## 2024-01-02 DIAGNOSIS — J10.1 INFLUENZA B: Primary | ICD-10-CM

## 2024-01-02 LAB
CTP QC/QA: YES
POC MOLECULAR INFLUENZA A AGN: NEGATIVE
POC MOLECULAR INFLUENZA B AGN: POSITIVE

## 2024-01-02 PROCEDURE — 87502 INFLUENZA DNA AMP PROBE: CPT | Mod: QW,S$GLB,,

## 2024-01-02 PROCEDURE — 99214 OFFICE O/P EST MOD 30 MIN: CPT | Mod: S$GLB,,,

## 2024-01-02 RX ORDER — OSELTAMIVIR PHOSPHATE 75 MG/1
75 CAPSULE ORAL 2 TIMES DAILY
Qty: 10 CAPSULE | Refills: 0 | Status: SHIPPED | OUTPATIENT
Start: 2024-01-02 | End: 2024-01-23

## 2024-01-02 NOTE — LETTER
January 2, 2024      [facility] - [place] [address] [address] [facility] [address]  Phone: [phone]  Fax: [phone]       Patient: Susu Su   YOB: 1995  Date of Visit: 01/02/2024    To Whom It May Concern:    Mirtha Su  was at [facility] on 01/02/2024. The patient may return to work/school on 1/4/2024 if fever, body aches, chills, fatigue free for 24 hours. If you have any questions or concerns, or if I can be of further assistance, please do not hesitate to contact me.    Sincerely,    Mattie Guerra, NP

## 2024-01-02 NOTE — PATIENT INSTRUCTIONS
You are positive for flu today. Sometimes it may take 1 week to fully recover. You can return to work/school if fever free for 24 hours.     Start tamiflu twice per day and take as directed. Always take with food and water to avoid GI upset. Discontinue if there is concern for dehydration if you cannot tolerate.     When sick with a viral illness, cover your mouth and nose when sneezing and coughing. Wash hands frequently. Sanitize areas at home. Wear a mask in public if you need. Stay home if you are having fevers, chills, body aches, fatigue. Symptoms should resolve in 7-14 days. There is no specific treatment for viral illnesses. We treat symptoms with supportive care. Getting plenty of rest but completing light activities daily, eating a well-balanced diet, drinking plenty of fluids, managing stress levels can aid in faster recovery.       Try a decongestant and corticosteroid nasal spray like flonase for the next few days for sinus relief. Initial: 2 sprays in each nostril once daily for 1 week. Reduce to 1 spray in each nostril once per day. Stop taking if you develop a nose bleed. Nasal saline spray can be used together with flonase to help moisten nostrils. An antihistamine like zyrtec, claritin, allegra can also be helpful for sinus relief and will help dry nasal passages.     Regular (Guaifenesin) Mucinex 1200 mg twice per day for 10 days can help thin secretions for better clearance. Drink plenty of fluids with this.     Honey is a natural cough suppressant.     -If you do NOT have high blood pressure, you may use a decongestant form (D)  of this medication (ie. Claritin- D, zyrtec-D, allegra-D, Mucinex-D) or if you do not take the D form, you can take sudafed (pseudoephedrine) over the counter, which is a powerful decongestant. Do NOT take two decongestant (D) medications at the same time (such as mucinex-D and claritin-D or plain sudafed and claritin D). Dextromethorphan (DM) is a cough suppressant over  the counter (ie. mucinex DM, robitussin, delsym; dayquil/nyquil has DM as well.) Try Afrin for nasal congestion at bedtime. Only use for 3 days as this can cause a rebound of congestion. Try cepacol for sore throat.     Warm tea/warm liquids will help soothe the back of your throat. Warm water salt gurgles can also be helpful. A dry throat will cause pain. Make sure to stay hydrated. Water and pedilyte are the best to drink. Neti pot irrigation, humidifier in your room, avoiding fans, warm compresses to face, eating/drinking hot soups, hot shower before bedtime can help.     The recommended daily fluid intake for women is 2.7 liters (five 16 oz bottles).      Alternate Tylenol and ibuprofen every 4 hours as needed for fever and body aches.  Please take NSAIDs with a full glass of water and food to avoid GI upset.      Please only use over the counter cough and cold medications for 3-5 days at a time to avoid rebound symptoms.     Getting plenty of rest can aid in a faster recovery of illnesses.     Please follow-up with your primary care provider or return to the clinic if not better/worsening symptoms in 1 week.     Report to the ER if you have chest pain, shortness of breath, palpitations.

## 2024-01-02 NOTE — PROGRESS NOTES
"Subjective:      Patient ID: Susu Su is a 28 y.o. female.    Vitals:  height is 5' 10" (1.778 m) and weight is 70.3 kg (155 lb). Her oral temperature is 98.1 °F (36.7 °C). Her blood pressure is 113/72 and her pulse is 68. Her respiration is 18 and oxygen saturation is 98%.     Chief Complaint: Sinus Problem    Patient presents with c.o sore throat and nasal congestion that started last night. No fever, chills, body aches, fatigue. Patient agrees to a flu test. If neg, patient would like a zpack. She had the flu shot this year.         Sinus Problem  This is a new problem. The current episode started yesterday. The problem is unchanged. There has been no fever. Associated symptoms include congestion, sinus pressure and a sore throat. Pertinent negatives include no chills, coughing or shortness of breath. Past treatments include nothing.     Constitution: Negative for chills, fatigue and fever.   HENT:  Positive for congestion, postnasal drip, sinus pressure and sore throat.    Cardiovascular:  Negative for chest pain.   Respiratory:  Negative for cough, shortness of breath and wheezing.    Gastrointestinal:  Negative for abdominal pain, nausea, vomiting and diarrhea.   Musculoskeletal:  Negative for muscle ache.      Objective:     Physical Exam   Constitutional: She is oriented to person, place, and time. She appears well-developed. She is cooperative.  Non-toxic appearance. She does not appear ill. No distress.   HENT:   Head: Normocephalic and atraumatic.   Ears:   Right Ear: Hearing, tympanic membrane, external ear and ear canal normal.   Left Ear: Hearing, tympanic membrane, external ear and ear canal normal.   Nose: Nose normal. No mucosal edema, rhinorrhea, nasal deformity or congestion. No epistaxis. Right sinus exhibits no maxillary sinus tenderness and no frontal sinus tenderness. Left sinus exhibits no maxillary sinus tenderness and no frontal sinus tenderness.   Mouth/Throat: Uvula is " midline, oropharynx is clear and moist and mucous membranes are normal. No trismus in the jaw. Normal dentition. No uvula swelling. No oropharyngeal exudate, posterior oropharyngeal edema or posterior oropharyngeal erythema.   Eyes: Conjunctivae and lids are normal. Pupils are equal, round, and reactive to light. No scleral icterus.   Neck: Trachea normal and phonation normal. Neck supple. No edema present. No erythema present. No neck rigidity present.   Cardiovascular: Normal rate, regular rhythm, normal heart sounds and normal pulses.   Pulmonary/Chest: Effort normal and breath sounds normal. No stridor. No respiratory distress. She has no decreased breath sounds. She has no wheezes. She has no rhonchi. She has no rales.   Abdominal: Normal appearance.   Musculoskeletal: Normal range of motion.         General: No deformity. Normal range of motion.   Lymphadenopathy:     She has cervical adenopathy.   Neurological: She is alert and oriented to person, place, and time. She exhibits normal muscle tone. Coordination normal.   Skin: Skin is warm, dry, intact, not diaphoretic and not pale.   Psychiatric: Her speech is normal and behavior is normal. Judgment and thought content normal.   Nursing note and vitals reviewed.      Assessment:     1. Influenza B    2. Sinus congestion        Plan:     Results for orders placed or performed in visit on 01/02/24   POCT Influenza A/B MOLECULAR   Result Value Ref Range    POC Molecular Influenza A Ag Negative Negative, Not Reported    POC Molecular Influenza B Ag Positive (A) Negative, Not Reported     Acceptable Yes        Influenza B  -     oseltamivir (TAMIFLU) 75 MG capsule; Take 1 capsule (75 mg total) by mouth 2 (two) times daily. for 5 days  Dispense: 10 capsule; Refill: 0    Sinus congestion  -     POCT Influenza A/B MOLECULAR            Discussed results/diagnosis/plan with patient in clinic. Strict precautions given to patient to monitor for worsening  signs and symptoms. Advised to follow up with PCP or specialist.  Explained side effects of medications prescribed with patient and informed him/her to discontinue use if he/she has any side effects and to inform UC or PCP if this occurs. All questions answered. Strict ED verses clinic return precautions stressed and given in depth. Advised if symptoms worsens of fail to improve he/she should go to the Emergency Room. Discharge and follow-up instructions given verbally/printed with the patient who expressed understanding and willingness to comply with my recommendations. Patient voiced understanding and in agreement with current treatment plan. Patient exits the exam room in no acute distress. Conversant and engaged during discharge discussion, verbalized understanding.

## 2024-01-09 LAB
LEFT EYE DM RETINOPATHY: POSITIVE
RIGHT EYE DM RETINOPATHY: POSITIVE

## 2024-01-23 ENCOUNTER — OFFICE VISIT (OUTPATIENT)
Dept: INTERNAL MEDICINE | Facility: CLINIC | Age: 29
End: 2024-01-23
Payer: COMMERCIAL

## 2024-01-23 VITALS — HEART RATE: 58 BPM | BODY MASS INDEX: 23.26 KG/M2 | WEIGHT: 162.5 LBS | HEIGHT: 70 IN | OXYGEN SATURATION: 100 %

## 2024-01-23 DIAGNOSIS — Z83.42 FAMILY HISTORY OF HIGH CHOLESTEROL: ICD-10-CM

## 2024-01-23 DIAGNOSIS — Z00.00 ANNUAL PHYSICAL EXAM: Primary | ICD-10-CM

## 2024-01-23 DIAGNOSIS — E10.3293 TYPE 1 DIABETES MELLITUS WITH MILD NONPROLIFERATIVE DIABETIC RETINOPATHY WITHOUT MACULAR EDEMA, BILATERAL: ICD-10-CM

## 2024-01-23 DIAGNOSIS — E78.00 PURE HYPERCHOLESTEROLEMIA: ICD-10-CM

## 2024-01-23 DIAGNOSIS — E10.9 TYPE 1 DIABETES MELLITUS WITHOUT COMPLICATION: ICD-10-CM

## 2024-01-23 PROCEDURE — 99999 PR PBB SHADOW E&M-EST. PATIENT-LVL IV: CPT | Mod: PBBFAC,,, | Performed by: STUDENT IN AN ORGANIZED HEALTH CARE EDUCATION/TRAINING PROGRAM

## 2024-01-23 PROCEDURE — 3008F BODY MASS INDEX DOCD: CPT | Mod: CPTII,S$GLB,, | Performed by: STUDENT IN AN ORGANIZED HEALTH CARE EDUCATION/TRAINING PROGRAM

## 2024-01-23 PROCEDURE — 1159F MED LIST DOCD IN RCRD: CPT | Mod: CPTII,S$GLB,, | Performed by: STUDENT IN AN ORGANIZED HEALTH CARE EDUCATION/TRAINING PROGRAM

## 2024-01-23 PROCEDURE — 99395 PREV VISIT EST AGE 18-39: CPT | Mod: S$GLB,,, | Performed by: STUDENT IN AN ORGANIZED HEALTH CARE EDUCATION/TRAINING PROGRAM

## 2024-01-23 RX ORDER — ATORVASTATIN CALCIUM 10 MG/1
10 TABLET, FILM COATED ORAL DAILY
Qty: 90 TABLET | Refills: 3 | Status: SHIPPED | OUTPATIENT
Start: 2024-01-23

## 2024-01-23 NOTE — PROGRESS NOTES
"  [facility]    Subjective:       Patient ID: Susu Su is a 28 y.o. female.    Chief Complaint: Annual Exam        History was obtained from the patient and supplemented through chart review.  This patient has been seen by an [facility] internal medicine physician in the past 3 years but is new to me.    She was seeing Dr. Bahena prior.    HPI:    Patient is a 28 y.o. female w/ T1DM who presents for annual    Influenza B early Jan  GI virus early Dec  Laryngitis, slight green mucus, 3 days ago, considering humidifer    DMT1  Prior endo 2012 Dr. Sales, Dr. Valle 9/2023  Then has continued with Dr. Venus Perez from [facility] due to being in [place] for 10 years  A1C 6.9 11/2021  Talks to Dr. Perez every 3 months, reviewed time in range  Has CGM and insulin pump  Appt upcomining Monday with Dr. Michael Ibarra hx of HLD  Saw cardiologist   Lipitor 10 w/ mirena     Exercise 6 times a week    Medical History  Past Medical History:   Diagnosis Date    Diabetes mellitus type I     Hyperlipidemia        Review of Systems   Constitutional:  Negative for fever.   HENT:  Negative for trouble swallowing.    Respiratory:  Negative for shortness of breath.    Cardiovascular:  Negative for chest pain.   Gastrointestinal:  Negative for constipation, diarrhea, nausea and vomiting.   Musculoskeletal:  Negative for gait problem.   Neurological:  Negative for dizziness and seizures.   Psychiatric/Behavioral:  Negative for hallucinations.          Surgical hx, family hx, social hx   Have been reviewed      Current Outpatient Medications:     BAQSIMI 3 mg/actuation Spry, 3 MG NASAL AS DIRECTED IN ONE NOSTRIL DOSE DOES NOT NEED TO BE INHALED, Disp: 2 each, Rfl: 6    BD INSULIN SYRINGE ULT-FINE II 0.3 mL 31 x 5/16" Syrg, USE AS DIRECTED 5 TIMES A DAY FOR INSULIN ADMINISTRATION, Disp: 500 each, Rfl: 1    CONTOUR NEXT TEST STRIPS Strp, SMARTSIG:Via Meter 10 Times Daily PRN, Disp: , Rfl:     DEXCOM G7  Misc, , Disp: , " "Rfl:     DEXCOM G7 SENSOR Marti, CBG: CHANGE EVERY 10 DAYS, Disp: , Rfl:     fluticasone propionate (FLONASE) 50 mcg/actuation nasal spray, 2 sprays (100 mcg total) by Each Nostril route daily as needed for Rhinitis (nasal congestion)., Disp: 15.8 mL, Rfl: 0    glucagon, human recombinant, (GLUCAGON) 1 mg Kit, Inject 1 mL (1 mg total) into the skin as needed (for emergency hypoglycemic episode)., Disp: 3 kit, Rfl: 1    insulin aspart U-100 (NOVOLOG U-100 INSULIN ASPART) 100 unit/mL injection, ACCORDING TO YOUR SLIDING SCALE (Patient taking differently: Up to 50 units daily via insulin pump), Disp: 10 mL, Rfl: 4    insulin glargine, TOUJEO, (TOUJEO SOLOSTAR U-300 INSULIN) 300 unit/mL (1.5 mL) InPn pen, Inject 20u daily in the event of insulin pump failure, Disp: 3 mL, Rfl: 6    insulin glargine,hum.rec.anlog (LANTUS U-100 INSULIN SUBQ), Inject into the skin., Disp: , Rfl:     insulin lispro 100 unit/mL injection, Inject into the skin 3 (three) times daily before meals., Disp: , Rfl:     insulin syringe-needle U-100 1/2 mL 30 x 5/16" Syrg, For four to five times administration of insulin., Disp: 300 each, Rfl: 4    lancets (FREESTYLE LANCETS) Misc, 6 Sticks by Misc.(Non-Drug; Combo Route) route once daily., Disp: 700 each, Rfl: 3    levonorgestreL (MIRENA) 20 mcg/24 hours (7 yrs) 52 mg IUD, 1 each by Intrauterine route once., Disp: , Rfl:     OMNIPOD DASH PODS, GEN 4, Crtg, Inject into the skin., Disp: , Rfl:     ondansetron (ZOFRAN) 4 MG tablet, Take 1 tablet (4 mg total) by mouth every 6 (six) hours as needed for Nausea., Disp: 12 tablet, Rfl: 0    ondansetron (ZOFRAN-ODT) 8 MG TbDL, Take 8 mg by mouth every 8 (eight) hours as needed., Disp: , Rfl:     pen needle, diabetic (BD ULTRA-FINE HUSSAIN PEN NEEDLE) 32 gauge x 5/32" Ndle, 1 each by Misc.(Non-Drug; Combo Route) route once daily., Disp: 50 each, Rfl: 6    SOOLANTRA 1 % Crea, SMARTSIG:sparingly Topical Daily, Disp: , Rfl:     spironolactone (ALDACTONE) 100 MG " "tablet, Take 100 mg by mouth., Disp: , Rfl:     atorvastatin (LIPITOR) 10 MG tablet, Take 1 tablet (10 mg total) by mouth once daily., Disp: 90 tablet, Rfl: 3    olopatadine (PATANOL) 0.1 % ophthalmic solution, Place 1 drop into both eyes 2 (two) times daily as needed for Allergies. (Patient not taking: Reported on 1/23/2024), Disp: 5 mL, Rfl: 0    Objective:        Body mass index is 23.31 kg/m².  Vitals:    01/23/24 1050   BP: (P) 122/80   Pulse: (!) 58   SpO2: 100%   Weight: 73.7 kg (162 lb 7.7 oz)   Height: 5' 10" (1.778 m)   PainSc: 0-No pain     Physical Exam  Vitals and nursing note reviewed.   Constitutional:       General: She is not in acute distress.     Appearance: Normal appearance. She is not ill-appearing.   HENT:      Head: Normocephalic and atraumatic.   Eyes:      General: No scleral icterus.  Cardiovascular:      Rate and Rhythm: Normal rate and regular rhythm.      Heart sounds: Normal heart sounds.   Pulmonary:      Effort: Pulmonary effort is normal.   Abdominal:      General: There is no distension.   Musculoskeletal:         General: No deformity.      Cervical back: Normal range of motion.   Feet:      Right foot:      Protective Sensation: 6 sites tested.  6 sites sensed.      Skin integrity: Skin integrity normal.      Toenail Condition: Right toenails are normal.      Left foot:      Protective Sensation: 6 sites tested.  6 sites sensed.      Skin integrity: Skin integrity normal.      Toenail Condition: Left toenails are normal.   Skin:     General: Skin is warm and dry.   Neurological:      Mental Status: She is alert and oriented to person, place, and time.   Psychiatric:         Behavior: Behavior normal.           Lab Results   Component Value Date    WBC 9.37 12/01/2023    HGB 13.6 12/01/2023    HCT 39.4 12/01/2023     12/01/2023    CHOL 159 10/28/2022    TRIG 69 10/28/2022    HDL 63 10/28/2022    ALT 11 12/01/2023    AST 18 12/01/2023     (L) 12/01/2023    K 3.9 " 12/01/2023     12/01/2023    CREATININE 0.8 12/01/2023    BUN 14 12/01/2023    CO2 20 (L) 12/01/2023    TSH 2.258 10/28/2022    HGBA1C 6.6 (H) 04/24/2023       The ASCVD Risk score (Justin SHEIKH, et al., 2019) failed to calculate for the following reasons:    The 2019 ASCVD risk score is only valid for ages 40 to 79    (Imaging have been independently reviewed)      Assessment:         1. Annual physical exam    2. Type 1 diabetes mellitus without complication    3. Pure hypercholesterolemia    4. Family history of high cholesterol    5. 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    Diagnoses and all orders for this visit:    Annual physical exam  -     Cancel: Comprehensive Metabolic Panel; Future  -     Cancel: Lipid Panel; Future  -     Cancel: TSH; Future  -     Cancel: CBC Auto Differential; Future  -     Hemoglobin A1C; Future    Type 1 diabetes mellitus without complication    Pure hypercholesterolemia  -     atorvastatin (LIPITOR) 10 MG tablet; Take 1 tablet (10 mg total) by mouth once daily.    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, bilateral          Health Maintenance  - Lipids:   - A1C:   - Colon Ca Screen: great grandparent past their 40's  - Immunizations: covid vaccinated and boosted once, needs bivalent and flu    Women's health  - Pap: [place] NILM 8/29/2022  - Mammo: na (family history of at age 60)  - Dexa: na  - Contraception: mirena IUD placed 2017, will need hysteroscopy to remov    DM  - Eye exam: Michael upcoming, mild retinopathy  - Foot exam:  1/23/2024  - Statin if DM: statin   - Microalbum/creatine: ordered  - Ace-I if diabetic and no contraindications: na      Follow up in about 1 year (around 1/23/2025). or sooner prn        All medications were reviewed including  potential side effects and risks/benefits.  Pt was counseled to call back if anything worsens or if questions arise.        Kamran Zhang MD  Family Medicine  [facility]  [facility] [address] [place] [address]  Phone [phone]  Fax [phone]

## 2024-02-02 ENCOUNTER — LAB VISIT (OUTPATIENT)
Dept: LAB | Facility: OTHER | Age: 29
End: 2024-02-02
Attending: STUDENT IN AN ORGANIZED HEALTH CARE EDUCATION/TRAINING PROGRAM
Payer: COMMERCIAL

## 2024-02-02 DIAGNOSIS — Z00.00 ANNUAL PHYSICAL EXAM: ICD-10-CM

## 2024-02-02 DIAGNOSIS — E10.9 TYPE 1 DIABETES MELLITUS WITHOUT COMPLICATION: ICD-10-CM

## 2024-02-02 LAB
CHOLEST SERPL-MCNC: 175 MG/DL (ref 120–199)
CHOLEST/HDLC SERPL: 2.9 {RATIO} (ref 2–5)
ESTIMATED AVG GLUCOSE: 128 MG/DL (ref 68–131)
HBA1C MFR BLD: 6.1 % (ref 4–5.6)
HDLC SERPL-MCNC: 60 MG/DL (ref 40–75)
HDLC SERPL: 34.3 % (ref 20–50)
LDLC SERPL CALC-MCNC: 105 MG/DL (ref 63–159)
NONHDLC SERPL-MCNC: 115 MG/DL
TRIGL SERPL-MCNC: 50 MG/DL (ref 30–150)

## 2024-02-02 PROCEDURE — 83036 HEMOGLOBIN GLYCOSYLATED A1C: CPT | Performed by: STUDENT IN AN ORGANIZED HEALTH CARE EDUCATION/TRAINING PROGRAM

## 2024-02-02 PROCEDURE — 80061 LIPID PANEL: CPT | Performed by: STUDENT IN AN ORGANIZED HEALTH CARE EDUCATION/TRAINING PROGRAM

## 2024-02-02 PROCEDURE — 36415 COLL VENOUS BLD VENIPUNCTURE: CPT | Performed by: STUDENT IN AN ORGANIZED HEALTH CARE EDUCATION/TRAINING PROGRAM

## 2024-02-26 ENCOUNTER — OFFICE VISIT (OUTPATIENT)
Dept: URGENT CARE | Facility: CLINIC | Age: 29
End: 2024-02-26
Payer: COMMERCIAL

## 2024-02-26 VITALS
HEIGHT: 70 IN | BODY MASS INDEX: 23.19 KG/M2 | TEMPERATURE: 98 F | OXYGEN SATURATION: 97 % | DIASTOLIC BLOOD PRESSURE: 76 MMHG | SYSTOLIC BLOOD PRESSURE: 123 MMHG | HEART RATE: 91 BPM | RESPIRATION RATE: 20 BRPM | WEIGHT: 162 LBS

## 2024-02-26 DIAGNOSIS — J10.1 INFLUENZA A: Primary | ICD-10-CM

## 2024-02-26 LAB
CTP QC/QA: YES
POC MOLECULAR INFLUENZA A AGN: POSITIVE
POC MOLECULAR INFLUENZA B AGN: NEGATIVE

## 2024-02-26 PROCEDURE — 87502 INFLUENZA DNA AMP PROBE: CPT | Mod: QW,S$GLB,, | Performed by: NURSE PRACTITIONER

## 2024-02-26 PROCEDURE — 99214 OFFICE O/P EST MOD 30 MIN: CPT | Mod: GT,S$GLB,, | Performed by: NURSE PRACTITIONER

## 2024-02-26 RX ORDER — PROMETHAZINE HYDROCHLORIDE AND DEXTROMETHORPHAN HYDROBROMIDE 6.25; 15 MG/5ML; MG/5ML
5 SYRUP ORAL NIGHTLY PRN
Qty: 180 ML | Refills: 0 | Status: SHIPPED | OUTPATIENT
Start: 2024-02-26 | End: 2024-03-11

## 2024-02-26 RX ORDER — OSELTAMIVIR PHOSPHATE 75 MG/1
75 CAPSULE ORAL 2 TIMES DAILY
Qty: 10 CAPSULE | Refills: 0 | Status: SHIPPED | OUTPATIENT
Start: 2024-02-26 | End: 2024-03-02

## 2024-02-26 NOTE — PROGRESS NOTES
"Subjective:      Patient ID: Susu Su is a 28 y.o. female.    Vitals:  height is 5' 10" (1.778 m) and weight is 73.5 kg (162 lb). Her oral temperature is 98.2 °F (36.8 °C). Her blood pressure is 123/76 and her pulse is 91. Her respiration is 20 and oxygen saturation is 97%.     Chief Complaint: Sore Throat    Patient present with flu like symptoms that started on Saturday patient taken zyrtec and ibuprofen and ny quil .with fever, sore throat, chills , sweats, cough post nasal congestion     Sore Throat   This is a new problem. Episode onset: 3 days. The problem has been gradually worsening. The fever has been present for Less than 1 day. The pain is at a severity of 6/10. Associated symptoms include congestion, coughing, a hoarse voice and shortness of breath. Pertinent negatives include no abdominal pain, diarrhea, drooling, ear discharge, ear pain, headaches, plugged ear sensation, neck pain, stridor, swollen glands, trouble swallowing or vomiting. She has had no exposure to strep or mono. She has tried nothing for the symptoms. The treatment provided no relief.       HENT:  Positive for congestion and sore throat. Negative for ear pain, ear discharge, drooling and trouble swallowing.    Neck: Negative for neck pain.   Respiratory:  Positive for cough and shortness of breath. Negative for stridor.    Gastrointestinal:  Negative for abdominal pain, vomiting and diarrhea.   Neurological:  Negative for headaches.      Past Medical History:   Diagnosis Date    Diabetes mellitus type I     Hyperlipidemia        Objective:     Physical Exam   Constitutional: She is oriented to person, place, and time. No distress. normal  HENT:   Head: Normocephalic and atraumatic.   Ears:   Right Ear: Tympanic membrane, external ear and ear canal normal.   Left Ear: Tympanic membrane, external ear and ear canal normal.   Nose: Nose normal.   Mouth/Throat: Oropharynx is clear and moist. Mucous membranes are dry. Oropharynx " is clear.   Eyes: Conjunctivae are normal. Pupils are equal, round, and reactive to light. No scleral icterus. Extraocular movement intact   Cardiovascular: Normal rate and normal heart sounds.   Pulmonary/Chest: Effort normal and breath sounds normal. No respiratory distress.   Abdominal: Normal appearance.   Musculoskeletal: Normal range of motion.         General: Normal range of motion.   Neurological: She is alert and oriented to person, place, and time.   Skin: Skin is not diaphoretic.   Psychiatric: Her behavior is normal. Judgment and thought content normal.   Vitals reviewed.    The patient location is: [facility]  The chief complaint leading to consultation is: fever, cough, congestion    Visit type: audiovisual    Face to Face time with patient: 15  20 minutes of total time spent on the encounter, which includes face to face time and non-face to face time preparing to see the patient (eg, review of tests), Obtaining and/or reviewing separately obtained history, Documenting clinical information in the electronic or other health record, Independently interpreting results (not separately reported) and communicating results to the patient/family/caregiver, or Care coordination (not separately reported).         Each patient to whom he or she provides medical services by telemedicine is:  (1) informed of the relationship between the physician and patient and the respective role of any other health care provider with respect to management of the patient; and (2) notified that he or she may decline to receive medical services by telemedicine and may withdraw from such care at any time.    Notes:   Results for orders placed or performed in visit on 02/26/24   POCT Influenza A/B Molecular   Result Value Ref Range    POC Molecular Influenza A Ag Positive (A) Negative, Not Reported    POC Molecular Influenza B Ag Negative Negative, Not Reported     Acceptable Yes          Assessment:      1. Influenza A        Plan:       Influenza A  -     POCT Influenza A/B Molecular  -     oseltamivir (TAMIFLU) 75 MG capsule; Take 1 capsule (75 mg total) by mouth 2 (two) times daily. for 5 days  Dispense: 10 capsule; Refill: 0  -     promethazine-dextromethorphan (PROMETHAZINE-DM) 6.25-15 mg/5 mL Syrp; Take 5 mLs by mouth nightly as needed (cough).  Dispense: 180 mL; Refill: 0                  Patient Instructions   Motrin or tylenol as needed for fever/body aches.       You must understand that you've received an Urgent Care treatment only and that you may be released before all your medical problems are known or treated. You, the patient, will arrange for follow up care as instructed.  If your condition worsens we recommend that you receive another evaluation at the emergency room immediately or contact your primary medical clinics after hours call service to discuss your concerns.  Please return here or go to the Emergency Department for any concerns or worsening of condition.

## 2024-02-26 NOTE — PATIENT INSTRUCTIONS
Motrin or tylenol as needed for fever/body aches.       You must understand that you've received an Urgent Care treatment only and that you may be released before all your medical problems are known or treated. You, the patient, will arrange for follow up care as instructed.  If your condition worsens we recommend that you receive another evaluation at the emergency room immediately or contact your primary medical clinics after hours call service to discuss your concerns.  Please return here or go to the Emergency Department for any concerns or worsening of condition.

## 2024-02-26 NOTE — LETTER
February 26, 2024      Urgent Care [address] [address]  Phone: [phone]  Fax: [phone]       Patient: Susu Su   YOB: 1995  Date of Visit: 02/26/2024    To Whom It May Concern:    Mirtha Su  was at [facility] on 02/26/2024. The patient may return to work on 02/28/2024 with no restrictions. If you have any questions or concerns, or if I can be of further assistance, please do not hesitate to contact me.    Sincerely,    Sharla Maciel NP

## 2024-02-27 ENCOUNTER — PATIENT MESSAGE (OUTPATIENT)
Dept: INTERNAL MEDICINE | Facility: CLINIC | Age: 29
End: 2024-02-27
Payer: COMMERCIAL

## 2024-05-10 DIAGNOSIS — M89.8X9 EXOSTOSIS: Primary | ICD-10-CM

## 2024-05-31 ENCOUNTER — PATIENT MESSAGE (OUTPATIENT)
Dept: INTERNAL MEDICINE | Facility: CLINIC | Age: 29
End: 2024-05-31
Payer: COMMERCIAL

## 2024-05-31 NOTE — TELEPHONE ENCOUNTER
Should I schedule a visit with you first before we referral to ENT?             Hi! Around 6 months ago I noticed a bump near my eye bone on my face. I had been to swim practice so thought it was a giggle boris. It didnt go away so I saw my dermatologist who said it was a cyst and injected it. It didnt go away and so I saw a second dermatologist who had me get a CT scan bc she believed it was a bone spur. I got the CT scan result and no bone. She recommended I try an ENT. Is there one you recommend? Thanks!

## 2024-06-03 ENCOUNTER — PATIENT MESSAGE (OUTPATIENT)
Dept: OTOLARYNGOLOGY | Facility: CLINIC | Age: 29
End: 2024-06-03
Payer: COMMERCIAL

## 2024-06-09 ENCOUNTER — PATIENT MESSAGE (OUTPATIENT)
Dept: OBSTETRICS AND GYNECOLOGY | Facility: CLINIC | Age: 29
End: 2024-06-09
Payer: COMMERCIAL

## 2024-06-13 ENCOUNTER — OFFICE VISIT (OUTPATIENT)
Dept: OBSTETRICS AND GYNECOLOGY | Facility: CLINIC | Age: 29
End: 2024-06-13
Payer: COMMERCIAL

## 2024-06-13 ENCOUNTER — OFFICE VISIT (OUTPATIENT)
Dept: INTERNAL MEDICINE | Facility: CLINIC | Age: 29
End: 2024-06-13
Payer: COMMERCIAL

## 2024-06-13 ENCOUNTER — LAB VISIT (OUTPATIENT)
Dept: LAB | Facility: OTHER | Age: 29
End: 2024-06-13
Attending: PHYSICIAN ASSISTANT
Payer: COMMERCIAL

## 2024-06-13 VITALS
HEART RATE: 60 BPM | DIASTOLIC BLOOD PRESSURE: 86 MMHG | BODY MASS INDEX: 23.1 KG/M2 | WEIGHT: 161.38 LBS | HEIGHT: 70 IN | SYSTOLIC BLOOD PRESSURE: 137 MMHG

## 2024-06-13 VITALS
HEIGHT: 70 IN | SYSTOLIC BLOOD PRESSURE: 110 MMHG | HEART RATE: 68 BPM | BODY MASS INDEX: 23.04 KG/M2 | OXYGEN SATURATION: 100 % | WEIGHT: 160.94 LBS | DIASTOLIC BLOOD PRESSURE: 62 MMHG

## 2024-06-13 DIAGNOSIS — Z11.3 ENCOUNTER FOR SCREENING FOR INFECTIONS WITH A PREDOMINANTLY SEXUAL MODE OF TRANSMISSION: Primary | ICD-10-CM

## 2024-06-13 DIAGNOSIS — E10.3293 TYPE 1 DIABETES MELLITUS WITH MILD NONPROLIFERATIVE DIABETIC RETINOPATHY WITHOUT MACULAR EDEMA, BILATERAL: ICD-10-CM

## 2024-06-13 DIAGNOSIS — Z11.3 ENCOUNTER FOR SCREENING FOR INFECTIONS WITH A PREDOMINANTLY SEXUAL MODE OF TRANSMISSION: ICD-10-CM

## 2024-06-13 DIAGNOSIS — L98.9 SKIN ABNORMALITIES: Primary | ICD-10-CM

## 2024-06-13 LAB
HAV IGM SERPL QL IA: NORMAL
HBV CORE IGM SERPL QL IA: NORMAL
HBV SURFACE AG SERPL QL IA: NORMAL
HCV AB SERPL QL IA: NEGATIVE
HIV 1+2 AB+HIV1 P24 AG SERPL QL IA: NEGATIVE
TREPONEMA PALLIDUM IGG+IGM AB [PRESENCE] IN SERUM OR PLASMA BY IMMUNOASSAY: NONREACTIVE

## 2024-06-13 PROCEDURE — 80074 ACUTE HEPATITIS PANEL: CPT | Performed by: PHYSICIAN ASSISTANT

## 2024-06-13 PROCEDURE — 1159F MED LIST DOCD IN RCRD: CPT | Mod: CPTII,S$GLB,, | Performed by: STUDENT IN AN ORGANIZED HEALTH CARE EDUCATION/TRAINING PROGRAM

## 2024-06-13 PROCEDURE — 99213 OFFICE O/P EST LOW 20 MIN: CPT | Mod: S$GLB,,, | Performed by: STUDENT IN AN ORGANIZED HEALTH CARE EDUCATION/TRAINING PROGRAM

## 2024-06-13 PROCEDURE — 3079F DIAST BP 80-89 MM HG: CPT | Mod: CPTII,S$GLB,, | Performed by: PHYSICIAN ASSISTANT

## 2024-06-13 PROCEDURE — 1160F RVW MEDS BY RX/DR IN RCRD: CPT | Mod: CPTII,S$GLB,, | Performed by: PHYSICIAN ASSISTANT

## 2024-06-13 PROCEDURE — 3008F BODY MASS INDEX DOCD: CPT | Mod: CPTII,S$GLB,, | Performed by: PHYSICIAN ASSISTANT

## 2024-06-13 PROCEDURE — 87389 HIV-1 AG W/HIV-1&-2 AB AG IA: CPT | Performed by: PHYSICIAN ASSISTANT

## 2024-06-13 PROCEDURE — 1159F MED LIST DOCD IN RCRD: CPT | Mod: CPTII,S$GLB,, | Performed by: PHYSICIAN ASSISTANT

## 2024-06-13 PROCEDURE — 3044F HG A1C LEVEL LT 7.0%: CPT | Mod: CPTII,S$GLB,, | Performed by: PHYSICIAN ASSISTANT

## 2024-06-13 PROCEDURE — 99213 OFFICE O/P EST LOW 20 MIN: CPT | Mod: S$GLB,,, | Performed by: PHYSICIAN ASSISTANT

## 2024-06-13 PROCEDURE — 3075F SYST BP GE 130 - 139MM HG: CPT | Mod: CPTII,S$GLB,, | Performed by: PHYSICIAN ASSISTANT

## 2024-06-13 PROCEDURE — 86593 SYPHILIS TEST NON-TREP QUANT: CPT | Performed by: PHYSICIAN ASSISTANT

## 2024-06-13 PROCEDURE — 87591 N.GONORRHOEAE DNA AMP PROB: CPT | Performed by: PHYSICIAN ASSISTANT

## 2024-06-13 PROCEDURE — 99999 PR PBB SHADOW E&M-EST. PATIENT-LVL IV: CPT | Mod: PBBFAC,,, | Performed by: PHYSICIAN ASSISTANT

## 2024-06-13 PROCEDURE — 3078F DIAST BP <80 MM HG: CPT | Mod: CPTII,S$GLB,, | Performed by: STUDENT IN AN ORGANIZED HEALTH CARE EDUCATION/TRAINING PROGRAM

## 2024-06-13 PROCEDURE — 3074F SYST BP LT 130 MM HG: CPT | Mod: CPTII,S$GLB,, | Performed by: STUDENT IN AN ORGANIZED HEALTH CARE EDUCATION/TRAINING PROGRAM

## 2024-06-13 PROCEDURE — 3044F HG A1C LEVEL LT 7.0%: CPT | Mod: CPTII,S$GLB,, | Performed by: STUDENT IN AN ORGANIZED HEALTH CARE EDUCATION/TRAINING PROGRAM

## 2024-06-13 PROCEDURE — 36415 COLL VENOUS BLD VENIPUNCTURE: CPT | Performed by: PHYSICIAN ASSISTANT

## 2024-06-13 PROCEDURE — 3008F BODY MASS INDEX DOCD: CPT | Mod: CPTII,S$GLB,, | Performed by: STUDENT IN AN ORGANIZED HEALTH CARE EDUCATION/TRAINING PROGRAM

## 2024-06-13 PROCEDURE — 99999 PR PBB SHADOW E&M-EST. PATIENT-LVL V: CPT | Mod: PBBFAC,,, | Performed by: STUDENT IN AN ORGANIZED HEALTH CARE EDUCATION/TRAINING PROGRAM

## 2024-06-13 NOTE — PROGRESS NOTES
"  [facility]    Subjective:       Patient ID: Susu Su is a 28 y.o. female.    Chief Complaint: bump on eye (Under right eye)      History was obtained from the patient and supplemented through chart review.  This patient is known to me.     HPI:    Patient is a 28 y.o. female who presents for right inferior orbital skin abnormalitity    Right inferior orbital skin mass vs cyst vs skin abnormality, raised mass, flesh colored  Firm, nonmoobile, thin  Did not show on ct Max/face  Pt wishes to pursue despite education of benign appearances    Has already self-referred to ENT, appt next week      Medical History  Past Medical History:   Diagnosis Date    Diabetes mellitus type I     Hyperlipidemia        Review of Systems   Skin:         Skin lesion inferior right eye medial aspect, approx 3 cm inferior to eye         Surgical hx, family hx, social hx   Have been reviewed      Current Outpatient Medications:     atorvastatin (LIPITOR) 10 MG tablet, Take 1 tablet (10 mg total) by mouth once daily., Disp: 90 tablet, Rfl: 3    BAQSIMI 3 mg/actuation Spry, 3 MG NASAL AS DIRECTED IN ONE NOSTRIL DOSE DOES NOT NEED TO BE INHALED, Disp: 2 each, Rfl: 6    BD INSULIN SYRINGE ULT-FINE II 0.3 mL 31 x 5/16" Syrg, USE AS DIRECTED 5 TIMES A DAY FOR INSULIN ADMINISTRATION, Disp: 500 each, Rfl: 1    CONTOUR NEXT TEST STRIPS Strp, SMARTSIG:Via Meter 10 Times Daily PRN, Disp: , Rfl:     DEXCOM G7  Misc, , Disp: , Rfl:     DEXCOM G7 SENSOR Marti, CBG: CHANGE EVERY 10 DAYS, Disp: , Rfl:     fluticasone propionate (FLONASE) 50 mcg/actuation nasal spray, 2 sprays (100 mcg total) by Each Nostril route daily as needed for Rhinitis (nasal congestion)., Disp: 15.8 mL, Rfl: 0    glucagon, human recombinant, (GLUCAGON) 1 mg Kit, Inject 1 mL (1 mg total) into the skin as needed (for emergency hypoglycemic episode)., Disp: 3 kit, Rfl: 1    insulin aspart U-100 (NOVOLOG U-100 INSULIN ASPART) 100 unit/mL injection, " "ACCORDING TO YOUR SLIDING SCALE (Patient taking differently: Up to 50 units daily via insulin pump), Disp: 10 mL, Rfl: 4    insulin glargine, TOUJEO, (TOUJEO SOLOSTAR U-300 INSULIN) 300 unit/mL (1.5 mL) InPn pen, Inject 20u daily in the event of insulin pump failure, Disp: 3 mL, Rfl: 6    insulin glargine,hum.rec.anlog (LANTUS U-100 INSULIN SUBQ), Inject into the skin., Disp: , Rfl:     insulin lispro 100 unit/mL injection, Inject into the skin 3 (three) times daily before meals., Disp: , Rfl:     insulin syringe-needle U-100 1/2 mL 30 x 5/16" Syrg, For four to five times administration of insulin., Disp: 300 each, Rfl: 4    lancets (FREESTYLE LANCETS) Misc, 6 Sticks by Misc.(Non-Drug; Combo Route) route once daily., Disp: 700 each, Rfl: 3    levonorgestreL (MIRENA) 20 mcg/24 hours (7 yrs) 52 mg IUD, 1 each by Intrauterine route once., Disp: , Rfl:     OMNIPOD DASH PODS, GEN 4, Crtg, Inject into the skin., Disp: , Rfl:     ondansetron (ZOFRAN) 4 MG tablet, Take 1 tablet (4 mg total) by mouth every 6 (six) hours as needed for Nausea., Disp: 12 tablet, Rfl: 0    ondansetron (ZOFRAN-ODT) 8 MG TbDL, Take 8 mg by mouth every 8 (eight) hours as needed., Disp: , Rfl:     pen needle, diabetic (BD ULTRA-FINE HUSSAIN PEN NEEDLE) 32 gauge x 5/32" Ndle, 1 each by Misc.(Non-Drug; Combo Route) route once daily., Disp: 50 each, Rfl: 6    SOOLANTRA 1 % Crea, SMARTSIG:sparingly Topical Daily, Disp: , Rfl:     spironolactone (ALDACTONE) 100 MG tablet, Take 100 mg by mouth., Disp: , Rfl:     olopatadine (PATANOL) 0.1 % ophthalmic solution, Place 1 drop into both eyes 2 (two) times daily as needed for Allergies. (Patient not taking: Reported on 2/26/2024), Disp: 5 mL, Rfl: 0    Objective:        Body mass index is 23.09 kg/m².  Vitals:    06/13/24 1328   BP: 110/62   Pulse: 68   SpO2: 100%   Weight: 73 kg (160 lb 15 oz)   Height: 5' 10" (1.778 m)   PainSc: 0-No pain     Physical Exam  Vitals and nursing note reviewed.   Constitutional:  "      General: She is not in acute distress.     Appearance: Normal appearance. She is not ill-appearing.   HENT:      Head: Normocephalic and atraumatic.   Eyes:      General: No scleral icterus.     Comments: No discharge, no erythema  0.3 cm mass circumscribed, no drainage, no fluctuance, no change in skin appearance overlying   Pulmonary:      Effort: Pulmonary effort is normal.   Musculoskeletal:         General: No deformity.   Neurological:      Mental Status: She is alert and oriented to person, place, and time.   Psychiatric:         Behavior: Behavior normal.           Lab Results   Component Value Date    WBC 9.37 12/01/2023    HGB 13.6 12/01/2023    HCT 39.4 12/01/2023     12/01/2023    CHOL 175 02/02/2024    TRIG 50 02/02/2024    HDL 60 02/02/2024    ALT 11 12/01/2023    AST 18 12/01/2023     (L) 12/01/2023    K 3.9 12/01/2023     12/01/2023    CREATININE 0.8 12/01/2023    BUN 14 12/01/2023    CO2 20 (L) 12/01/2023    TSH 2.258 10/28/2022    HGBA1C 6.1 (H) 02/02/2024       The ASCVD Risk score (Pollok DK, et al., 2019) failed to calculate for the following reasons:    The 2019 ASCVD risk score is only valid for ages 40 to 79    (Imaging have been independently reviewed)      Assessment:         1. Skin abnormalities    2. 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        All medications were reviewed including potential side effects and risks/benefits.  Pt was counseled to call back if anything worsens or if questions arise.    Kamran Zhang MD  Family Medicine  [facility]  [facility]  [address]  [place] [address]  Phone [phone]  Fax [phone]

## 2024-06-13 NOTE — PROGRESS NOTES
Subjective:      Susu Su is a 28 y.o. female who presents for STD screening. Recently single and would like screening prior to dating. Denies vaginal discharge or pelvic pain. She has IUD.    No visits with results within 3 Month(s) from this visit.   Latest known visit with results is:   Office Visit on 2024   Component Date Value Ref Range Status    POC Molecular Influenza A Ag 2024 Positive (A)  Negative, Not Reported Final    POC Molecular Influenza B Ag 2024 Negative  Negative, Not Reported Final     Acceptable 2024 Yes   Final       Past Medical History:   Diagnosis Date    Diabetes mellitus type I     Hyperlipidemia      Past Surgical History:   Procedure Laterality Date    WISDOM TOOTH EXTRACTION Bilateral 2014     Social History     Tobacco Use    Smoking status: Never     Passive exposure: Never    Smokeless tobacco: Never   Substance Use Topics    Alcohol use: Yes     Alcohol/week: 4.0 standard drinks of alcohol     Types: 4 Glasses of wine per week     Comment: occ    Drug use: No     Family History   Problem Relation Name Age of Onset    Heart disease Paternal Grandfather Gee 60    Heart disease Father          s/p cardiac ablation    Colonic polyp Mother Verna 57        pt thinks a few & just 1 time    Diabetes Mother Verna     Hyperlipidemia Mother Verna     Karla Parkinson White syndrome Mother Verna     Breast cancer Maternal Aunt Adrianne 57        TNBC (stage IV)    Uterine cancer Paternal Aunt Venus 56    Cancer Other          type unk (liver/kidney?)    Colon cancer Other      Other Other          may have had breast cancer (not triple-negative)    Cervical cancer Neg Hx      Ovarian cancer Neg Hx       OB History    Para Term  AB Living   0 0 0         SAB IAB Ectopic Multiple Live Births                   Current Outpatient Medications:     atorvastatin (LIPITOR) 10 MG tablet, Take 1 tablet (10 mg total) by mouth once  "daily., Disp: 90 tablet, Rfl: 3    BAQSIMI 3 mg/actuation Spry, 3 MG NASAL AS DIRECTED IN ONE NOSTRIL DOSE DOES NOT NEED TO BE INHALED, Disp: 2 each, Rfl: 6    BD INSULIN SYRINGE ULT-FINE II 0.3 mL 31 x 5/16" Syrg, USE AS DIRECTED 5 TIMES A DAY FOR INSULIN ADMINISTRATION, Disp: 500 each, Rfl: 1    CONTOUR NEXT TEST STRIPS Strp, SMARTSIG:Via Meter 10 Times Daily PRN, Disp: , Rfl:     DEXCOM G7  Misc, , Disp: , Rfl:     DEXCOM G7 SENSOR Marti, CBG: CHANGE EVERY 10 DAYS, Disp: , Rfl:     fluticasone propionate (FLONASE) 50 mcg/actuation nasal spray, 2 sprays (100 mcg total) by Each Nostril route daily as needed for Rhinitis (nasal congestion)., Disp: 15.8 mL, Rfl: 0    glucagon, human recombinant, (GLUCAGON) 1 mg Kit, Inject 1 mL (1 mg total) into the skin as needed (for emergency hypoglycemic episode)., Disp: 3 kit, Rfl: 1    insulin aspart U-100 (NOVOLOG U-100 INSULIN ASPART) 100 unit/mL injection, ACCORDING TO YOUR SLIDING SCALE (Patient taking differently: Up to 50 units daily via insulin pump), Disp: 10 mL, Rfl: 4    insulin glargine, TOUJEO, (TOUJEO SOLOSTAR U-300 INSULIN) 300 unit/mL (1.5 mL) InPn pen, Inject 20u daily in the event of insulin pump failure, Disp: 3 mL, Rfl: 6    insulin glargine,hum.rec.anlog (LANTUS U-100 INSULIN SUBQ), Inject into the skin., Disp: , Rfl:     insulin lispro 100 unit/mL injection, Inject into the skin 3 (three) times daily before meals., Disp: , Rfl:     insulin syringe-needle U-100 1/2 mL 30 x 5/16" Syrg, For four to five times administration of insulin., Disp: 300 each, Rfl: 4    lancets (FREESTYLE LANCETS) Misc, 6 Sticks by Misc.(Non-Drug; Combo Route) route once daily., Disp: 700 each, Rfl: 3    levonorgestreL (MIRENA) 20 mcg/24 hours (7 yrs) 52 mg IUD, 1 each by Intrauterine route once., Disp: , Rfl:     olopatadine (PATANOL) 0.1 % ophthalmic solution, Place 1 drop into both eyes 2 (two) times daily as needed for Allergies. (Patient not taking: Reported on 2/26/2024), " "Disp: 5 mL, Rfl: 0    OMNIPOD DASH PODS, GEN 4, Crtg, Inject into the skin., Disp: , Rfl:     ondansetron (ZOFRAN) 4 MG tablet, Take 1 tablet (4 mg total) by mouth every 6 (six) hours as needed for Nausea., Disp: 12 tablet, Rfl: 0    ondansetron (ZOFRAN-ODT) 8 MG TbDL, Take 8 mg by mouth every 8 (eight) hours as needed., Disp: , Rfl:     pen needle, diabetic (BD ULTRA-FINE HUSSAIN PEN NEEDLE) 32 gauge x 5/32" Ndle, 1 each by Misc.(Non-Drug; Combo Route) route once daily., Disp: 50 each, Rfl: 6    SOOLANTRA 1 % Crea, SMARTSIG:sparingly Topical Daily, Disp: , Rfl:     spironolactone (ALDACTONE) 100 MG tablet, Take 100 mg by mouth., Disp: , Rfl:     Review of Systems:  General: No fever, chills, or weight loss.  Chest: No chest pain, shortness of breath, or palpitations.  Breast: No pain, masses, or nipple discharge.  Vulva: No pain, lesions, or itching.  Vagina: No relaxation, itching, discharge, or lesions.  Abdomen: No pain, nausea, vomiting, diarrhea, or constipation.  Urinary: No incontinence, nocturia, frequency, or dysuria.  Extremities:  No leg cramps, edema, or calf pain.  Neurologic: No headaches, dizziness, or visual changes.    Objective:     Vitals:    06/13/24 1053   BP: 137/86   Pulse: 60   Weight: 73.2 kg (161 lb 6.4 oz)   Height: 5' 10" (1.778 m)   PainSc: 0-No pain     Body mass index is 23.16 kg/m².    PHYSICAL EXAM:  APPEARANCE: Well nourished, well developed, in no acute distress.  AFFECT: WNL, alert and oriented x 3  CHEST: Good respiratory effect  ABDOMEN: Soft.  No tenderness or masses.  No hepatosplenomegaly.  No hernias.  PELVIC: Normal external genitalia without lesions.  Normal hair distribution.  Adequate perineal body, normal urethral meatus.  Vagina moist and well rugated without lesions or discharge.  Cervix pink, without lesions, discharge or tenderness. Unable to visualize IUD strings. No significant cystocele or rectocele.  Bimanual exam shows uterus to be normal size, regular, mobile " and nontender.  Adnexa without masses or tenderness.    EXTREMITIES: No edema.    Assessment:      Encounter for screening for infections with a predominantly sexual mode of transmission  -     C. trachomatis/N. gonorrhoeae by AMP DNA [facility]; Vagina  -     HIV 1/2 Ag/Ab (4th Gen); Future; Expected date: 06/13/2024  -     Hepatitis Panel, Acute; Future; Expected date: 06/13/2024  -     Treponema Pallidium Antibodies IgG, IgM; Future; Expected date: 06/13/2024        Plan:   GC/Chlamydia screen  Above STD Labs  She is aware about IUD strings.  Follow up PRN    Instructed patient to call if she experiences any side effects or has any questions.    I spent a total of 20 minutes on the day of the visit.This includes face to face time and non-face to face time preparing to see the patient (eg, review of tests), obtaining and/or reviewing separately obtained history, documenting clinical information in the electronic or other health record, independently interpreting results and communicating results to the patient/family/caregiver, or care coordinator.

## 2024-06-14 LAB
C TRACH DNA SPEC QL NAA+PROBE: NOT DETECTED
N GONORRHOEA DNA SPEC QL NAA+PROBE: NOT DETECTED

## 2024-06-19 ENCOUNTER — OFFICE VISIT (OUTPATIENT)
Dept: OTOLARYNGOLOGY | Facility: CLINIC | Age: 29
End: 2024-06-19
Payer: COMMERCIAL

## 2024-06-19 VITALS
HEART RATE: 75 BPM | BODY MASS INDEX: 22.71 KG/M2 | SYSTOLIC BLOOD PRESSURE: 120 MMHG | DIASTOLIC BLOOD PRESSURE: 75 MMHG | WEIGHT: 158.31 LBS

## 2024-06-19 DIAGNOSIS — L98.9 SKIN ABNORMALITIES: ICD-10-CM

## 2024-06-19 PROCEDURE — 3078F DIAST BP <80 MM HG: CPT | Mod: CPTII,S$GLB,, | Performed by: OTOLARYNGOLOGY

## 2024-06-19 PROCEDURE — 3044F HG A1C LEVEL LT 7.0%: CPT | Mod: CPTII,S$GLB,, | Performed by: OTOLARYNGOLOGY

## 2024-06-19 PROCEDURE — 3008F BODY MASS INDEX DOCD: CPT | Mod: CPTII,S$GLB,, | Performed by: OTOLARYNGOLOGY

## 2024-06-19 PROCEDURE — 1159F MED LIST DOCD IN RCRD: CPT | Mod: CPTII,S$GLB,, | Performed by: OTOLARYNGOLOGY

## 2024-06-19 PROCEDURE — 1160F RVW MEDS BY RX/DR IN RCRD: CPT | Mod: CPTII,S$GLB,, | Performed by: OTOLARYNGOLOGY

## 2024-06-19 PROCEDURE — 99213 OFFICE O/P EST LOW 20 MIN: CPT | Mod: S$GLB,,, | Performed by: OTOLARYNGOLOGY

## 2024-06-19 PROCEDURE — 3074F SYST BP LT 130 MM HG: CPT | Mod: CPTII,S$GLB,, | Performed by: OTOLARYNGOLOGY

## 2024-06-19 PROCEDURE — 99999 PR PBB SHADOW E&M-EST. PATIENT-LVL V: CPT | Mod: PBBFAC,,, | Performed by: OTOLARYNGOLOGY

## 2024-06-19 NOTE — PROGRESS NOTES
is a 28-year-old white female who presents referred by  for a bump under her right eye.  She states that she swims several times per week on a regular basis and wears swim goggles.  Approximately 8 months ago after removing her swim goggles after a swim she noticed a bump under her right eye.  She denied any other trauma or any skin irritation or drainage.  She states that she saw to dermatologist with the 1st injecting the area with steroids.  She states that this seemed to help somewhat though this came back quickly.  The 2nd felt that it was a bone spur.  She presents today for evaluation.  On examination she has a firm area which does feel like bone at her inferior orbital rim at the area where her trigeminal foramen would be.  This does not appear to be tender.  She states that she did have a CT scan done at an outside facility though she does not have this with her today.  I have asked her to obtain the CT disc for me so that I may review this.  She will get this and get it to me for review.  I have discussed digitally massaging this area with topical silicone in the interim.  This could represent a callus formation from bone irritation from constant swim goggle pressure.

## 2024-06-25 ENCOUNTER — PATIENT MESSAGE (OUTPATIENT)
Dept: OTOLARYNGOLOGY | Facility: CLINIC | Age: 29
End: 2024-06-25
Payer: COMMERCIAL

## 2024-06-26 ENCOUNTER — PATIENT MESSAGE (OUTPATIENT)
Dept: OTOLARYNGOLOGY | Facility: CLINIC | Age: 29
End: 2024-06-26
Payer: COMMERCIAL

## 2024-07-02 ENCOUNTER — PATIENT MESSAGE (OUTPATIENT)
Dept: INTERNAL MEDICINE | Facility: CLINIC | Age: 29
End: 2024-07-02
Payer: COMMERCIAL

## 2024-07-02 DIAGNOSIS — Z11.3 SCREENING EXAMINATION FOR STI: Primary | ICD-10-CM

## 2024-07-05 ENCOUNTER — LAB VISIT (OUTPATIENT)
Dept: LAB | Facility: OTHER | Age: 29
End: 2024-07-05
Attending: STUDENT IN AN ORGANIZED HEALTH CARE EDUCATION/TRAINING PROGRAM
Payer: COMMERCIAL

## 2024-07-05 DIAGNOSIS — Z11.3 SCREENING EXAMINATION FOR STI: ICD-10-CM

## 2024-07-05 LAB
HCV AB SERPL QL IA: NEGATIVE
HIV 1+2 AB+HIV1 P24 AG SERPL QL IA: NEGATIVE
TREPONEMA PALLIDUM IGG+IGM AB [PRESENCE] IN SERUM OR PLASMA BY IMMUNOASSAY: NONREACTIVE

## 2024-07-05 PROCEDURE — 87661 TRICHOMONAS VAGINALIS AMPLIF: CPT | Performed by: STUDENT IN AN ORGANIZED HEALTH CARE EDUCATION/TRAINING PROGRAM

## 2024-07-05 PROCEDURE — 86593 SYPHILIS TEST NON-TREP QUANT: CPT | Performed by: STUDENT IN AN ORGANIZED HEALTH CARE EDUCATION/TRAINING PROGRAM

## 2024-07-05 PROCEDURE — 87389 HIV-1 AG W/HIV-1&-2 AB AG IA: CPT | Performed by: STUDENT IN AN ORGANIZED HEALTH CARE EDUCATION/TRAINING PROGRAM

## 2024-07-05 PROCEDURE — 86803 HEPATITIS C AB TEST: CPT | Performed by: STUDENT IN AN ORGANIZED HEALTH CARE EDUCATION/TRAINING PROGRAM

## 2024-07-05 PROCEDURE — 36415 COLL VENOUS BLD VENIPUNCTURE: CPT | Performed by: STUDENT IN AN ORGANIZED HEALTH CARE EDUCATION/TRAINING PROGRAM

## 2024-07-31 ENCOUNTER — PATIENT MESSAGE (OUTPATIENT)
Dept: OBSTETRICS AND GYNECOLOGY | Facility: CLINIC | Age: 29
End: 2024-07-31
Payer: COMMERCIAL

## 2024-07-31 LAB
LEFT EYE DM RETINOPATHY: POSITIVE
RIGHT EYE DM RETINOPATHY: POSITIVE

## 2024-08-12 ENCOUNTER — PATIENT MESSAGE (OUTPATIENT)
Dept: INTERNAL MEDICINE | Facility: CLINIC | Age: 29
End: 2024-08-12
Payer: COMMERCIAL

## 2024-08-22 ENCOUNTER — OFFICE VISIT (OUTPATIENT)
Dept: INTERNAL MEDICINE | Facility: CLINIC | Age: 29
End: 2024-08-22
Payer: COMMERCIAL

## 2024-08-22 VITALS — HEIGHT: 70 IN | BODY MASS INDEX: 22.71 KG/M2

## 2024-08-22 DIAGNOSIS — E78.00 PURE HYPERCHOLESTEROLEMIA: ICD-10-CM

## 2024-08-22 DIAGNOSIS — E10.3293 TYPE 1 DIABETES MELLITUS WITH MILD NONPROLIFERATIVE DIABETIC RETINOPATHY WITHOUT MACULAR EDEMA, BILATERAL: ICD-10-CM

## 2024-08-22 DIAGNOSIS — Z83.42 FAMILY HISTORY OF HIGH CHOLESTEROL: Primary | ICD-10-CM

## 2024-08-22 PROCEDURE — 99214 OFFICE O/P EST MOD 30 MIN: CPT | Mod: 95,,, | Performed by: STUDENT IN AN ORGANIZED HEALTH CARE EDUCATION/TRAINING PROGRAM

## 2024-08-22 PROCEDURE — 3008F BODY MASS INDEX DOCD: CPT | Mod: CPTII,95,, | Performed by: STUDENT IN AN ORGANIZED HEALTH CARE EDUCATION/TRAINING PROGRAM

## 2024-08-22 PROCEDURE — G2211 COMPLEX E/M VISIT ADD ON: HCPCS | Mod: 95,,, | Performed by: STUDENT IN AN ORGANIZED HEALTH CARE EDUCATION/TRAINING PROGRAM

## 2024-08-22 PROCEDURE — 1159F MED LIST DOCD IN RCRD: CPT | Mod: CPTII,95,, | Performed by: STUDENT IN AN ORGANIZED HEALTH CARE EDUCATION/TRAINING PROGRAM

## 2024-08-22 PROCEDURE — 3044F HG A1C LEVEL LT 7.0%: CPT | Mod: CPTII,95,, | Performed by: STUDENT IN AN ORGANIZED HEALTH CARE EDUCATION/TRAINING PROGRAM

## 2024-08-22 RX ORDER — ATORVASTATIN CALCIUM 20 MG/1
20 TABLET, FILM COATED ORAL NIGHTLY
Qty: 90 TABLET | Refills: 3 | Status: SHIPPED | OUTPATIENT
Start: 2024-08-22

## 2024-08-22 NOTE — PROGRESS NOTES
A&Ox4. A-fib RVR, PRN metoprolol given x1 and scheduled metoprolol, amiodarone and digoxin. Dyspnea on exertion. HR in 150's with activity. Oxycodone for pain. Tube feeding started at 1800 running at 50ml, increase to 85ml/hr at 0600. Heparin infusing at 1350units/hr. Hep10a at 2315. Insulin drip infusing, see MAR.  Chest tubes x4 to suction with minimal output. To air leak, no crepitus. Urinal at bedside.    The patient location is: [place]  The chief complaint leading to consultation is: statin dosing    Visit type: audiovisual    Face to Face time with patient: 20  30 minutes of total time spent on the encounter, which includes face to face time and non-face to face time preparing to see the patient (eg, review of tests), Obtaining and/or reviewing separately obtained history, Documenting clinical information in the electronic or other health record, Independently interpreting results (not separately reported) and communicating results to the patient/family/caregiver, or Care coordination (not separately reported).         Each patient to whom he or she provides medical services by telemedicine is:  (1) informed of the relationship between the physician and patient and the respective role of any other health care provider with respect to management of the patient; and (2) notified that he or she may decline to receive medical services by telemedicine and may withdraw from such care at any time.    Notes:         [facility]    Subjective:       Patient ID: Susu Su is a 29 y.o. female.    Chief Complaint: discuss statin      History was obtained from the patient and supplemented through chart review.  This patient is known to me.     HPI:    Patient is a 29 y.o. female who presents for discussion about statin dosing    DMT1  Fam hx of cardiovascular disease, HLD  Wishes to be as proactive as possible  Interested in creasing dose of lipitor from 10 to 20  Has mirena and aware she cannot get pregnant/be pregnant while taking  No AE thus far with 10 mg dose, will increase  R/B discussed    Also will check lipoprotein a, apo a/b    Medical History  Past Medical History:   Diagnosis Date    Diabetes mellitus type I     Hyperlipidemia        Review of Systems   Constitutional:  Negative for activity change and unexpected weight change.   HENT:  Negative for hearing loss, rhinorrhea and  "trouble swallowing.    Eyes:  Negative for discharge and visual disturbance.   Respiratory:  Negative for chest tightness and wheezing.    Cardiovascular:  Negative for chest pain and palpitations.   Gastrointestinal:  Negative for blood in stool, constipation, diarrhea and vomiting.   Endocrine: Negative for polydipsia and polyuria.   Genitourinary:  Negative for difficulty urinating, dysuria, hematuria and menstrual problem.   Musculoskeletal:  Negative for arthralgias, joint swelling and neck pain.   Neurological:  Negative for weakness and headaches.   Psychiatric/Behavioral:  Negative for confusion and dysphoric mood.          Surgical hx, family hx, social hx   Have been reviewed      Current Outpatient Medications:     BAQSIMI 3 mg/actuation Spry, 3 MG NASAL AS DIRECTED IN ONE NOSTRIL DOSE DOES NOT NEED TO BE INHALED, Disp: 2 each, Rfl: 6    BD INSULIN SYRINGE ULT-FINE II 0.3 mL 31 x 5/16" Syrg, USE AS DIRECTED 5 TIMES A DAY FOR INSULIN ADMINISTRATION, Disp: 500 each, Rfl: 1    CONTOUR NEXT TEST STRIPS Strp, SMARTSIG:Via Meter 10 Times Daily PRN, Disp: , Rfl:     DEXCOM G7  Misc, , Disp: , Rfl:     DEXCOM G7 SENSOR Marti, CBG: CHANGE EVERY 10 DAYS, Disp: , Rfl:     fluticasone propionate (FLONASE) 50 mcg/actuation nasal spray, 2 sprays (100 mcg total) by Each Nostril route daily as needed for Rhinitis (nasal congestion)., Disp: 15.8 mL, Rfl: 0    glucagon, human recombinant, (GLUCAGON) 1 mg Kit, Inject 1 mL (1 mg total) into the skin as needed (for emergency hypoglycemic episode)., Disp: 3 kit, Rfl: 1    insulin aspart U-100 (NOVOLOG U-100 INSULIN ASPART) 100 unit/mL injection, ACCORDING TO YOUR SLIDING SCALE (Patient taking differently: Up to 50 units daily via insulin pump), Disp: 10 mL, Rfl: 4    insulin glargine, TOUJEO, (TOUJEO SOLOSTAR U-300 INSULIN) 300 unit/mL (1.5 mL) InPn pen, Inject 20u daily in the event of insulin pump failure, Disp: 3 mL, Rfl: 6    insulin glargine,hum.rec.anlog (LANTUS " "U-100 INSULIN SUBQ), Inject into the skin., Disp: , Rfl:     insulin lispro 100 unit/mL injection, Inject into the skin 3 (three) times daily before meals., Disp: , Rfl:     insulin syringe-needle U-100 1/2 mL 30 x 5/16" Syrg, For four to five times administration of insulin., Disp: 300 each, Rfl: 4    lancets (FREESTYLE LANCETS) Misc, 6 Sticks by Misc.(Non-Drug; Combo Route) route once daily., Disp: 700 each, Rfl: 3    levonorgestreL (MIRENA) 20 mcg/24 hours (7 yrs) 52 mg IUD, 1 each by Intrauterine route once., Disp: , Rfl:     OMNIPOD DASH PODS, GEN 4, Crtg, Inject into the skin., Disp: , Rfl:     ondansetron (ZOFRAN) 4 MG tablet, Take 1 tablet (4 mg total) by mouth every 6 (six) hours as needed for Nausea., Disp: 12 tablet, Rfl: 0    ondansetron (ZOFRAN-ODT) 8 MG TbDL, Take 8 mg by mouth every 8 (eight) hours as needed., Disp: , Rfl:     pen needle, diabetic (BD ULTRA-FINE HUSSAIN PEN NEEDLE) 32 gauge x 5/32" Ndle, 1 each by Misc.(Non-Drug; Combo Route) route once daily., Disp: 50 each, Rfl: 6    SOOLANTRA 1 % Crea, SMARTSIG:sparingly Topical Daily, Disp: , Rfl:     spironolactone (ALDACTONE) 100 MG tablet, Take 100 mg by mouth., Disp: , Rfl:     atorvastatin (LIPITOR) 20 MG tablet, Take 1 tablet (20 mg total) by mouth every evening., Disp: 90 tablet, Rfl: 3    olopatadine (PATANOL) 0.1 % ophthalmic solution, Place 1 drop into both eyes 2 (two) times daily as needed for Allergies. (Patient not taking: Reported on 8/22/2024), Disp: 5 mL, Rfl: 0    Objective:        Body mass index is 22.71 kg/m².  Vitals:    08/22/24 1455   Height: 5' 10" (1.778 m)   PainSc: 0-No pain       Physical Exam  Vitals and nursing note reviewed.   Constitutional:       General: She is not in acute distress.     Appearance: Normal appearance. She is not ill-appearing.   HENT:      Head: Normocephalic and atraumatic.   Eyes:      General: No scleral icterus.  Pulmonary:      Effort: Pulmonary effort is normal.   Musculoskeletal:         " General: No deformity.   Neurological:      Mental Status: She is alert and oriented to person, place, and time.   Psychiatric:         Behavior: Behavior normal.           Lab Results   Component Value Date    WBC 9.37 12/01/2023    HGB 13.6 12/01/2023    HCT 39.4 12/01/2023     12/01/2023    CHOL 175 02/02/2024    TRIG 50 02/02/2024    HDL 60 02/02/2024    ALT 11 12/01/2023    AST 18 12/01/2023     (L) 12/01/2023    K 3.9 12/01/2023     12/01/2023    CREATININE 0.8 12/01/2023    BUN 14 12/01/2023    CO2 20 (L) 12/01/2023    TSH 2.258 10/28/2022    HGBA1C 6.1 (H) 02/02/2024       The ASCVD Risk score (Justin DK, et al., 2019) failed to calculate for the following reasons:    The 2019 ASCVD risk score is only valid for ages 40 to 79    (Imaging have been independently reviewed)      Assessment:         1. Family history of high cholesterol    2. Pure hypercholesterolemia    3. 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    Diagnoses and all orders for this visit:    Family history of high cholesterol    Pure hypercholesterolemia  -     LIPOPROTEIN A (LPA); Future  -     APOLIPOPROTEIN A1 AND B; Future  -     atorvastatin (LIPITOR) 20 MG tablet; Take 1 tablet (20 mg total) by mouth every evening.  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        Visit today is associated with current or anticipated ongoing medical care related to this patient's single serious condition/complex condition of HLD with DMT1 with fam hx of HLD. The patient will return to see me as these issues will be followed longitudinally.    30 min were used in chart  review, evaluation and counseling of patient, documentation and review of results on same day of service     All medications were reviewed including potential side effects and risks/benefits.  Pt was counseled to call back if anything worsens or if questions arise.    Kamran Zhang MD  Family Medicine  [facility]  [address] [facility]  [address] [place] [id number]  Phone [phone]  Fax [phone]

## 2024-08-27 ENCOUNTER — PATIENT MESSAGE (OUTPATIENT)
Dept: INTERNAL MEDICINE | Facility: CLINIC | Age: 29
End: 2024-08-27
Payer: COMMERCIAL

## 2024-08-30 ENCOUNTER — LAB VISIT (OUTPATIENT)
Dept: LAB | Facility: OTHER | Age: 29
End: 2024-08-30
Attending: STUDENT IN AN ORGANIZED HEALTH CARE EDUCATION/TRAINING PROGRAM
Payer: COMMERCIAL

## 2024-08-30 DIAGNOSIS — E78.00 PURE HYPERCHOLESTEROLEMIA: ICD-10-CM

## 2024-08-30 PROCEDURE — 36415 COLL VENOUS BLD VENIPUNCTURE: CPT | Performed by: STUDENT IN AN ORGANIZED HEALTH CARE EDUCATION/TRAINING PROGRAM

## 2024-08-30 PROCEDURE — 83695 ASSAY OF LIPOPROTEIN(A): CPT | Performed by: STUDENT IN AN ORGANIZED HEALTH CARE EDUCATION/TRAINING PROGRAM

## 2024-08-30 PROCEDURE — 82172 ASSAY OF APOLIPOPROTEIN: CPT | Mod: 91 | Performed by: STUDENT IN AN ORGANIZED HEALTH CARE EDUCATION/TRAINING PROGRAM

## 2024-09-03 LAB
APO A-I SERPL-MCNC: 147 MG/DL
APO B SERPL-MCNC: 81 MG/DL
APOLIPOPROTEIN B/A1 RATIO: 0.6
LPA SERPL-MCNC: 6 MG/DL (ref 0–30)

## 2024-09-26 ENCOUNTER — LAB VISIT (OUTPATIENT)
Dept: LAB | Facility: OTHER | Age: 29
End: 2024-09-26
Attending: STUDENT IN AN ORGANIZED HEALTH CARE EDUCATION/TRAINING PROGRAM
Payer: COMMERCIAL

## 2024-09-26 DIAGNOSIS — E10.3293 TYPE 1 DIABETES MELLITUS WITH MILD NONPROLIFERATIVE DIABETIC RETINOPATHY WITHOUT MACULAR EDEMA, BILATERAL: ICD-10-CM

## 2024-09-26 LAB
ESTIMATED AVG GLUCOSE: 134 MG/DL (ref 68–131)
HBA1C MFR BLD: 6.3 % (ref 4–5.6)

## 2024-09-26 PROCEDURE — 83036 HEMOGLOBIN GLYCOSYLATED A1C: CPT | Performed by: STUDENT IN AN ORGANIZED HEALTH CARE EDUCATION/TRAINING PROGRAM

## 2024-09-26 PROCEDURE — 36415 COLL VENOUS BLD VENIPUNCTURE: CPT | Performed by: STUDENT IN AN ORGANIZED HEALTH CARE EDUCATION/TRAINING PROGRAM

## 2024-10-01 ENCOUNTER — PATIENT OUTREACH (OUTPATIENT)
Dept: ADMINISTRATIVE | Facility: HOSPITAL | Age: 29
End: 2024-10-01
Payer: COMMERCIAL

## 2024-10-01 DIAGNOSIS — E10.3293 TYPE 1 DIABETES MELLITUS WITH MILD NONPROLIFERATIVE DIABETIC RETINOPATHY WITHOUT MACULAR EDEMA, BILATERAL: Primary | ICD-10-CM

## 2024-10-07 ENCOUNTER — OFFICE VISIT (OUTPATIENT)
Dept: OBSTETRICS AND GYNECOLOGY | Facility: CLINIC | Age: 29
End: 2024-10-07
Attending: OBSTETRICS & GYNECOLOGY
Payer: COMMERCIAL

## 2024-10-07 VITALS
HEART RATE: 74 BPM | DIASTOLIC BLOOD PRESSURE: 82 MMHG | HEIGHT: 70 IN | BODY MASS INDEX: 22.76 KG/M2 | SYSTOLIC BLOOD PRESSURE: 120 MMHG | WEIGHT: 159 LBS

## 2024-10-07 DIAGNOSIS — Z91.89 AT HIGH RISK FOR BREAST CANCER: ICD-10-CM

## 2024-10-07 DIAGNOSIS — Z01.419 ENCOUNTER FOR GYNECOLOGICAL EXAMINATION WITHOUT ABNORMAL FINDING: ICD-10-CM

## 2024-10-07 DIAGNOSIS — Z12.4 PAP SMEAR FOR CERVICAL CANCER SCREENING: Primary | ICD-10-CM

## 2024-10-07 PROCEDURE — 3044F HG A1C LEVEL LT 7.0%: CPT | Mod: CPTII,S$GLB,, | Performed by: OBSTETRICS & GYNECOLOGY

## 2024-10-07 PROCEDURE — 99999 PR PBB SHADOW E&M-EST. PATIENT-LVL IV: CPT | Mod: PBBFAC,,, | Performed by: OBSTETRICS & GYNECOLOGY

## 2024-10-07 PROCEDURE — 99395 PREV VISIT EST AGE 18-39: CPT | Mod: S$GLB,,, | Performed by: OBSTETRICS & GYNECOLOGY

## 2024-10-07 PROCEDURE — 1159F MED LIST DOCD IN RCRD: CPT | Mod: CPTII,S$GLB,, | Performed by: OBSTETRICS & GYNECOLOGY

## 2024-10-07 PROCEDURE — 3079F DIAST BP 80-89 MM HG: CPT | Mod: CPTII,S$GLB,, | Performed by: OBSTETRICS & GYNECOLOGY

## 2024-10-07 PROCEDURE — 3008F BODY MASS INDEX DOCD: CPT | Mod: CPTII,S$GLB,, | Performed by: OBSTETRICS & GYNECOLOGY

## 2024-10-07 PROCEDURE — 88175 CYTOPATH C/V AUTO FLUID REDO: CPT | Performed by: OBSTETRICS & GYNECOLOGY

## 2024-10-07 PROCEDURE — 3074F SYST BP LT 130 MM HG: CPT | Mod: CPTII,S$GLB,, | Performed by: OBSTETRICS & GYNECOLOGY

## 2024-10-07 PROCEDURE — 87624 HPV HI-RISK TYP POOLED RSLT: CPT | Performed by: OBSTETRICS & GYNECOLOGY

## 2024-10-07 NOTE — PROGRESS NOTES
SUBJECTIVE:   29 y.o. female   for annual routine Pap and checkup. No LMP recorded. Patient has had an implant..  She has no unusual complaints and had diagnostic mammogram last year. TC score was above 20%- was told to repeat screening mammogram in 1 year.        Past Medical History:   Diagnosis Date    Diabetes mellitus type I     Hyperlipidemia      Past Surgical History:   Procedure Laterality Date    WISDOM TOOTH EXTRACTION Bilateral 2014     Social History     Socioeconomic History    Marital status: Single   Tobacco Use    Smoking status: Never     Passive exposure: Never    Smokeless tobacco: Never   Substance and Sexual Activity    Alcohol use: Yes     Alcohol/week: 4.0 standard drinks of alcohol     Types: 4 Glasses of wine per week     Comment: occ    Drug use: No    Sexual activity: Yes     Partners: Male     Birth control/protection: I.U.D.     Comment: MITZI age 22 ( 2018)     Social Drivers of Health     Financial Resource Strain: Low Risk  (2023)    Overall Financial Resource Strain (CARDIA)     Difficulty of Paying Living Expenses: Not very hard   Food Insecurity: No Food Insecurity (2023)    Hunger Vital Sign     Worried About Running Out of Food in the Last Year: Never true     Ran Out of Food in the Last Year: Never true   Transportation Needs: No Transportation Needs (2023)    PRAPARE - Transportation     Lack of Transportation (Medical): No     Lack of Transportation (Non-Medical): No   Physical Activity: Sufficiently Active (2023)    Exercise Vital Sign     Days of Exercise per Week: 6 days     Minutes of Exercise per Session: 50 min   Stress: No Stress Concern Present (2023)    [employer] [place] [employer] - Occupational Stress Questionnaire     Feeling of Stress : Only a little   Housing Stability: Unknown (2023)    Housing Stability Vital Sign     Unable to Pay for Housing in the Last Year: No     Family History   Problem Relation  "Name Age of Onset    Heart disease Paternal Grandfather Gee Tirado    Heart disease Father          s/p cardiac ablation    Colonic polyp Mother Verna Alarcon        pt thinks a few & just 1 time    Diabetes Mother Verna     Hyperlipidemia Mother Verna     Karla Parkinson White syndrome Mother Verna     Breast cancer Maternal Aunt Adrianne 57        TNBC (stage IV)    Uterine cancer Paternal Aunt Venus 56    Cancer Other          type unk (liver/kidney?)    Colon cancer Other      Other Other          may have had breast cancer (not triple-negative)    Cervical cancer Neg Hx      Ovarian cancer Neg Hx       OB History    Para Term  AB Living   0 0 0         SAB IAB Ectopic Multiple Live Births                       Current Outpatient Medications   Medication Sig Dispense Refill    atorvastatin (LIPITOR) 20 MG tablet Take 1 tablet (20 mg total) by mouth every evening. 90 tablet 3    BAQSIMI 3 mg/actuation Spry 3 MG NASAL AS DIRECTED IN ONE NOSTRIL DOSE DOES NOT NEED TO BE INHALED 2 each 6    BD INSULIN SYRINGE ULT-FINE II 0.3 mL 31 x 5/16" Syrg USE AS DIRECTED 5 TIMES A DAY FOR INSULIN ADMINISTRATION 500 each 1    CONTOUR NEXT TEST STRIPS Strp SMARTSIG:Via Meter 10 Times Daily PRN      DEXCOM G7  Misc       DEXCOM G7 SENSOR Marti CBG: CHANGE EVERY 10 DAYS      glucagon, human recombinant, (GLUCAGON) 1 mg Kit Inject 1 mL (1 mg total) into the skin as needed (for emergency hypoglycemic episode). 3 kit 1    insulin aspart U-100 (NOVOLOG U-100 INSULIN ASPART) 100 unit/mL injection ACCORDING TO YOUR SLIDING SCALE 10 mL 4    insulin glargine, TOUJEO, (TOUJEO SOLOSTAR U-300 INSULIN) 300 unit/mL (1.5 mL) InPn pen Inject 20u daily in the event of insulin pump failure 3 mL 6    insulin lispro 100 unit/mL injection Inject into the skin 3 (three) times daily before meals.      insulin syringe-needle U-100 1/2 mL 30 x 5/16" Syrg For four to five times administration of insulin. 300 each 4    lancets (FREESTYLE LANCETS) " "Misc 6 Sticks by Misc.(Non-Drug; Combo Route) route once daily. 700 each 3    levonorgestreL (MIRENA) 20 mcg/24 hours (7 yrs) 52 mg IUD 1 each by Intrauterine route once.      OMNIPOD DASH PODS, GEN 4, Crtg Inject into the skin.      ondansetron (ZOFRAN-ODT) 8 MG TbDL Take 8 mg by mouth every 8 (eight) hours as needed.      pen needle, diabetic (BD ULTRA-FINE HUSSAIN PEN NEEDLE) 32 gauge x 5/32" Ndle 1 each by Misc.(Non-Drug; Combo Route) route once daily. 50 each 6    SOOLANTRA 1 % Crea SMARTSIG:sparingly Topical Daily      spironolactone (ALDACTONE) 100 MG tablet Take 100 mg by mouth.      fluticasone propionate (FLONASE) 50 mcg/actuation nasal spray 2 sprays (100 mcg total) by Each Nostril route daily as needed for Rhinitis (nasal congestion). (Patient not taking: Reported on 10/7/2024) 15.8 mL 0    insulin glargine,hum.rec.anlog (LANTUS U-100 INSULIN SUBQ) Inject into the skin. (Patient not taking: Reported on 10/7/2024)      olopatadine (PATANOL) 0.1 % ophthalmic solution Place 1 drop into both eyes 2 (two) times daily as needed for Allergies. (Patient not taking: Reported on 10/7/2024) 5 mL 0    ondansetron (ZOFRAN) 4 MG tablet Take 1 tablet (4 mg total) by mouth every 6 (six) hours as needed for Nausea. (Patient not taking: Reported on 10/7/2024) 12 tablet 0     No current facility-administered medications for this visit.     Allergies: Patient has no known allergies.     The ASCVD Risk score ([place] DK, et al., 2019) failed to calculate for the following reasons:    The 2019 ASCVD risk score is only valid for ages 40 to 79      ROS:  Constitutional: no weight loss, weight gain, fever, fatigue  Eyes:  No vision changes, glasses/contacts  ENT/Mouth: No ulcers, sinus problems, ears ringing, headache  Cardiovascular: No inability to lie flat, chest pain, exercise intolerance, swelling, heart palpitations  Respiratory: No wheezing, coughing blood, shortness of breath, or cough  Gastrointestinal: No diarrhea, bloody " stool, nausea/vomiting, constipation, gas, hemorrhoids  Genitourinary: No blood in urine, painful urination, urgency of urination, frequency of urination, incomplete emptying, incontinence, abnormal bleeding, painful periods, heavy periods, vaginal discharge, vaginal odor, painful intercourse, sexual problems, bleeding after intercourse.  Musculoskeletal: No muscle weakness  Skin/Breast: No painful breasts, nipple discharge, masses, rash, ulcers  Neurological: No passing out, seizures, numbness, headache  Endocrine: No diabetes, hypothyroid, hyperthyroid, hot flashes, hair loss, abnormal hair growth, acne  Psychiatric: No depression, crying  Hematologic: No bruises, bleeding, swollen lymph nodes, anemia.      Physical Exam:   Constitutional: She is oriented to person, place, and time. She appears well-developed and well-nourished.      Neck: No tracheal deviation present. No thyromegaly present.    Cardiovascular:       Exam reveals no edema.        Pulmonary/Chest: Effort normal. She exhibits no mass, no tenderness, no deformity and no retraction. Right breast exhibits no inverted nipple, no mass, no nipple discharge, no skin change, no tenderness, presence, no bleeding and no swelling. Left breast exhibits no inverted nipple, no mass, no nipple discharge, no skin change, no tenderness, presence, no bleeding and no swelling. Breasts are symmetrical.        Abdominal: Soft. She exhibits no distension and no mass. There is no abdominal tenderness. There is no rebound and no guarding. No hernia. Hernia confirmed negative in the left inguinal area.     Genitourinary:    Vagina and uterus normal.   Rectum:      No external hemorrhoid.   There is no rash, tenderness or lesion on the right labia. There is no rash, tenderness or lesion on the left labia. Cervix is normal. No no adexnal prolapse. Right adnexum displays no mass, no tenderness and no fullness. Left adnexum displays no mass, no tenderness and no fullness. No  vaginal discharge, tenderness, bleeding, rectocele, cystocele or prolapse of vaginal walls in the vagina. Cervix exhibits no motion tenderness, no discharge and no friability. Uterus is not deviated.           Musculoskeletal: Normal range of motion and moves all extremeties. No edema.       Neurological: She is alert and oriented to person, place, and time.    Skin: No rash noted. No erythema. No pallor.    Psychiatric: She has a normal mood and affect. Her behavior is normal. Judgment and thought content normal.     IUD string not visible    ASSESSMENT:   well woman    PLAN:   mammogram  pap smear  Will need hysteroscopic IUD removal- uncertain at this time if she wants another IUD. Current partner has had a vasectomy.. She does not have a cycle with IUD.  She had diagnostic mammogram last year and had elevated TC score- screening mammogram recommended  return annually or prn

## 2024-10-11 LAB
FINAL PATHOLOGIC DIAGNOSIS: NORMAL
Lab: NORMAL

## 2024-10-12 LAB
HPV HR 12 DNA SPEC QL NAA+PROBE: NEGATIVE
HPV16 AG SPEC QL: NEGATIVE
HPV18 DNA SPEC QL NAA+PROBE: NEGATIVE

## 2024-10-14 ENCOUNTER — HOSPITAL ENCOUNTER (OUTPATIENT)
Dept: RADIOLOGY | Facility: OTHER | Age: 29
Discharge: HOME | End: 2024-10-14
Attending: OBSTETRICS & GYNECOLOGY
Payer: COMMERCIAL

## 2024-10-14 DIAGNOSIS — Z12.31 ENCOUNTER FOR SCREENING MAMMOGRAM FOR HIGH-RISK PATIENT: ICD-10-CM

## 2024-10-14 DIAGNOSIS — Z91.89 AT HIGH RISK FOR BREAST CANCER: ICD-10-CM

## 2024-10-14 PROCEDURE — 77067 SCR MAMMO BI INCL CAD: CPT | Mod: TC

## 2024-10-14 PROCEDURE — 77063 BREAST TOMOSYNTHESIS BI: CPT | Mod: 26,,, | Performed by: RADIOLOGY

## 2024-10-14 PROCEDURE — 77063 BREAST TOMOSYNTHESIS BI: CPT | Mod: TC

## 2024-10-14 PROCEDURE — 77067 SCR MAMMO BI INCL CAD: CPT | Mod: 26,,, | Performed by: RADIOLOGY

## 2024-10-15 ENCOUNTER — PATIENT MESSAGE (OUTPATIENT)
Dept: INTERNAL MEDICINE | Facility: CLINIC | Age: 29
End: 2024-10-15
Payer: COMMERCIAL

## 2024-10-18 ENCOUNTER — PATIENT MESSAGE (OUTPATIENT)
Dept: OBSTETRICS AND GYNECOLOGY | Facility: CLINIC | Age: 29
End: 2024-10-18
Payer: COMMERCIAL

## 2024-10-21 ENCOUNTER — TELEPHONE (OUTPATIENT)
Dept: OBSTETRICS AND GYNECOLOGY | Facility: CLINIC | Age: 29
End: 2024-10-21
Payer: COMMERCIAL

## 2024-10-21 ENCOUNTER — PATIENT MESSAGE (OUTPATIENT)
Dept: OBSTETRICS AND GYNECOLOGY | Facility: CLINIC | Age: 29
End: 2024-10-21
Payer: COMMERCIAL

## 2024-10-21 DIAGNOSIS — Z91.89 AT HIGH RISK FOR BREAST CANCER: Primary | ICD-10-CM

## 2024-10-23 ENCOUNTER — LAB VISIT (OUTPATIENT)
Dept: LAB | Facility: OTHER | Age: 29
End: 2024-10-23
Attending: STUDENT IN AN ORGANIZED HEALTH CARE EDUCATION/TRAINING PROGRAM
Payer: COMMERCIAL

## 2024-10-23 DIAGNOSIS — E78.00 PURE HYPERCHOLESTEROLEMIA: ICD-10-CM

## 2024-10-23 LAB
CHOLEST SERPL-MCNC: 170 MG/DL (ref 120–199)
CHOLEST/HDLC SERPL: 2.9 {RATIO} (ref 2–5)
HDLC SERPL-MCNC: 59 MG/DL (ref 40–75)
HDLC SERPL: 34.7 % (ref 20–50)
LDLC SERPL CALC-MCNC: 96.4 MG/DL (ref 63–159)
NONHDLC SERPL-MCNC: 111 MG/DL
TRIGL SERPL-MCNC: 73 MG/DL (ref 30–150)

## 2024-10-23 PROCEDURE — 80061 LIPID PANEL: CPT | Performed by: STUDENT IN AN ORGANIZED HEALTH CARE EDUCATION/TRAINING PROGRAM

## 2024-10-23 PROCEDURE — 36415 COLL VENOUS BLD VENIPUNCTURE: CPT | Performed by: STUDENT IN AN ORGANIZED HEALTH CARE EDUCATION/TRAINING PROGRAM

## 2024-10-28 ENCOUNTER — TELEPHONE (OUTPATIENT)
Dept: SPORTS MEDICINE | Facility: CLINIC | Age: 29
End: 2024-10-28
Payer: COMMERCIAL

## 2024-10-31 ENCOUNTER — OFFICE VISIT (OUTPATIENT)
Dept: SPORTS MEDICINE | Facility: CLINIC | Age: 29
End: 2024-10-31
Payer: COMMERCIAL

## 2024-10-31 ENCOUNTER — PATIENT MESSAGE (OUTPATIENT)
Dept: SPORTS MEDICINE | Facility: CLINIC | Age: 29
End: 2024-10-31

## 2024-10-31 ENCOUNTER — HOSPITAL ENCOUNTER (OUTPATIENT)
Dept: RADIOLOGY | Facility: HOSPITAL | Age: 29
Discharge: HOME | End: 2024-10-31
Attending: ORTHOPAEDIC SURGERY
Payer: COMMERCIAL

## 2024-10-31 VITALS
HEART RATE: 78 BPM | DIASTOLIC BLOOD PRESSURE: 79 MMHG | BODY MASS INDEX: 22.88 KG/M2 | SYSTOLIC BLOOD PRESSURE: 123 MMHG | WEIGHT: 159.81 LBS | HEIGHT: 70 IN

## 2024-10-31 DIAGNOSIS — M25.511 RIGHT SHOULDER PAIN, UNSPECIFIED CHRONICITY: ICD-10-CM

## 2024-10-31 DIAGNOSIS — I77.89 PECTORALIS MINOR SYNDROME: ICD-10-CM

## 2024-10-31 DIAGNOSIS — M25.311 DYSKINESIS OF RIGHT SCAPULA: Primary | ICD-10-CM

## 2024-10-31 PROCEDURE — 3008F BODY MASS INDEX DOCD: CPT | Mod: CPTII,S$GLB,, | Performed by: ORTHOPAEDIC SURGERY

## 2024-10-31 PROCEDURE — 73030 X-RAY EXAM OF SHOULDER: CPT | Mod: TC,RT

## 2024-10-31 PROCEDURE — 3078F DIAST BP <80 MM HG: CPT | Mod: CPTII,S$GLB,, | Performed by: ORTHOPAEDIC SURGERY

## 2024-10-31 PROCEDURE — 3066F NEPHROPATHY DOC TX: CPT | Mod: CPTII,S$GLB,, | Performed by: ORTHOPAEDIC SURGERY

## 2024-10-31 PROCEDURE — 3074F SYST BP LT 130 MM HG: CPT | Mod: CPTII,S$GLB,, | Performed by: ORTHOPAEDIC SURGERY

## 2024-10-31 PROCEDURE — 1159F MED LIST DOCD IN RCRD: CPT | Mod: CPTII,S$GLB,, | Performed by: ORTHOPAEDIC SURGERY

## 2024-10-31 PROCEDURE — 99999 PR PBB SHADOW E&M-EST. PATIENT-LVL IV: CPT | Mod: PBBFAC,,, | Performed by: ORTHOPAEDIC SURGERY

## 2024-10-31 PROCEDURE — 99204 OFFICE O/P NEW MOD 45 MIN: CPT | Mod: S$GLB,,, | Performed by: ORTHOPAEDIC SURGERY

## 2024-10-31 PROCEDURE — 3044F HG A1C LEVEL LT 7.0%: CPT | Mod: CPTII,S$GLB,, | Performed by: ORTHOPAEDIC SURGERY

## 2024-10-31 PROCEDURE — 3061F NEG MICROALBUMINURIA REV: CPT | Mod: CPTII,S$GLB,, | Performed by: ORTHOPAEDIC SURGERY

## 2024-10-31 PROCEDURE — 73030 X-RAY EXAM OF SHOULDER: CPT | Mod: 26,RT,, | Performed by: RADIOLOGY

## 2024-10-31 RX ORDER — CELECOXIB 200 MG/1
200 CAPSULE ORAL DAILY
Qty: 30 CAPSULE | Refills: 1 | Status: SHIPPED | OUTPATIENT
Start: 2024-10-31

## 2024-11-05 ENCOUNTER — CLINICAL SUPPORT (OUTPATIENT)
Dept: REHABILITATION | Facility: HOSPITAL | Age: 29
End: 2024-11-05
Attending: ORTHOPAEDIC SURGERY
Payer: COMMERCIAL

## 2024-11-05 DIAGNOSIS — M25.511 ACUTE PAIN OF RIGHT SHOULDER: Primary | ICD-10-CM

## 2024-11-05 PROCEDURE — 97140 MANUAL THERAPY 1/> REGIONS: CPT | Performed by: PHYSICAL THERAPIST

## 2024-11-05 PROCEDURE — 97161 PT EVAL LOW COMPLEX 20 MIN: CPT | Performed by: PHYSICAL THERAPIST

## 2024-11-05 NOTE — PLAN OF CARE
[facility]   Physical Therapy Initial Evaluation      Name: Susu Su  Clinic Number: [id number]    Therapy Diagnosis:   Encounter Diagnosis   Name Primary?    Acute pain of right shoulder Yes        Physician: LOLA Jimenez MD    Physician Orders: PT Eval and Treat   Medical Diagnosis from Referral:   M25.311 (ICD-10-CM) - Dyskinesis of right scapula   I77.89 (ICD-10-CM) - Pectoralis minor syndrome     Evaluation Date: 11/5/2024  Authorization Period Expiration: 10/31/2025  Plan of Care Expiration: 5/5/2025  Progress Note Due: 2/5/2025  Visit # / Visits authorized: 1/ 1   FOTO: 1/3    Precautions: Standard     Time In: 1000  Time Out: 1100  Total Appointment Time (timed & untimed codes): 60 minutes    Subjective     Date of onset: 1 month  History of current condition - Susu reports: Patient reports right shoulder pain which has existed for the past month. She is a swimmer and notes pain with rotating her head to the right and pulling with the right arm. She also completed [employer] classes and notes some pain with certain movements. She reports no numbness and tingling down the arm. She notes her symptoms are minor today. She works from a standing frame desk but often times just sits for work meetings.   Falls: none    Imaging: xray:     FINDINGS:  No fracture or dislocation.  No bone destruction identified.  No soft tissue calcifications     Impression:     See above    Prior Therapy: L knee - injured pat[place]   Social History: She lives alone  Occupation: Real Estate  Prior Level of Function: Independent   Current Level of Function: Ind with pain swimming, sleeping on R side, and exercise    Pain:  Current 3/10, worst 5/10, best 1/10   Location: right shoulder   Description: Aching and Sharp  Aggravating Factors: Flexing and Lifting  Easing Factors: rest    Patients goals: return to swimming without pain      Medical History:   Past Medical History:   Diagnosis  Date    Diabetes mellitus type I     Hyperlipidemia        Surgical History:   Susu Su  has a past surgical history that includes [place] tooth extraction (Bilateral, 05/01/2014).    Medications:   Susu has a current medication list which includes the following prescription(s): atorvastatin, baqsimi, bd insulin syringe ult-fine ii, celecoxib, contour next test strips, dexcom g7 , dexcom g7 sensor, fluticasone propionate, glucagon (human recombinant), insulin aspart u-100, toujeo solostar u-300 insulin, insulin glargine,hum.rec.anlog, insulin lispro, insulin syringe-needle u-100, lancets, levonorgestrel, olopatadine, omnipod dash pods (gen 4), ondansetron, ondansetron, pen needle, diabetic, soolantra, and spironolactone.    Allergies:   Review of patient's allergies indicates:  No Known Allergies     Objective      Observation: Patient is a 29 y.o. female alert and oriented    Posture: forward head with internally rotated scap    Cervical Range of Motion:    Degrees(%) Pain   Flexion 50 Hinges with poor reversal cervical spine     Extension 80 midline     Right Rotation 80 Pulls on L      Left Rotation 80 Pulls on R      Right Side Bending 75 -   Left Side Bending 75 -   C0-C1 Flex 100 -   C0-C1 Ext 100 -   C0-C1 Sidebending 100 -   C1-2 Rotation Limited on R 50% stretch   C1-3 Rotation 100 -      Shoulder Range of Motion:   Shoulder Left Right   Flexion 180 180   Abduction 180 180   ER 95 90   IR 70 70     Upper Extremity Strength  (R) UE  (L) UE    Shoulder flexion: 5/5 Shoulder flexion: 5/5   Shoulder Abduction: 5/5 Shoulder abduction: 5/5   Shoulder ER 4+/5 Shoulder ER 5/5   Shoulder IR 5/5 Shoulder IR 5/5   Elbow flexion: 5/5 Elbow flexion: 5/5   Elbow extension: 5/5 Elbow extension: 5/5   Wrist flexion: 5/5 Wrist flexion: 5/5   Wrist extension: 5/5 Wrist extension: 5/5    5/5 : 5/5   Lower Trap 4-/5 Lower Trap 4-/5   Middle Trap 4/5 Middle Trap 4/5       Special Tests:  Distraction  +   Spurlings -   Vertebral Artery -   Ricki -   Lateral Flexion Alar Ligament -   Transverse Ligament -   Shoulder Abduction Test -   Quadrant Testing -     Cervical joint mobility: Limited C1-2 on the R and C5-6 on the R      Reflexes:  -Bicep (C5): 2+  -Brachioradialis (C6): 2+  -Tricep (C7): 2+    Thoracic mobility: Limited rotation bilaterally    Palpation: tender over 1st rib      Sensation: intact    Flexibility: short pec minor    Neural tension:   -ULTT Median: +  -ULTT Ulnar: -  -ULTT Radial: -   Intake Outcome Measure for FOTO Shoulder Survey    Therapist reviewed FOTO scores for Susu Su on 11/5/2024.   FOTO documents entered into [employer] - see Media section.    Intake Score: 50%       Treatment     Total Treatment time (time-based codes) separate from Evaluation: 29 minutes     Susu received the treatments listed below:        manual therapy techniques: Manual traction were applied to the: Cervical for 10 minutes, including:    OA Gr II distraction  OA unilateral gapping       neuromuscular re-education activities to improve: Sense, Proprioception, and Posture for 10 minutes. The following activities were included:  DNF training  Mid/low trap training  Serratus slides      Patient Education and Home Exercises     Education provided:   - DNF endurance  - improve right shoulder stabilization  - improve upper cervical mobility/stabilzation    Written Home Exercises Provided: yes. Exercises were reviewed and Susu was able to demonstrate them prior to the end of the session.  Susu demonstrated good understanding of the education provided. See EMR under Patient Instructions for exercises provided during therapy sessions.    Assessment     Susu is a 29 y.o. female referred to outpatient Physical Therapy with a medical diagnosis of pec minor syndrome. Patient presents with limited cervical spine ROM, strength deficits, increased neural tension, difficulty swimming and exercising, and pain with  work.     Patient prognosis is Excellent.   Patient will benefit from skilled outpatient Physical Therapy to address the deficits stated above and in the chart below, provide patient /family education, and to maximize patientt's level of independence.     Plan of care discussed with patient: Yes  Patient's spiritual, cultural and educational needs considered and patient is agreeable to the plan of care and goals as stated below:     Anticipated Barriers for therapy: none    Medical Necessity is demonstrated by the following  History  Co-morbidities and personal factors that may impact the plan of care [x] LOW: no personal factors / co-morbidities  [] MODERATE: 1-2 personal factors / co-morbidities  [] HIGH: 3+ personal factors / co-morbidities    Moderate / High Support Documentation:   Co-morbidities affecting plan of care:     Personal Factors:   no deficits     Examination  Body Structures and Functions, activity limitations and participation restrictions that may impact the plan of care [x] LOW: addressing 1-2 elements  [] MODERATE: 3+ elements  [] HIGH: 4+ elements (please support below)    Moderate / High Support Documentation:      Clinical Presentation [x] LOW: stable  [] MODERATE: Evolving  [] HIGH: Unstable     Decision Making/ Complexity Score: Low       GOALS: Short Term Goals:  4 weeks  1.Report decreased neck pain < / =  2/10  to increase tolerance for return to swimming  2. Increase PROM to full motion without neck pain   3. Increased strength by 1/3 MMT grade in DNF to increase tolerance for ADL and work activities.  4. Pt to tolerate HEP to improve ROM and independence with ADL's    Long Term Goals: 12 weeks  1.Report decreased neck pain  < / =  0 /10  to increase tolerance for return to swimming  2.Increase AROM to full motion without shoulder pain  3.Increase strength to >/= 4/5 in serratus and low trap to increase tolerance for ADL and work activities.  4. Pt goal: return to swimming without neck  or shoulder pain   5. Pt will have improved gcode of CJ (20-40% limited) on FOTO shoulder in order to demonstrate true functional improvement.   Plan     Plan of care Certification: 11/5/2024 to 5/5/2025.    Outpatient Physical Therapy 2 times weekly for 10 weeks to include the following interventions: Cervical/Lumbar Traction, Manual Therapy, Moist Heat/ Ice, Neuromuscular Re-ed, Patient Education, Self Care, Therapeutic Activities, and Therapeutic Exercise.     Willie Gonzales, PT, DPT, OCS, FAAOMPT

## 2024-11-07 ENCOUNTER — OFFICE VISIT (OUTPATIENT)
Dept: OBSTETRICS AND GYNECOLOGY | Facility: CLINIC | Age: 29
End: 2024-11-07
Attending: OBSTETRICS & GYNECOLOGY
Payer: COMMERCIAL

## 2024-11-07 VITALS
HEIGHT: 70 IN | HEART RATE: 85 BPM | WEIGHT: 155 LBS | SYSTOLIC BLOOD PRESSURE: 110 MMHG | DIASTOLIC BLOOD PRESSURE: 78 MMHG | BODY MASS INDEX: 22.19 KG/M2

## 2024-11-07 DIAGNOSIS — E10.3293 TYPE 1 DIABETES MELLITUS WITH MILD NONPROLIFERATIVE DIABETIC RETINOPATHY WITHOUT MACULAR EDEMA, BILATERAL: ICD-10-CM

## 2024-11-07 DIAGNOSIS — N90.89 VULVAR LESION: Primary | ICD-10-CM

## 2024-11-07 PROCEDURE — 3044F HG A1C LEVEL LT 7.0%: CPT | Mod: CPTII,S$GLB,, | Performed by: OBSTETRICS & GYNECOLOGY

## 2024-11-07 PROCEDURE — 3074F SYST BP LT 130 MM HG: CPT | Mod: CPTII,S$GLB,, | Performed by: OBSTETRICS & GYNECOLOGY

## 2024-11-07 PROCEDURE — 3078F DIAST BP <80 MM HG: CPT | Mod: CPTII,S$GLB,, | Performed by: OBSTETRICS & GYNECOLOGY

## 2024-11-07 PROCEDURE — 99999 PR PBB SHADOW E&M-EST. PATIENT-LVL IV: CPT | Mod: PBBFAC,,, | Performed by: OBSTETRICS & GYNECOLOGY

## 2024-11-07 PROCEDURE — 3061F NEG MICROALBUMINURIA REV: CPT | Mod: CPTII,S$GLB,, | Performed by: OBSTETRICS & GYNECOLOGY

## 2024-11-07 PROCEDURE — 3008F BODY MASS INDEX DOCD: CPT | Mod: CPTII,S$GLB,, | Performed by: OBSTETRICS & GYNECOLOGY

## 2024-11-07 PROCEDURE — 99213 OFFICE O/P EST LOW 20 MIN: CPT | Mod: S$GLB,,, | Performed by: OBSTETRICS & GYNECOLOGY

## 2024-11-07 PROCEDURE — 3066F NEPHROPATHY DOC TX: CPT | Mod: CPTII,S$GLB,, | Performed by: OBSTETRICS & GYNECOLOGY

## 2024-11-07 PROCEDURE — 1159F MED LIST DOCD IN RCRD: CPT | Mod: CPTII,S$GLB,, | Performed by: OBSTETRICS & GYNECOLOGY

## 2024-11-07 NOTE — PROGRESS NOTES
SUBJECTIVE:   29 y.o. female   presents today complaining of possible ingrown hair. . No LMP recorded. Patient has had an implant..  She reports noticing it prior to having wax done but notices more now. She denies drainage or redness or pain,..        Past Medical History:   Diagnosis Date    Diabetes mellitus type I     Hyperlipidemia      Past Surgical History:   Procedure Laterality Date    WISDOM TOOTH EXTRACTION Bilateral 2014     Social History     Socioeconomic History    Marital status: Single   Tobacco Use    Smoking status: Never     Passive exposure: Never    Smokeless tobacco: Never   Substance and Sexual Activity    Alcohol use: Yes     Alcohol/week: 4.0 standard drinks of alcohol     Types: 4 Glasses of wine per week     Comment: occ    Drug use: No    Sexual activity: Yes     Partners: Male     Birth control/protection: I.U.D.     Comment: MITZI age 22 ( 2018)     Social Drivers of Health     Financial Resource Strain: Low Risk  (2023)    Overall Financial Resource Strain (CARDIA)     Difficulty of Paying Living Expenses: Not very hard   Food Insecurity: No Food Insecurity (2023)    Hunger Vital Sign     Worried About Running Out of Food in the Last Year: Never true     Ran Out of Food in the Last Year: Never true   Transportation Needs: No Transportation Needs (2023)    PRAPARE - Transportation     Lack of Transportation (Medical): No     Lack of Transportation (Non-Medical): No   Physical Activity: Sufficiently Active (2023)    Exercise Vital Sign     Days of Exercise per Week: 6 days     Minutes of Exercise per Session: 50 min   Stress: No Stress Concern Present (2023)    [employer] [place] [employer] - Occupational Stress Questionnaire     Feeling of Stress : Only a little   Housing Stability: Unknown (2023)    Housing Stability Vital Sign     Unable to Pay for Housing in the Last Year: No     Family History   Problem Relation Name Age of  "Onset    Heart disease Paternal Grandfather Gee Tirado    Heart disease Father          s/p cardiac ablation    Colonic polyp Mother Verna Alarcon        pt thinks a few & just 1 time    Diabetes Mother Verna     Hyperlipidemia Mother Verna     Karla Parkinson White syndrome Mother Verna     Breast cancer Maternal Aunt Adrianne 57        TNBC (stage IV)    Uterine cancer Paternal Aunt Venus 56    Cancer Other          type unk (liver/kidney?)    Colon cancer Other      Other Other          may have had breast cancer (not triple-negative)    Cervical cancer Neg Hx      Ovarian cancer Neg Hx       OB History    Para Term  AB Living   0 0 0         SAB IAB Ectopic Multiple Live Births                       Current Outpatient Medications   Medication Sig Dispense Refill    atorvastatin (LIPITOR) 20 MG tablet Take 1 tablet (20 mg total) by mouth every evening. 90 tablet 3    BAQSIMI 3 mg/actuation Spry 3 MG NASAL AS DIRECTED IN ONE NOSTRIL DOSE DOES NOT NEED TO BE INHALED 2 each 6    BD INSULIN SYRINGE ULT-FINE II 0.3 mL 31 x 5/16" Syrg USE AS DIRECTED 5 TIMES A DAY FOR INSULIN ADMINISTRATION 500 each 1    celecoxib (CELEBREX) 200 MG capsule Take 1 capsule (200 mg total) by mouth once daily. 30 capsule 1    CONTOUR NEXT TEST STRIPS Strp SMARTSIG:Via Meter 10 Times Daily PRN      DEXCOM G7  Misc       DEXCOM G7 SENSOR Marti CBG: CHANGE EVERY 10 DAYS      fluticasone propionate (FLONASE) 50 mcg/actuation nasal spray 2 sprays (100 mcg total) by Each Nostril route daily as needed for Rhinitis (nasal congestion). (Patient not taking: Reported on 10/7/2024) 15.8 mL 0    glucagon, human recombinant, (GLUCAGON) 1 mg Kit Inject 1 mL (1 mg total) into the skin as needed (for emergency hypoglycemic episode). 3 kit 1    insulin aspart U-100 (NOVOLOG U-100 INSULIN ASPART) 100 unit/mL injection ACCORDING TO YOUR SLIDING SCALE 10 mL 4    insulin glargine, TOUJEO, (TOUJEO SOLOSTAR U-300 INSULIN) 300 unit/mL (1.5 mL) InPn pen " "Inject 20u daily in the event of insulin pump failure 3 mL 6    insulin glargine,hum.rec.anlog (LANTUS U-100 INSULIN SUBQ) Inject into the skin. (Patient not taking: Reported on 10/7/2024)      insulin lispro 100 unit/mL injection Inject into the skin 3 (three) times daily before meals.      insulin syringe-needle U-100 1/2 mL 30 x 5/16" Syrg For four to five times administration of insulin. 300 each 4    lancets (FREESTYLE LANCETS) Misc 6 Sticks by Misc.(Non-Drug; Combo Route) route once daily. 700 each 3    levonorgestreL (MIRENA) 20 mcg/24 hours (7 yrs) 52 mg IUD 1 each by Intrauterine route once.      olopatadine (PATANOL) 0.1 % ophthalmic solution Place 1 drop into both eyes 2 (two) times daily as needed for Allergies. (Patient not taking: Reported on 10/7/2024) 5 mL 0    OMNIPOD DASH PODS, GEN 4, Crtg Inject into the skin.      ondansetron (ZOFRAN) 4 MG tablet Take 1 tablet (4 mg total) by mouth every 6 (six) hours as needed for Nausea. (Patient not taking: Reported on 10/7/2024) 12 tablet 0    ondansetron (ZOFRAN-ODT) 8 MG TbDL Take 8 mg by mouth every 8 (eight) hours as needed.      pen needle, diabetic (BD ULTRA-FINE HUSSAIN PEN NEEDLE) 32 gauge x 5/32" Ndle 1 each by Misc.(Non-Drug; Combo Route) route once daily. 50 each 6    SOOLANTRA 1 % Crea SMARTSIG:sparingly Topical Daily      spironolactone (ALDACTONE) 100 MG tablet Take 100 mg by mouth.       No current facility-administered medications for this visit.     Allergies: Patient has no known allergies.     The ASCVD Risk score ([place] DK, et al., 2019) failed to calculate for the following reasons:    The 2019 ASCVD risk score is only valid for ages 40 to 79      ROS:  Constitutional: no weight loss, weight gain, fever, fatigue  Eyes:  No vision changes, glasses/contacts  ENT/Mouth: No ulcers, sinus problems, ears ringing, headache  Cardiovascular: No inability to lie flat, chest pain, exercise intolerance, swelling, heart palpitations  Respiratory: No " wheezing, coughing blood, shortness of breath, or cough  Gastrointestinal: No diarrhea, bloody stool, nausea/vomiting, constipation, gas, hemorrhoids  Genitourinary: No blood in urine, painful urination, urgency of urination, frequency of urination, incomplete emptying, incontinence, abnormal bleeding, painful periods, heavy periods, vaginal discharge, vaginal odor, painful intercourse, sexual problems, bleeding after intercourse.  Musculoskeletal: No muscle weakness  Skin/Breast: No painful breasts, nipple discharge, masses, rash, ulcers  Neurological: No passing out, seizures, numbness, headache  Endocrine: No diabetes, hypothyroid, hyperthyroid, hot flashes, hair loss, abnormal hair growth, acne  Psychiatric: No depression, crying  Hematologic: No bruises, bleeding, swollen lymph nodes, anemia.      Physical Exam    General- well developed, well nourished  Vulva- no masses, no lesions on right, +small nodule on left- no erythema, no crepitus, no exudate, not tender- it is below the surface and most consistent with sebaceous cyst  Vagina-  no masses, no lesions      ASSESSMENT:   1. Vulvar lesion        2. 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 Continue to monitor.If develops erythema or tenderness she knows to reach out.

## 2024-11-10 ENCOUNTER — PATIENT MESSAGE (OUTPATIENT)
Dept: OBSTETRICS AND GYNECOLOGY | Facility: CLINIC | Age: 29
End: 2024-11-10
Payer: COMMERCIAL

## 2024-11-11 ENCOUNTER — PATIENT MESSAGE (OUTPATIENT)
Dept: INTERNAL MEDICINE | Facility: CLINIC | Age: 29
End: 2024-11-11
Payer: COMMERCIAL

## 2024-11-11 NOTE — TELEPHONE ENCOUNTER
Please recommend an evisit to first available clinician since I am out today.  Someone can review a picture.    Thanks

## 2024-11-11 NOTE — TELEPHONE ENCOUNTER
"Sent [employer] message to arrange an Evsitit per Dr. Zhang.     "Please recommend an evisit to first available clinician since I am out today.  Someone can review a picture."     "

## 2024-12-10 ENCOUNTER — OFFICE VISIT (OUTPATIENT)
Dept: HEMATOLOGY/ONCOLOGY | Facility: CLINIC | Age: 29
End: 2024-12-10
Payer: COMMERCIAL

## 2024-12-10 VITALS — BODY MASS INDEX: 22.19 KG/M2 | WEIGHT: 155 LBS | HEIGHT: 70 IN

## 2024-12-10 DIAGNOSIS — Z80.3 FAMILY HISTORY OF BREAST CANCER: ICD-10-CM

## 2024-12-10 DIAGNOSIS — Z91.89 AT HIGH RISK FOR BREAST CANCER: Primary | ICD-10-CM

## 2024-12-10 DIAGNOSIS — R92.343 EXTREMELY DENSE TISSUE OF BOTH BREASTS ON MAMMOGRAPHY: ICD-10-CM

## 2024-12-10 PROCEDURE — 3061F NEG MICROALBUMINURIA REV: CPT | Mod: CPTII,95,, | Performed by: NURSE PRACTITIONER

## 2024-12-10 PROCEDURE — 99215 OFFICE O/P EST HI 40 MIN: CPT | Mod: 95,,, | Performed by: NURSE PRACTITIONER

## 2024-12-10 PROCEDURE — 3066F NEPHROPATHY DOC TX: CPT | Mod: CPTII,95,, | Performed by: NURSE PRACTITIONER

## 2024-12-10 PROCEDURE — 3008F BODY MASS INDEX DOCD: CPT | Mod: CPTII,95,, | Performed by: NURSE PRACTITIONER

## 2024-12-10 PROCEDURE — 3044F HG A1C LEVEL LT 7.0%: CPT | Mod: CPTII,95,, | Performed by: NURSE PRACTITIONER

## 2024-12-10 PROCEDURE — 1159F MED LIST DOCD IN RCRD: CPT | Mod: CPTII,95,, | Performed by: NURSE PRACTITIONER

## 2024-12-10 NOTE — PROGRESS NOTES
"The patient location is: [place]  The chief complaint leading to consultation is: high risk breast     Visit type: audiovisual      45 minutes of total time spent on the encounter, which includes face to face time and non-face to face time preparing to see the patient (eg, review of tests), Obtaining and/or reviewing separately obtained history, Documenting clinical information in the electronic or other health record, Independently interpreting results (not separately reported) and communicating results to the patient/family/caregiver, or Care coordination (not separately reported).         Each patient to whom he or she provides medical services by telemedicine is:  (1) informed of the relationship between the physician and patient and the respective role of any other health care provider with respect to management of the patient; and (2) notified that he or she may decline to receive medical services by telemedicine and may withdraw from such care at any time.    Notes:       High Risk Breast Clinic note      Reason For Consultation:   high-risk for breast cancer      Referring Provider:   Alona Mcghee MD  [address] [facility] [address]  [place] [address]    Records Obtained: Records of the patients history including those obtained from the referring provider were reviewed and summarized in detail.    HPI:   Susu Su is a 29 y.o. who presents for consultation of increased risk of breast cancer.    Mom did not have breast cancer but had double RRM due to her sister having breast cancer and extremely dense breast. Mom was negative for BRCA.     Today, Feels good and no complaints.   No breast concerns.    10/14/2024- negative    High Risk Breast cancer specific history:  - Age: 29 y.o.   - Height/Weight:  Estimated body surface area is 1.86 meters squared as calculated from the following:    Height as of this encounter: 5' 10" (1.778 m).    Weight as of this encounter: 70.3 kg (155 lb).  - Body " mass index is 22.24 kg/m².  - Breast density per BI-RADS:  d - Extremely dense   - Age at menarche:   10  - Number of pregnancies: ; age of first live birth:    - History of breast feeding:    n/a  -Uterus and ovaries intact: Yes  - Menopausal status: premenopausal. Age at menopause, if applicable:      - HRT: No  - Genetic testing:  No but met with genetics and did not recommend. Mom was tested negative  - Personal history of cancer: No  - Previous chest radiation exposure between ages 10-30 years old: No  - Personal history of breast biopsy:  No  - Ashkenazi Mormon Inheritance:  No Other   - Family history of cancer:    Cancer-related family history includes Breast cancer (age of onset: 57) in her maternal aunt; Cancer in an other family member; Uterine cancer (age of onset: 56) in her paternal aunt. There is no history of Cervical cancer or Ovarian cancer.    Social History   Social History     Tobacco Use    Smoking status: Never     Passive exposure: Never    Smokeless tobacco: Never   Substance Use Topics    Alcohol use: Yes     Alcohol/week: 4.0 standard drinks of alcohol     Types: 4 Glasses of wine per week     Comment: occ    Drug use: No     Exercise regimen: 6-7 days a week. Running, swimming and weight training  Patient's occupation: Data Unavailable.   Networked reference to record EEP      SEE CALCULATED RISK BELOW.     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, bilateral    HLD (hyperlipidemia)    Family history of high cholesterol    Acute pain of right shoulder     Family History  Family History   Problem Relation Name Age of Onset    Colonic polyp Mother Verna 57        pt thinks a few & just 1 time    Diabetes Mother Verna     Hyperlipidemia Mother Verna     Karla Parkinson White syndrome  "Mother Verna     Heart disease Father          s/p cardiac ablation    Heart disease Paternal Grandfather Gee Tirado    Breast cancer Maternal Aunt Adrianne 57        TNBC (stage IV)    Uterine cancer Paternal Aunt Venus 56    Cancer Other          type unk (liver/kidney?)    Colon cancer Maternal Great-Grandfather      Cervical cancer Neg Hx      Ovarian cancer Neg Hx       Medications    Current Outpatient Medications:     atorvastatin (LIPITOR) 20 MG tablet, Take 1 tablet (20 mg total) by mouth every evening., Disp: 90 tablet, Rfl: 3    BAQSIMI 3 mg/actuation Spry, 3 MG NASAL AS DIRECTED IN ONE NOSTRIL DOSE DOES NOT NEED TO BE INHALED, Disp: 2 each, Rfl: 6    BD INSULIN SYRINGE ULT-FINE II 0.3 mL 31 x 5/16" Syrg, USE AS DIRECTED 5 TIMES A DAY FOR INSULIN ADMINISTRATION, Disp: 500 each, Rfl: 1    celecoxib (CELEBREX) 200 MG capsule, Take 1 capsule (200 mg total) by mouth once daily., Disp: 30 capsule, Rfl: 1    CONTOUR NEXT TEST STRIPS Strp, SMARTSIG:Via Meter 10 Times Daily PRN, Disp: , Rfl:     DEXCOM G7  Misc, , Disp: , Rfl:     DEXCOM G7 SENSOR Marti, CBG: CHANGE EVERY 10 DAYS, Disp: , Rfl:     glucagon, human recombinant, (GLUCAGON) 1 mg Kit, Inject 1 mL (1 mg total) into the skin as needed (for emergency hypoglycemic episode)., Disp: 3 kit, Rfl: 1    insulin aspart U-100 (NOVOLOG U-100 INSULIN ASPART) 100 unit/mL injection, ACCORDING TO YOUR SLIDING SCALE, Disp: 10 mL, Rfl: 4    insulin glargine, TOUJEO, (TOUJEO SOLOSTAR U-300 INSULIN) 300 unit/mL (1.5 mL) InPn pen, Inject 20u daily in the event of insulin pump failure, Disp: 3 mL, Rfl: 6    insulin lispro 100 unit/mL injection, Inject into the skin 3 (three) times daily before meals., Disp: , Rfl:     insulin syringe-needle U-100 1/2 mL 30 x 5/16" Syrg, For four to five times administration of insulin., Disp: 300 each, Rfl: 4    lancets (FREESTYLE LANCETS) Misc, 6 Sticks by Misc.(Non-Drug; Combo Route) route once daily., Disp: 700 each, Rfl: 3    " "levonorgestreL (MIRENA) 20 mcg/24 hours (7 yrs) 52 mg IUD, 1 each by Intrauterine route once., Disp: , Rfl:     OMNIPOD DASH PODS, GEN 4, Crtg, Inject into the skin., Disp: , Rfl:     ondansetron (ZOFRAN-ODT) 8 MG TbDL, Take 8 mg by mouth every 8 (eight) hours as needed., Disp: , Rfl:     pen needle, diabetic (BD ULTRA-FINE HUSSAIN PEN NEEDLE) 32 gauge x 5/32" Ndle, 1 each by Misc.(Non-Drug; Combo Route) route once daily., Disp: 50 each, Rfl: 6    SOOLANTRA 1 % Crea, SMARTSIG:sparingly Topical Daily, Disp: , Rfl:     spironolactone (ALDACTONE) 100 MG tablet, Take 100 mg by mouth., Disp: , Rfl:     fluticasone propionate (FLONASE) 50 mcg/actuation nasal spray, 2 sprays (100 mcg total) by Each Nostril route daily as needed for Rhinitis (nasal congestion). (Patient not taking: Reported on 12/10/2024), Disp: 15.8 mL, Rfl: 0    insulin glargine,hum.rec.anlog (LANTUS U-100 INSULIN SUBQ), Inject into the skin. (Patient not taking: Reported on 12/10/2024), Disp: , Rfl:     olopatadine (PATANOL) 0.1 % ophthalmic solution, Place 1 drop into both eyes 2 (two) times daily as needed for Allergies. (Patient not taking: Reported on 8/22/2024), Disp: 5 mL, Rfl: 0    ondansetron (ZOFRAN) 4 MG tablet, Take 1 tablet (4 mg total) by mouth every 6 (six) hours as needed for Nausea. (Patient not taking: Reported on 12/10/2024), Disp: 12 tablet, Rfl: 0  Allergies  Review of patient's allergies indicates:  No Known Allergies    Review of Systems       See above   All other systems reviewed and are negative.    Objective:      Vitals:   Vitals:    12/10/24 1557   Weight: 70.3 kg (155 lb)   Height: 5' 10" (1.778 m)     BMI: Body mass index is 22.24 kg/m².   Body surface area is 1.86 meters squared.    Physical Exam  Limited as virtual  Appears comfortable.       Laboratory Data: reviewed most recent   Imaging: reviewed most recent      General Education discussed:      1 in 11 women diagnosed with breast cancer in the [place] were under " 46yo.       Individuals should undergo breast cancer risk assessment by age 25 years and be counseled regarding potential benefits, risks, and limitations of breast screening in the context of their risk stratification.       1. General education: (not patient specific)    Risk factors associated with breast cancer are categorized into 2 groups: Modifiable and Non-modifiable. Modifiable risk factors include use of hormones, alcohol, smoking, diet and exercise. Non-modifiable risk factors include breast density, genetics, chest radiation, previous pregnancies, age of first period, and age of menopause.     Factors associated with greater breast cancer risk: (this list is not patient specific)  -Increasing age The risk of breast cancer increases with older age.  -Female sex  -White race (In the [place], the highest breast cancer risk occurs among White women, although breast cancer remains the most common cancer among women of every major ethnic/racial group )  -Weight and body fat in postmenopausal women -Obesity (defined as body mass index [BMI] >=30 kg/m2) is associated with an overall increase  in morbidity and mortality. However, the risk of breast cancer associated with BMI differs by menopausal status.   ?Postmenopausal women - A higher BMI and/or perimenopausal weight gain have been consistently associated with a higher risk of breast cancer among postmenopausal women. The association between a higher BMI and postmenopausal breast cancer risk may be mediated by higher estrogen levels resulting from the peripheral conversion of estrogen precursors (from adipose tissue) to estrogen   -Tall stature -women who were >175 cm (69 inches) tall were 20 percent more likely to develop breast cancer than those <160 cm (63 inches) tall.  -Benign breast disease  Benign breast disease represents a spectrum of disorders that come to clinical attention because of patient symptoms (such as breast pain), palpable lesions  or other findings on physical examination, or as imaging abnormalities. Following establishment of a benign diagnosis, treatment in general is aimed at symptomatic relief and patient education.  Some benign breast diseases, such as atypical hyperplasia or lobular carcinoma in situ, confer an increase in the patient's future risk of developing breast cancer and should lead to counseling about screening recommendations and risk reduction strategies. These lesions are considered as risk markers, rather than as premalignant lesions, because those cancers that subsequently develop are not necessarily in the area of the atypia and may occur in the contralateral breast.  Benign epithelial breast lesions can be classified histologically into three categories: nonproliferative, proliferative without atypia, and atypical hyperplasia. The categorization is based upon the degree of cellular proliferation and atypia.  -Dense breast tissue -- The density of breast tissue reflects the relative amount of glandular and connective tissue (parenchyma) to adipose tissue. Women with mammographically dense breast tissue, generally defined as dense tissue comprising >=75 percent of the breast, have a four to five times higher breast cancer risk compared with women of similar age with less or no dense tissue. Although breast density is a largely inherited trait, other factors can influence density. For example, lower density has been associated with higher levels of physical activity  and with a low-fat, high-carbohydrate diet. In postmenopausal women, estrogen and progesterone increase breast density  while the ER antagonist tamoxifen decreases breast density.  Despite the association of exogenous hormones with breast density, breast density is not strongly correlated with endogenous hormone levels. Breast tend to become more fatty with age.   Dense breasts -  occurring in an estimated 50% of women in their 40s, 40% in their 50s, and 25%  of women older than 60. Density can make it more difficult to detect breast cancer and increases breast cancer risk, both of which suggest that additional imaging can improve early diagnosis of the disease.   -Bone mineral density -In multiple studies, women with higher bone density have a higher breast cancer risk  -Hormonal factors:   With regard to Breast cancer -- combined oral contraceptives (BRENDA)  appear to be associated with little to no increased risk of breast cancer based on observational data. Any effect appears to be temporary and limited to current or recent (within five to seven years) BRENDA use. I will put a link here for  review:  Combined estrogen-progestin contraception: Side effects and health concerns - UpToDate     HRT.:  Of note, IVF does not appear to increase the long-term risk of breast cancer, even in women with BRCA 1 and 2 mutations.     In the Women's Health Initiative (WHI), the risk of invasive breast cancer was significantly increased with combined hormone therapy (HT) at an average follow-up of 5.6 years.     A 2019 meta-analysis of all available epidemiologic evidence on the association between menopausal hormone therapy (MHT) use and breast cancer risk has been published. The analysis included nearly 145,000 women with breast cancer (51 percent of whom had used MHT) and nearly 425,000 without breast cancer. Their findings included:  ?Similar to the WHI, estrogen-progestin regimens were associated with excess breast cancer risk. An excess risk was also seen with estrogen-only regimens (a reduction in risk was seen in the WHI). There was no excess risk with vaginal estrogens.   ?Breast cancer risk increased with the duration of systemic MHT use. Unlike previous studies, obesity was not associated with excess risk; instead, it attenuated risk.  ?The authors of the study calculated that for women of average weight, five years of MHT use starting at age 50 years would increase their  20-year risk of breast cancer (between the ages of 50 and 69 years) by approximately:  One in every 50 users of estrogen plus daily progestin  One in every 70 users of estrogen plus intermittent progestin   One in every 200 users of estrogen-only regimens   Of note: There were important limitations in this study.   While this meta-analysis has renewed concerns for some about the association between MHT and breast cancer, we continue to suggest an individualized approach when counseling symptomatic postmenopausal women about treatment. This includes putting the potential risk of breast cancer (and cardiovascular disease) in the context of the benefits of MHT (eg, relief of vasomotor symptoms, improved sleep and quality of life, and prevention of bone loss)  -Reproductive factors -Earlier menarche (before age 12) or later menopause (after age 52), Nulliparity, Increasing age at first full-term pregnancy.   (Of note, It is estimated that for every 12 months of breastfeeding, there was a 4.3 percent reduction in the relative risk (RR) of breast cancer)  -Personal and family history of breast cancer  -Alcohol use and smoking  -Exposure to therapeutic ionizing radiation           2. Risk stratifying models:  There are several models available for stratifying breast cancer risk, and Felisha-Edinsonck is presently the model utilized by [facility] and is a model recommended per current NCCN guidelines.      Educational videos:   What Is a TC Score?    https://youtu.be/Xnhn0NTEitM     What If I Have a High-Risk TC Score?     https://youtu.be/lGv9sNk2n5Q      The Dina Model for Breast Cancer risk estimates the absolute 5 year risk and lifetime risk of developing breast cancer. Family history includes only first degree relatives with breast cancer, which is not enough information to estimate the risk of a patient having BRCA mutation. It also underestimates the cancer risk for patients with extensive family history. The  Dina Model is a good predictor of risk for populations but not for individuals. It adjusts risk for race/ethnicity. It may underestimate breast cancer risk in patients with atypical hyperplasia and strong family history. The Dina Model was NOT designed to estimate risk for: Women with a prior diagnosis of breast cancer, lobular carcinoma in situ (LCIS), or ductal carcinoma in situ (DCIS);  Women who have received previous radiation therapy to the chest for treatment of Hodgkin lymphoma;  Women with gene mutations in BRCA1 or BRCA2, or those who are known to have certain genetic syndromes that increase risk for breast cancer; Women of age <35 or >85.    There are limitations to every model for risk assessment, particularly that TC can overestimate risk in women with atypical hyperplasia and dense breasts and that Dina underestimates risk for those with a strong family history of breast or ovarian cancers as well as non-white women with atypical hyperplasia which can make them appear to not be candidates for risk reducing therapies.         3. High risk patients:       INCREASED RISK SCREENING: per NCCN                      MRI breast:   The use of MRI for breast cancer detection is based on the concept of  tumor angiogenesis or neovascularity. Tumor-associated blood vessels have increased permeability, which leads to prompt uptake and release of gadolinium within the first one to two minutes after administration, leading to a pattern of rapid enhancement and washout on MRI.   Bilateral breast examination - Both breasts should be evaluated in an MRI study, for comparison purposes, even when concern about possible pathology involves only one breast.  Contrast - Intravenous gadolinium contrast must be used to maximize cancer detection and is administered before breast MRI to highlight the neovascularity associated with cancers. Contrast is not necessary when the study is performed to evaluate silicone implant  integrity.  Allergic and anaphylactoid reactions to gadolinium are rare, but can occur. In addition, in patients with renal failure, gadolinium can cause contrast nephropathy and/or nephrogenic systemic fibrosis.   A few studies have also reported gadolinium deposition in the brain from repeated intravenous administration, with the degree of deposition varying based on the specific contrast agent. The clinical significance of this deposition remains unknown, and no data for humans exist to show any adverse effects or harm at this time.     FDA Drug Safety Communication: FDA identifies no harmful effects to date with brain retention of gadolinium-based contrast agents for MRIs;   review to continue: https:// www.fda.gov/Drugs/DrugSafety/bxt543970.htm    -Contact insurance company with regards to coverage of MRI breasts.   -Cannot undergo an MRI if pregnant.   -MRI's may have false positives                 JOVANY (contrast enhanced mammogram):  JOVANY is the main alternative for anyone who benefits by but cannot have a breast MRI with IV contrast.    Main indications:   High risk screening   Suspicious clinical symptoms with inconclusive mammogram and ultrasound   New breast cancer, evaluate extent of disease   Pre and post lucho-adjuvant systemic therapy evaluation     For high-risk screening, JOVANY replaces the regular non-contrast mammogram and any other supplemental test         For age over 75 years, screening recommendations are considered on an individual basis.       ACR guidelines:    Note: New American College of Radiology® (ACR®) breast cancer screening guidelines  now call for all women -- particularly Black and Ashkenazi Adventism women -- to have risk assessment by age 25 to determine if screening earlier than age 40 is needed. The ACR continues to recommend annual screening starting at age 40 for women of average risk, but earlier and more intensive screening for high-risk patients. The new ACR guidelines  for  high-risk women were published online May 3 in the Journal of the American College of Radiology (JACR ).      Early detection decreases breast cancer death. The ACR recommends annual screening beginning at age 40 for women of average risk and earlier and/or more intensive screening for women at higher-than-average risk. For most women at higher-than-average risk, the supplemental screening method of choice is breast MRI. Women with genetics-based increased risk, those with a calculated lifetime risk of 20% or more, and those exposed to chest radiation at young ages are recommended to undergo MRI surveillance starting at ages 25 to 30 and annual mammography (with a variable starting age between 25 and 40, depending on the type of risk). Mutation carriers can delay mammographic screening until age 40 if annual screening breast MRI is performed as recommended. Women diagnosed with breast cancer before age 50 or with personal histories of breast cancer and dense breasts should undergo annual supplemental breast MRI. Others with personal histories, and those with atypia at biopsy, should strongly consider MRI screening, especially if other risk factors are present. For women with dense breasts who desire supplemental screening, breast MRI is recommended. For those who qualify for but cannot undergo breast MRI, contrast-enhanced mammography or ultrasound could be considered. All women should undergo risk assessment by age 25, especially Black women and women of Ashkenazi Quaker heritage, so that those at higher-than-average risk can be identified and appropriate screening initiated.      -RECOMMENDED LIFESTYLE MODIFICATIONS FOR HIGH RISK PATIENTS:    * Reviewed Lifestyle modifications which have shown benefit:  Limit alcohol consumption to less than 1 drink per day (1 ounce liquor, 6 oz wine, 8 oz beer) and nor more than 3 drinks per week.   Avoid smoking.  Exercise at least 150 minutes per week of moderate intensity  aerobic activity or at least 75 minutes of vigorous activity. Exercise can lower the relative risk of breast cancer by ~18-20%.  Maintain healthy weight and avoid post-menopausal weight gain. Avoid processed foods and eat more lean proteins, fruits and vegetables.                               * Available resources include genetic counseling, nutrition, weight management.    -requirements for Bariatric surgery. BMI must be >40 with no comorbidities or 35> with at least two comorbidities pertaining obesity (Htn, type 2 diabetes, Reed, Osteoarthritis, and `  hyperlipidemia)          -CHEMOPREVENTION:                * For women at high risk for breast cancer, endocrine therapy can reduce the risk of invasive and/or in situ breast cancers. (tamoxifen for premenopausal or postmenopausal women and raloxifene or exemestane for postmenopausal women).   -Above have been shown to lower the risk of breast cancer incidence, however there is no survival benefit in patients who don't have breast cancer.        Tamoxifen:    Data regarding tamoxifen risk reduction are limited to pre- and postmenopausal individuals >=35 years of age with a Dina Model 5-year breast cancer risk of >=1.7% or a 10-year risk by CHARITO/Felisha-Moriahzicke of >=5% or a history of LCIS.  Tamoxifen: 20 mg per day for 5 years was shown to reduce risk of breast cancer by 49%. Among individuals with a history of AH, this dose and duration of tamoxifen were associated with an 86% reduction in breast cancer risk. Low-dose tamoxifen (5 mg per day for 3-5 years)d is an option if patient is symptomatic on the 20-mg dose or if patient is unwilling or unable to take standard-dose tamoxifen.1 This low dosage needs further investigation in premenopausal individuals.  The efficacy of tamoxifen risk reduction in individuals who are carriers of BRCA1/2 and other pathogenic mutations is less well studied than in other risk groups. Limited data suggest there may be a benefit, likely  a larger benefit, for BRCA2 carriers.  For healthy, premenopausal individuals at elevated risk for breast cancer, data regarding the risk/benefit ratio for tamoxifen appear relatively favorable (category 1).  For postmenopausal individuals at elevated risk for breast cancer, data regarding the risk/benefit ratio for tamoxifen are influenced by age, presence of uterus, or comorbid conditions (category 1). There are insufficient data on ethnicity and rac  -Risks of Tamoxifen side effects include hot flashes, invasive endometrial cancer in women > 49 years of age (2.3/1000 compared to 0.9/1000), cataracts, increased risk of pulmonary embolism among others.    Raloxifene:    Data regarding raloxifene risk reduction are limited to postmenopausal individuals >=35 years of age with a Dina Model 5-year breast cancer risk >=1.7% or a 10-year risk by CHARITO/Felisha-Moriahzickfranny of >=5% or a history of LCIS.  Raloxifene: 60 mg per day was found to be equivalent to tamoxifen for breast cancer risk reduction in the initial comparison. While raloxifene in long-term follow-up appears to be less efficacious in risk reduction than tamoxifen, consideration of toxicity may still lead to the choice of raloxifene over tamoxifen in individuals with an intact uterus.  There are no data regarding the use of raloxifene in individuals who are carriers of BRCA1/2 and other pathogenic mutations or who have had prior thoracic radiation.  For postmenopausal individuals at elevated risk for breast cancer, data regarding the risk/benefit ratio for raloxifene are influenced by age or comorbid conditions (category 1). There are insufficient data on ethnicity and race.  Use of raloxifene for breast cancer risk reduction in premenopausal individuals is inappropriate unless part of a clinical trial.     Aromatase Inhibitors (exemestane and anastrozole)   Data regarding exemestane are from a single large randomized study limited to postmenopausal individuals  >=35 years of age with a Dina Model 5-year breast cancer risk >=1.7% or a 10-year risk by CHARITO/Tyrer-Cuzicke of >=5% or a history of LCIS.  Data regarding anastrozole are from a single large randomized study limited to postmenopausal individuals 40 to 70 years of age with the following risk compared with the general population: Aged 40 to 44 years - 4 times higher Aged 45 to 60 years - >=2 times higher Aged 60 to 70 years - >=1.5 times higher Individuals who did not meet these criteria but had a Tyrer-Cuzicke model 10-year breast cancer risk >5% were also included.  Exemestane: 25 mg per day was found to reduce the relative incidence of invasive breast cancer by 65% from 0.55% to 0.19% with a median follow-up of 3 years.  Anastrozole: 1 mg per day was found to reduce the relative incidence of breast cancer by 53% with a median follow-up of 5 years.  There are retrospective data that aromatase inhibitors can reduce the risk of contralateral breast cancer in BRCA1/2 patients with ER-positive breast cancer who take aromatase inhibitors as adjuvant agents.  For postmenopausal individuals at elevated risk for breast cancer, data regarding the risk/benefit ratio for aromatase inhibitor agents are influenced by age and comorbid conditions such as osteoporosis (category 1). There are insufficient data on ethnicity and race.  Use of aromatase inhibitors for breast cancer risk reduction in premenopausal individuals is inappropriate         Assessment:     1. At high risk for breast cancer    2. Extremely dense tissue of both breasts on mammography    3. Family history of breast cancer          Breast Cancer Risk Stratification   Current, Estimated Breast Cancer Risk Model Used Patient's Score Risk for general population Patient's Risk Category   5-year Dina Model -% -%  [x] N/A given age <35   [] Average risk (<1.7%)   [] Increased risk (>=1.7%)   10-year Tyrer-Cuzick v8.0b 0.97%   [x] <5%   [] >=5%    Lifetime (to age 85)  Cody v8.0b 18.18% 12.9%  [] Average risk (<15%)   [x] Intermediate risk (>=15% - <20%)   [] High risk (>=20%)   According to the American Cancer Society, patients with a lifetime breast cancer risk of 20% or higher might benefit from supplemental screening exams. Women with a 5-year risk greater than or equal to 1.7% may benefit from chemoprevention agents (tamoxifen, raloxifene, aromatase inhibitors) to reduce risk.        Patient's risk factors include but are not limited to:  Dense breast tissue  Family history of breast cancer    Plan:     Patient's TC score places her in the intermediate risk category for developing a breast cancer. Her Aunt was diagnosed with breast cancer in her late 50's. We discussed her risk status and guidelines above.   Opts for annual JOVANY starting at age 32yo. She knows to call if she has any breast concerns.   Encouraged breast awareness, including monthly breast self-exams.   Recommend lifestyle modifications as above.       RTC at age 32yo    Questions were encouraged and answered to patient's satisfaction, and patient verbalized understanding of information and agreement with the plan. Advised patient to RTC with any interval changes or concerns.      Route Chart for Scheduling    Med Onc Chart Routing      Follow up with physician    Follow up with [facility] No follow up needed.   Infusion scheduling note    Injection scheduling note    Labs    Imaging    Pharmacy appointment    Other referrals                           Patient is in agreement with the proposed treatment plan. All questions were answered to the patient's satisfaction. Pt knows to call clinic for any new or worsening symptoms and if anything is needed before the next clinic visit.    Chanel Aranda, MSN, APRN, FNP-C  Nurse Practitioner to Dr. Abena Berrios  Lead [facility] for [facility]  Lead [facility]  Hematology & Medical Oncology  [address] [place] [id number]  ph. [phone]  ext [phone]  Fax. [phone]

## 2025-01-28 ENCOUNTER — PATIENT MESSAGE (OUTPATIENT)
Dept: INTERNAL MEDICINE | Facility: CLINIC | Age: 30
End: 2025-01-28

## 2025-01-28 ENCOUNTER — LAB VISIT (OUTPATIENT)
Dept: LAB | Facility: OTHER | Age: 30
End: 2025-01-28
Attending: STUDENT IN AN ORGANIZED HEALTH CARE EDUCATION/TRAINING PROGRAM
Payer: COMMERCIAL

## 2025-01-28 ENCOUNTER — OFFICE VISIT (OUTPATIENT)
Dept: INTERNAL MEDICINE | Facility: CLINIC | Age: 30
End: 2025-01-28
Payer: COMMERCIAL

## 2025-01-28 VITALS
DIASTOLIC BLOOD PRESSURE: 68 MMHG | BODY MASS INDEX: 23.2 KG/M2 | HEART RATE: 81 BPM | SYSTOLIC BLOOD PRESSURE: 108 MMHG | OXYGEN SATURATION: 100 % | WEIGHT: 162.06 LBS | HEIGHT: 70 IN

## 2025-01-28 DIAGNOSIS — E78.00 PURE HYPERCHOLESTEROLEMIA: ICD-10-CM

## 2025-01-28 DIAGNOSIS — Z83.42 FAMILY HISTORY OF HIGH CHOLESTEROL: ICD-10-CM

## 2025-01-28 DIAGNOSIS — E10.3293 TYPE 1 DIABETES MELLITUS WITH MILD NONPROLIFERATIVE DIABETIC RETINOPATHY WITHOUT MACULAR EDEMA, BILATERAL: ICD-10-CM

## 2025-01-28 DIAGNOSIS — Z00.00 ANNUAL PHYSICAL EXAM: Primary | ICD-10-CM

## 2025-01-28 LAB
ALBUMIN/CREAT UR: 4.9 UG/MG (ref 0–30)
CREAT UR-MCNC: 244.5 MG/DL (ref 15–325)
MICROALBUMIN UR DL<=1MG/L-MCNC: 12 UG/ML

## 2025-01-28 PROCEDURE — 99999 PR PBB SHADOW E&M-EST. PATIENT-LVL IV: CPT | Mod: PBBFAC,,, | Performed by: STUDENT IN AN ORGANIZED HEALTH CARE EDUCATION/TRAINING PROGRAM

## 2025-01-28 PROCEDURE — 3074F SYST BP LT 130 MM HG: CPT | Mod: CPTII,S$GLB,, | Performed by: STUDENT IN AN ORGANIZED HEALTH CARE EDUCATION/TRAINING PROGRAM

## 2025-01-28 PROCEDURE — 3078F DIAST BP <80 MM HG: CPT | Mod: CPTII,S$GLB,, | Performed by: STUDENT IN AN ORGANIZED HEALTH CARE EDUCATION/TRAINING PROGRAM

## 2025-01-28 PROCEDURE — 82570 ASSAY OF URINE CREATININE: CPT | Performed by: STUDENT IN AN ORGANIZED HEALTH CARE EDUCATION/TRAINING PROGRAM

## 2025-01-28 PROCEDURE — 99395 PREV VISIT EST AGE 18-39: CPT | Mod: S$GLB,,, | Performed by: STUDENT IN AN ORGANIZED HEALTH CARE EDUCATION/TRAINING PROGRAM

## 2025-01-28 PROCEDURE — 1159F MED LIST DOCD IN RCRD: CPT | Mod: CPTII,S$GLB,, | Performed by: STUDENT IN AN ORGANIZED HEALTH CARE EDUCATION/TRAINING PROGRAM

## 2025-01-28 PROCEDURE — 3008F BODY MASS INDEX DOCD: CPT | Mod: CPTII,S$GLB,, | Performed by: STUDENT IN AN ORGANIZED HEALTH CARE EDUCATION/TRAINING PROGRAM

## 2025-01-28 NOTE — PROGRESS NOTES
"    [facility]    Subjective:       Patient ID: Susu Su is a 29 y.o. female.    Chief Complaint: Annual Exam        History was obtained from the patient and supplemented through chart review.  This patient is known to me.     She was seeing Dr. Bahena prior.    HPI:    Patient is a 29 y.o. female w/ T1DM who presents for annual    On spironolactone 100 doing well from derm, normal low BP    DMT1  Prior endo 2012 Dr. Sales, Dr. Valle 9/2023  Then has continued with Dr. Venus Perez from [facility] due to being in [place] for 10 years  Talks to Dr. Perez every 3 months, reviewed time in range. Also diabetic educator  Has CGM and insulin pump  Appt upcomining Monday with Dr. Valle    Fam hx of HLD  Saw cardiologist   Lipitor 20 w/ mirena   No AE      Exercise 6 times a week    Medical History  Past Medical History:   Diagnosis Date    Diabetes mellitus type I     Hyperlipidemia        Review of Systems   Constitutional:  Negative for fever.   HENT:  Negative for trouble swallowing.    Respiratory:  Negative for shortness of breath.    Cardiovascular:  Negative for chest pain.   Gastrointestinal:  Negative for constipation, diarrhea, nausea and vomiting.   Musculoskeletal:  Negative for gait problem.   Skin:         Hair follicle enlarged, irritated   Neurological:  Negative for dizziness and seizures.   Psychiatric/Behavioral:  Negative for hallucinations.          Surgical hx, family hx, social hx   Have been reviewed      Current Outpatient Medications:     atorvastatin (LIPITOR) 20 MG tablet, Take 1 tablet (20 mg total) by mouth every evening., Disp: 90 tablet, Rfl: 3    BAQSIMI 3 mg/actuation Spry, 3 MG NASAL AS DIRECTED IN ONE NOSTRIL DOSE DOES NOT NEED TO BE INHALED, Disp: 2 each, Rfl: 6    BD INSULIN SYRINGE ULT-FINE II 0.3 mL 31 x 5/16" Syrg, USE AS DIRECTED 5 TIMES A DAY FOR INSULIN ADMINISTRATION, Disp: 500 each, Rfl: 1    celecoxib (CELEBREX) 200 MG capsule, Take 1 capsule (200 mg total) " "by mouth once daily., Disp: 30 capsule, Rfl: 1    CONTOUR NEXT TEST STRIPS Strp, SMARTSIG:Via Meter 10 Times Daily PRN, Disp: , Rfl:     DEXCOM G7  Misc, , Disp: , Rfl:     DEXCOM G7 SENSOR Marti, CBG: CHANGE EVERY 10 DAYS, Disp: , Rfl:     glucagon, human recombinant, (GLUCAGON) 1 mg Kit, Inject 1 mL (1 mg total) into the skin as needed (for emergency hypoglycemic episode)., Disp: 3 kit, Rfl: 1    insulin aspart U-100 (NOVOLOG U-100 INSULIN ASPART) 100 unit/mL injection, ACCORDING TO YOUR SLIDING SCALE, Disp: 10 mL, Rfl: 4    insulin glargine, TOUJEO, (TOUJEO SOLOSTAR U-300 INSULIN) 300 unit/mL (1.5 mL) InPn pen, Inject 20u daily in the event of insulin pump failure, Disp: 3 mL, Rfl: 6    insulin lispro 100 unit/mL injection, Inject into the skin 3 (three) times daily before meals., Disp: , Rfl:     insulin syringe-needle U-100 1/2 mL 30 x 5/16" Syrg, For four to five times administration of insulin., Disp: 300 each, Rfl: 4    lancets (FREESTYLE LANCETS) Misc, 6 Sticks by Misc.(Non-Drug; Combo Route) route once daily., Disp: 700 each, Rfl: 3    levonorgestreL (MIRENA) 20 mcg/24 hours (7 yrs) 52 mg IUD, 1 each by Intrauterine route once., Disp: , Rfl:     OMNIPOD DASH PODS, GEN 4, Crtg, Inject into the skin., Disp: , Rfl:     ondansetron (ZOFRAN-ODT) 8 MG TbDL, Take 8 mg by mouth every 8 (eight) hours as needed., Disp: , Rfl:     pen needle, diabetic (BD ULTRA-FINE HUSSAIN PEN NEEDLE) 32 gauge x 5/32" Ndle, 1 each by Misc.(Non-Drug; Combo Route) route once daily., Disp: 50 each, Rfl: 6    SOOLANTRA 1 % Crea, SMARTSIG:sparingly Topical Daily, Disp: , Rfl:     spironolactone (ALDACTONE) 100 MG tablet, Take 100 mg by mouth., Disp: , Rfl:     fluticasone propionate (FLONASE) 50 mcg/actuation nasal spray, 2 sprays (100 mcg total) by Each Nostril route daily as needed for Rhinitis (nasal congestion). (Patient not taking: Reported on 10/7/2024), Disp: 15.8 mL, Rfl: 0    insulin glargine,hum.rec.anlog (LANTUS U-100 " "INSULIN SUBQ), Inject into the skin. (Patient not taking: Reported on 10/7/2024), Disp: , Rfl:     olopatadine (PATANOL) 0.1 % ophthalmic solution, Place 1 drop into both eyes 2 (two) times daily as needed for Allergies. (Patient not taking: Reported on 1/28/2025), Disp: 5 mL, Rfl: 0    ondansetron (ZOFRAN) 4 MG tablet, Take 1 tablet (4 mg total) by mouth every 6 (six) hours as needed for Nausea. (Patient not taking: Reported on 10/7/2024), Disp: 12 tablet, Rfl: 0    Objective:        Body mass index is 23.25 kg/m².  Vitals:    01/28/25 0813   BP: 108/68   Pulse: 81   SpO2: 100%   Weight: 73.5 kg (162 lb 0.6 oz)   Height: 5' 10" (1.778 m)   PainSc: 0-No pain     Physical Exam  Vitals and nursing note reviewed.   Constitutional:       General: She is not in acute distress.     Appearance: Normal appearance. She is not ill-appearing.   HENT:      Head: Normocephalic and atraumatic.   Eyes:      General: No scleral icterus.  Cardiovascular:      Rate and Rhythm: Normal rate and regular rhythm.      Heart sounds: Normal heart sounds.   Pulmonary:      Effort: Pulmonary effort is normal.   Abdominal:      General: There is no distension.   Genitourinary:     Comments: Right axilla pinpoint folliculitis, no surrounding tenderness  Musculoskeletal:         General: No deformity.      Cervical back: Normal range of motion.   Feet:      Right foot:      Protective Sensation: 6 sites tested.  6 sites sensed.      Skin integrity: Skin integrity normal.      Toenail Condition: Right toenails are normal.      Left foot:      Protective Sensation: 6 sites tested.  6 sites sensed.      Skin integrity: Skin integrity normal.      Toenail Condition: Left toenails are normal.   Skin:     General: Skin is warm and dry.   Neurological:      Mental Status: She is alert and oriented to person, place, and time.   Psychiatric:         Behavior: Behavior normal.           Lab Results   Component Value Date    WBC 9.37 12/01/2023    HGB 13.6 " 12/01/2023    HCT 39.4 12/01/2023     12/01/2023    CHOL 170 10/23/2024    TRIG 73 10/23/2024    HDL 59 10/23/2024    ALT 11 12/01/2023    AST 18 12/01/2023     (L) 12/01/2023    K 3.9 12/01/2023     12/01/2023    CREATININE 0.8 12/01/2023    BUN 14 12/01/2023    CO2 20 (L) 12/01/2023    TSH 2.258 10/28/2022    HGBA1C 6.3 (H) 09/26/2024       The ASCVD Risk score (Justin DK, et al., 2019) failed to calculate for the following reasons:    The 2019 ASCVD risk score is only valid for ages 40 to 79    (Imaging have been independently reviewed)    Mammo 10/2024    Assessment:         1. Annual physical exam    2. 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 Family history of high cholesterol    4. Pure hypercholesterolemia            Plan:     Susu was seen today for annual exam.    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, bilateral  -     Comprehensive Metabolic Panel; Future  -     Hemoglobin A1C; Future  -     Microalbumin/Creatinine Ratio, Urine; Future  -     Foot Exam Performed  -     Hemoglobin A1C; Future    Family history of high cholesterol    Pure hypercholesterolemia  -     Lipid Panel; Future          Health Maintenance  - Lipids:   - A1C:   - Colon Ca Screen: great grandparent past their 40's  - Immunizations: covid vaccinated and boosted once, needs bivalent and flu    Women's health  - Pap: [place] NILM 8/29/2022  - Mammo: na (family history of at age 60)  - Dexa: na  - Contraception: mirena IUD placed 2017, Dr. Mcghee    DM  - Eye exam: Michael upcoming again, mild retinopathy,  in 2 weeks approx  - Foot exam:  1/28/2024  - Statin if DM:  statin   - Microalbum/creatine:   - Ace-I if diabetic and no contraindications: na      Follow up in about 1 year (around 1/28/2026). or sooner prn      Visit today is associated with current or anticipated ongoing medical care related to this patient's single serious condition/complex condition of dm, fam hx of heart disease. The patient will return to see me as these issues will be followed longitudinally.     All medications were reviewed including potential side effects and risks/benefits.  Pt was counseled to call back if anything worsens or if questions arise.        Kamran Zhang MD  Family Medicine  [facility]  [address] [address]  Phone [phone]  Fax [phone]

## 2025-01-29 DIAGNOSIS — E87.5 HYPERKALEMIA: Primary | ICD-10-CM

## 2025-02-12 LAB
LEFT EYE DM RETINOPATHY: POSITIVE
RIGHT EYE DM RETINOPATHY: POSITIVE

## 2025-02-13 ENCOUNTER — PATIENT MESSAGE (OUTPATIENT)
Dept: INTERNAL MEDICINE | Facility: CLINIC | Age: 30
End: 2025-02-13
Payer: COMMERCIAL

## 2025-02-13 ENCOUNTER — LAB VISIT (OUTPATIENT)
Dept: LAB | Facility: OTHER | Age: 30
End: 2025-02-13
Attending: STUDENT IN AN ORGANIZED HEALTH CARE EDUCATION/TRAINING PROGRAM
Payer: COMMERCIAL

## 2025-02-13 DIAGNOSIS — E87.5 HYPERKALEMIA: ICD-10-CM

## 2025-02-13 LAB — POTASSIUM SERPL-SCNC: 4.7 MMOL/L (ref 3.5–5.1)

## 2025-02-13 PROCEDURE — 84132 ASSAY OF SERUM POTASSIUM: CPT | Performed by: STUDENT IN AN ORGANIZED HEALTH CARE EDUCATION/TRAINING PROGRAM

## 2025-02-13 PROCEDURE — 36415 COLL VENOUS BLD VENIPUNCTURE: CPT | Performed by: STUDENT IN AN ORGANIZED HEALTH CARE EDUCATION/TRAINING PROGRAM

## 2025-02-20 ENCOUNTER — PATIENT MESSAGE (OUTPATIENT)
Dept: SPORTS MEDICINE | Facility: CLINIC | Age: 30
End: 2025-02-20
Payer: COMMERCIAL

## 2025-03-18 ENCOUNTER — TELEPHONE (OUTPATIENT)
Dept: PHARMACY | Facility: CLINIC | Age: 30
End: 2025-03-18
Payer: COMMERCIAL

## 2025-03-18 NOTE — TELEPHONE ENCOUNTER
[facility]/Population Health Chart Review & Patient Outreach Details For Medication Adherence Project    Reason for Outreach Encounter: 3rd Party payor non-compliance report ([employer], [employer], C, etc)  2.  Patient Outreach Method: Reviewed patient chart   3.   Medication in question:    Hyperlipidemia Medications              atorvastatin (LIPITOR) 20 MG tablet Take 1 tablet (20 mg total) by mouth every evening.                  atorvastatin  last filled  2/23/25 for 90 day supply      4.  Reviewed and or Updates Made To: Patient Chart  5. Outreach Outcomes and/or actions taken: Patient filled medication and is on track to be adherent  Additional Notes:

## 2025-04-01 ENCOUNTER — PATIENT OUTREACH (OUTPATIENT)
Dept: ADMINISTRATIVE | Facility: HOSPITAL | Age: 30
End: 2025-04-01
Payer: COMMERCIAL

## 2025-04-02 ENCOUNTER — PATIENT MESSAGE (OUTPATIENT)
Dept: OBSTETRICS AND GYNECOLOGY | Facility: CLINIC | Age: 30
End: 2025-04-02
Payer: COMMERCIAL

## 2025-04-21 ENCOUNTER — PATIENT MESSAGE (OUTPATIENT)
Dept: OBSTETRICS AND GYNECOLOGY | Facility: CLINIC | Age: 30
End: 2025-04-21
Payer: COMMERCIAL

## 2025-04-30 ENCOUNTER — PATIENT OUTREACH (OUTPATIENT)
Dept: ADMINISTRATIVE | Facility: HOSPITAL | Age: 30
End: 2025-04-30
Payer: COMMERCIAL

## 2025-05-05 ENCOUNTER — TELEPHONE (OUTPATIENT)
Dept: OBSTETRICS AND GYNECOLOGY | Facility: CLINIC | Age: 30
End: 2025-05-05
Payer: COMMERCIAL

## 2025-05-05 NOTE — TELEPHONE ENCOUNTER
----- Message from Susan sent at 5/5/2025  8:44 AM CDT -----  Regarding: appt request  Name of Who is Calling: Susu What is the request in detail: Patient is requesting a call back to get IUD removed only.  Can the clinic reply by [facility]: Yes What [facility] [phone]

## 2025-05-07 ENCOUNTER — TELEPHONE (OUTPATIENT)
Dept: OBSTETRICS AND GYNECOLOGY | Facility: CLINIC | Age: 30
End: 2025-05-07
Payer: COMMERCIAL

## 2025-05-07 NOTE — TELEPHONE ENCOUNTER
----- Message from Susan sent at 5/7/2025  1:19 PM CDT -----  Regarding: appt request  Name of Who is Calling: Susu What is the request in detail: Pt is requesting a call back to schedule to have her IUD removed in July.  Can the clinic reply by [facility]: Yes What [facility]:  [phone]

## 2025-05-22 ENCOUNTER — PATIENT MESSAGE (OUTPATIENT)
Dept: INTERNAL MEDICINE | Facility: CLINIC | Age: 30
End: 2025-05-22
Payer: COMMERCIAL

## 2025-07-07 ENCOUNTER — PATIENT MESSAGE (OUTPATIENT)
Dept: OBSTETRICS AND GYNECOLOGY | Facility: CLINIC | Age: 30
End: 2025-07-07
Payer: COMMERCIAL

## 2025-07-08 DIAGNOSIS — Z30.433 ENCOUNTER FOR IUD REMOVAL AND REINSERTION: Primary | ICD-10-CM

## 2025-07-08 RX ORDER — IBUPROFEN 800 MG/1
800 TABLET, FILM COATED ORAL EVERY 6 HOURS PRN
Qty: 12 TABLET | Refills: 0 | Status: SHIPPED | OUTPATIENT
Start: 2025-07-08

## 2025-07-08 RX ORDER — ALPRAZOLAM 0.25 MG/1
0.25 TABLET ORAL ONCE
Qty: 1 TABLET | Refills: 0 | Status: SHIPPED | OUTPATIENT
Start: 2025-07-08 | End: 2025-07-08

## 2025-07-08 RX ORDER — MISOPROSTOL 200 UG/1
TABLET ORAL
Qty: 2 TABLET | Refills: 0 | Status: SHIPPED | OUTPATIENT
Start: 2025-07-08

## 2025-07-22 ENCOUNTER — PATIENT MESSAGE (OUTPATIENT)
Dept: OBSTETRICS AND GYNECOLOGY | Facility: CLINIC | Age: 30
End: 2025-07-22
Payer: COMMERCIAL

## 2025-07-23 ENCOUNTER — TELEPHONE (OUTPATIENT)
Dept: OBSTETRICS AND GYNECOLOGY | Facility: CLINIC | Age: 30
End: 2025-07-23
Payer: COMMERCIAL

## 2025-07-23 DIAGNOSIS — Z30.432 ENCOUNTER FOR IUD REMOVAL: Primary | ICD-10-CM

## 2025-07-24 ENCOUNTER — PROCEDURE VISIT (OUTPATIENT)
Dept: OBSTETRICS AND GYNECOLOGY | Facility: CLINIC | Age: 30
End: 2025-07-24
Attending: OBSTETRICS & GYNECOLOGY
Payer: COMMERCIAL

## 2025-07-24 ENCOUNTER — PROCEDURE VISIT (OUTPATIENT)
Dept: OBSTETRICS AND GYNECOLOGY | Facility: CLINIC | Age: 30
End: 2025-07-24
Payer: COMMERCIAL

## 2025-07-24 VITALS
HEIGHT: 70 IN | HEART RATE: 60 BPM | SYSTOLIC BLOOD PRESSURE: 114 MMHG | BODY MASS INDEX: 22.48 KG/M2 | DIASTOLIC BLOOD PRESSURE: 69 MMHG | WEIGHT: 157 LBS

## 2025-07-24 DIAGNOSIS — Z30.432 ENCOUNTER FOR IUD REMOVAL: ICD-10-CM

## 2025-07-24 DIAGNOSIS — Z30.432 ENCOUNTER FOR IUD REMOVAL: Primary | ICD-10-CM

## 2025-07-24 RX ORDER — SPIRONOLACTONE 50 MG/1
TABLET, FILM COATED ORAL
COMMUNITY
Start: 2025-05-23

## 2025-07-24 RX ORDER — HYDROCORTISONE 25 MG/G
CREAM TOPICAL
COMMUNITY
Start: 2025-07-08

## 2025-07-24 RX ORDER — KETOROLAC TROMETHAMINE 30 MG/ML
30 INJECTION, SOLUTION INTRAMUSCULAR; INTRAVENOUS
Status: COMPLETED | OUTPATIENT
Start: 2025-07-24 | End: 2025-07-24

## 2025-07-24 RX ADMIN — KETOROLAC TROMETHAMINE 30 MG: 30 INJECTION, SOLUTION INTRAMUSCULAR; INTRAVENOUS at 01:07

## 2025-07-24 NOTE — PROCEDURES
Removal of IUD    Date/Time: 7/24/2025 2:00 PM    Performed by: Alona Mcghee MD  Authorized by: Alona Mcghee MD    Consent obtained:  Prior to procedure the appropriate consent was completed and verified  Consent given by:  Patient  Procedure risks and benefits discussed: yes    Patient questions answered: yes    Patient agrees, verbalizes understanding, and wants to proceed: yes    Educational handouts given: yes    Instructions and paperwork completed: yes    Implant grasped by: forceps  Removal due to infection and inflammatory reaction: no    Removal due to mechanical complications: yes    Removed with no complications: no     IUD strings were not visualized.  Plan was to do a possible hysteroscopy.  10 cc lidocaine 1% was used to place a cervical block.  Graspers were used to insert through the cervix and strings were grasped without difficulty.  IUD was removed through the cervix without difficulty.  No hysteroscopy had to be done.  Patient had bleeding which resolved with pressure.    Female chaperone present for procedureno

## 2025-08-22 DIAGNOSIS — E78.00 PURE HYPERCHOLESTEROLEMIA: ICD-10-CM

## 2025-08-22 RX ORDER — ATORVASTATIN CALCIUM 20 MG/1
20 TABLET, FILM COATED ORAL NIGHTLY
Qty: 90 TABLET | Refills: 1 | Status: SHIPPED | OUTPATIENT
Start: 2025-08-22